# Patient Record
Sex: FEMALE | Race: WHITE | NOT HISPANIC OR LATINO
[De-identification: names, ages, dates, MRNs, and addresses within clinical notes are randomized per-mention and may not be internally consistent; named-entity substitution may affect disease eponyms.]

---

## 2017-02-13 ENCOUNTER — TRANSCRIPTION ENCOUNTER (OUTPATIENT)
Age: 72
End: 2017-02-13

## 2017-02-13 ENCOUNTER — INPATIENT (INPATIENT)
Facility: HOSPITAL | Age: 72
LOS: 8 days | Discharge: HOME | End: 2017-02-22
Attending: PSYCHIATRY & NEUROLOGY | Admitting: PSYCHIATRY & NEUROLOGY
Payer: MEDICARE

## 2017-02-13 VITALS
HEART RATE: 107 BPM | RESPIRATION RATE: 16 BRPM | SYSTOLIC BLOOD PRESSURE: 114 MMHG | WEIGHT: 128.97 LBS | TEMPERATURE: 98 F | HEIGHT: 61 IN | DIASTOLIC BLOOD PRESSURE: 78 MMHG

## 2017-02-13 DIAGNOSIS — R69 ILLNESS, UNSPECIFIED: ICD-10-CM

## 2017-02-13 DIAGNOSIS — F33.2 MAJOR DEPRESSIVE DISORDER, RECURRENT SEVERE WITHOUT PSYCHOTIC FEATURES: ICD-10-CM

## 2017-02-13 LAB
ALBUMIN SERPL ELPH-MCNC: 3.9 G/DL — SIGNIFICANT CHANGE UP (ref 3.3–5)
ALP SERPL-CCNC: 105 U/L — SIGNIFICANT CHANGE UP (ref 40–120)
ALT FLD-CCNC: 28 U/L — SIGNIFICANT CHANGE UP (ref 12–78)
AMPHET UR-MCNC: NEGATIVE — SIGNIFICANT CHANGE UP
ANION GAP SERPL CALC-SCNC: 9 MMOL/L — SIGNIFICANT CHANGE UP (ref 5–17)
APAP SERPL-MCNC: <2 UG/ML — LOW (ref 10–30)
APPEARANCE UR: CLEAR — SIGNIFICANT CHANGE UP
AST SERPL-CCNC: 36 U/L — SIGNIFICANT CHANGE UP (ref 15–37)
BACTERIA # UR AUTO: (no result)
BARBITURATES UR SCN-MCNC: NEGATIVE — SIGNIFICANT CHANGE UP
BASOPHILS # BLD AUTO: 0.1 K/UL — SIGNIFICANT CHANGE UP (ref 0–0.2)
BASOPHILS NFR BLD AUTO: 0.4 % — SIGNIFICANT CHANGE UP (ref 0–2)
BENZODIAZ UR-MCNC: NEGATIVE — SIGNIFICANT CHANGE UP
BILIRUB SERPL-MCNC: 0.9 MG/DL — SIGNIFICANT CHANGE UP (ref 0.2–1.2)
BILIRUB UR-MCNC: (no result)
BUN SERPL-MCNC: 17 MG/DL — SIGNIFICANT CHANGE UP (ref 7–23)
CALCIUM SERPL-MCNC: 9.8 MG/DL — SIGNIFICANT CHANGE UP (ref 8.5–10.1)
CHLORIDE SERPL-SCNC: 105 MMOL/L — SIGNIFICANT CHANGE UP (ref 96–108)
CO2 SERPL-SCNC: 27 MMOL/L — SIGNIFICANT CHANGE UP (ref 22–31)
COCAINE METAB.OTHER UR-MCNC: NEGATIVE — SIGNIFICANT CHANGE UP
COD CRY URNS QL: (no result)
COLOR SPEC: YELLOW — SIGNIFICANT CHANGE UP
CREAT SERPL-MCNC: 1.14 MG/DL — SIGNIFICANT CHANGE UP (ref 0.5–1.3)
DIFF PNL FLD: (no result)
EOSINOPHIL # BLD AUTO: 0 K/UL — SIGNIFICANT CHANGE UP (ref 0–0.5)
EOSINOPHIL NFR BLD AUTO: 0.2 % — SIGNIFICANT CHANGE UP (ref 0–6)
EPI CELLS # UR: SIGNIFICANT CHANGE UP
ETHANOL SERPL-MCNC: <10 MG/DL — SIGNIFICANT CHANGE UP (ref 0–10)
GLUCOSE SERPL-MCNC: 105 MG/DL — HIGH (ref 70–99)
GLUCOSE UR QL: NEGATIVE MG/DL — SIGNIFICANT CHANGE UP
HCT VFR BLD CALC: 35.7 % — SIGNIFICANT CHANGE UP (ref 34.5–45)
HGB BLD-MCNC: 12.7 G/DL — SIGNIFICANT CHANGE UP (ref 11.5–15.5)
KETONES UR-MCNC: (no result)
LEUKOCYTE ESTERASE UR-ACNC: (no result)
LYMPHOCYTES # BLD AUTO: 19.5 % — SIGNIFICANT CHANGE UP (ref 13–44)
LYMPHOCYTES # BLD AUTO: 2.4 K/UL — SIGNIFICANT CHANGE UP (ref 1–3.3)
MCHC RBC-ENTMCNC: 33.4 PG — SIGNIFICANT CHANGE UP (ref 27–34)
MCHC RBC-ENTMCNC: 35.5 GM/DL — SIGNIFICANT CHANGE UP (ref 32–36)
MCV RBC AUTO: 93.9 FL — SIGNIFICANT CHANGE UP (ref 80–100)
METHADONE UR-MCNC: NEGATIVE — SIGNIFICANT CHANGE UP
MONOCYTES # BLD AUTO: 0.7 K/UL — SIGNIFICANT CHANGE UP (ref 0–0.9)
MONOCYTES NFR BLD AUTO: 5.2 % — SIGNIFICANT CHANGE UP (ref 2–14)
NEUTROPHILS # BLD AUTO: 9.3 K/UL — HIGH (ref 1.8–7.4)
NEUTROPHILS NFR BLD AUTO: 74.6 % — SIGNIFICANT CHANGE UP (ref 43–77)
NITRITE UR-MCNC: NEGATIVE — SIGNIFICANT CHANGE UP
OPIATES UR-MCNC: NEGATIVE — SIGNIFICANT CHANGE UP
PCP SPEC-MCNC: SIGNIFICANT CHANGE UP
PCP UR-MCNC: NEGATIVE — SIGNIFICANT CHANGE UP
PH UR: 5 — SIGNIFICANT CHANGE UP (ref 4.8–8)
PLATELET # BLD AUTO: 433 K/UL — HIGH (ref 150–400)
POTASSIUM SERPL-MCNC: 4.2 MMOL/L — SIGNIFICANT CHANGE UP (ref 3.5–5.3)
POTASSIUM SERPL-SCNC: 4.2 MMOL/L — SIGNIFICANT CHANGE UP (ref 3.5–5.3)
PROT SERPL-MCNC: 7.5 GM/DL — SIGNIFICANT CHANGE UP (ref 6–8.3)
PROT UR-MCNC: 30 MG/DL
RBC # BLD: 3.8 M/UL — SIGNIFICANT CHANGE UP (ref 3.8–5.2)
RBC # FLD: 11.5 % — SIGNIFICANT CHANGE UP (ref 10.3–14.5)
RBC CASTS # UR COMP ASSIST: (no result) /HPF (ref 0–4)
SALICYLATES SERPL-MCNC: <1.7 MG/DL — LOW (ref 2.8–20)
SODIUM SERPL-SCNC: 141 MMOL/L — SIGNIFICANT CHANGE UP (ref 135–145)
SP GR SPEC: 1.03 — HIGH (ref 1.01–1.02)
THC UR QL: NEGATIVE — SIGNIFICANT CHANGE UP
UROBILINOGEN FLD QL: 1 MG/DL
WBC # BLD: 12.4 K/UL — HIGH (ref 3.8–10.5)
WBC # FLD AUTO: 12.4 K/UL — HIGH (ref 3.8–10.5)
WBC UR QL: (no result)

## 2017-02-13 PROCEDURE — 93010 ELECTROCARDIOGRAM REPORT: CPT

## 2017-02-13 PROCEDURE — 99285 EMERGENCY DEPT VISIT HI MDM: CPT

## 2017-02-13 RX ORDER — TETANUS AND DIPHTHERIA TOXOIDS ADSORBED 2; 2 [LF]/.5ML; [LF]/.5ML
0.5 INJECTION INTRAMUSCULAR ONCE
Qty: 0 | Refills: 0 | Status: COMPLETED | OUTPATIENT
Start: 2017-02-13 | End: 2017-02-13

## 2017-02-13 RX ORDER — VILAZODONE HYDROCHLORIDE 20 MG/1
40 TABLET, FILM COATED ORAL DAILY
Qty: 0 | Refills: 0 | Status: DISCONTINUED | OUTPATIENT
Start: 2017-02-13 | End: 2017-02-14

## 2017-02-13 RX ADMIN — TETANUS AND DIPHTHERIA TOXOIDS ADSORBED 0.5 MILLILITER(S): 2; 2 INJECTION INTRAMUSCULAR at 20:54

## 2017-02-13 NOTE — ED BEHAVIORAL HEALTH ASSESSMENT NOTE - SUMMARY
72 y.o. DWF with intractable depression who made an impulsive , serious suicide attempt 3d ago , first cutting L wrist and then deeper on R wrist"but it wasn't deep enough". Sought no care until son called yesterday "and I told him what I did".   No longer sees therapist Lizbeth Santos"because she doesn't do CBT."    Had unsuccessful TMS treatments prior to Viibyd, neither which have had efficacy with depression. Pt. reports TMS "helped anxiety".   Pt. presents danger to self at this time    recommendation:   9.39 admission to [facility]

## 2017-02-13 NOTE — ED BEHAVIORAL HEALTH ASSESSMENT NOTE - DETAILS
1 , no grandchildren pt took OD and attempted to CO poison self in past son states "all in her family" son is an alcoholic in recovery he reports consulted Dr. rasmussen Dr. Patel/Marcial

## 2017-02-13 NOTE — ED ADULT NURSE REASSESSMENT NOTE - NS ED NURSE REASSESS COMMENT FT1
Pt calm, cooperative, no complaints of pain or discomfort. 1:1 maintained for safety. Pt speaking to son on the phone. Pt pending results for admission to the hospital. Will continue to monitor for safety and comfort.

## 2017-02-13 NOTE — ED BEHAVIORAL HEALTH ASSESSMENT NOTE - DESCRIPTION
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

## 2017-02-13 NOTE — ED BEHAVIORAL HEALTH ASSESSMENT NOTE - PRIMARY DX
Severe episode of recurrent major depressive disorder, without psychotic features Deferred condition on axis II

## 2017-02-13 NOTE — ED BEHAVIORAL HEALTH ASSESSMENT NOTE - OTHER
Dr. Torres Pitch [phone] urgent care intractable depression, states questioned if sons and brother would be there for her during this depression

## 2017-02-13 NOTE — ED PROVIDER NOTE - NS ED MD SCRIBE ATTENDING SCRIBE SECTIONS
HISTORY OF PRESENT ILLNESS/PAST MEDICAL/SURGICAL/SOCIAL HISTORY/REVIEW OF SYSTEMS/PHYSICAL EXAM/PROGRESS NOTE/RESULTS/DISPOSITION/VITAL SIGNS( Pullset)

## 2017-02-13 NOTE — ED BEHAVIORAL HEALTH ASSESSMENT NOTE - RISK ASSESSMENT
completed: due to reported spontaneity of attempt, desire to have dog taken care of and no identified precipitant , pt presents risk to self with depressed mood

## 2017-02-13 NOTE — ED BEHAVIORAL HEALTH ASSESSMENT NOTE - HPI (INCLUDE ILLNESS QUALITY, SEVERITY, DURATION, TIMING, CONTEXT, MODIFYING FACTORS, ASSOCIATED SIGNS AND SYMPTOMS)
72 y.o. DWF with hx of chronic depression who made a suicide attempt on 2/10/16 by cutting wrists. Today went to urgent care and told them her story and 911 was called. Pt. has 2 adult sons who call her weekly or [id number]. yesterday one son called and she told them what she had done. he came over, cleaned the wounds and told her to get follow up. Pt. went to urgent care today.   2nd son met her in ED and informed writer mother had attempted suicide ~ 1 year ago by CO poisoning and was admitted to [facility]. he fears for her to live alone and wants her to stay with one son upon D/C.   Pt. denies specific precipitant to attempt. States that she didn't "cut deep enough, so I cleaned it up".   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 States has no friends or social meetings. Pt. has a dog . States planned to leave message for son to  the dog "before I passed out".   Reports has "lived with depression my whole life". States had course of ECT in 20's and have been tried "on every medication and combination".   Pt. states "I'm fine now, I want to go home".   No evidence of psychosis and denies A/V/O/T hallucinations. Denies paranoia, prefers to be alone.

## 2017-02-13 NOTE — ED BEHAVIORAL HEALTH ASSESSMENT NOTE - AXIS IV
Problems with primary support/Problems with access to healthcare services/Problem related to social environment

## 2017-02-13 NOTE — ED PROVIDER NOTE - OBJECTIVE STATEMENT
73 y/o female with PMHx of depression presents to the ED s/p suicide attempt on Saturday. Son states pt was more depressed than normal and cut her right wrist with a razor on Saturday while she was home alone. Pt went to Urgent Care seeking sutures and police were called because they suspected a suicide  Denies SI/HI.

## 2017-02-14 DIAGNOSIS — F60.3 BORDERLINE PERSONALITY DISORDER: ICD-10-CM

## 2017-02-14 DIAGNOSIS — F41.1 GENERALIZED ANXIETY DISORDER: ICD-10-CM

## 2017-02-14 RX ORDER — LURASIDONE HYDROCHLORIDE 40 MG/1
20 TABLET ORAL AT BEDTIME
Qty: 0 | Refills: 0 | Status: DISCONTINUED | OUTPATIENT
Start: 2017-02-14 | End: 2017-02-21

## 2017-02-14 RX ORDER — VILAZODONE HYDROCHLORIDE 20 MG/1
20 TABLET, FILM COATED ORAL DAILY
Qty: 0 | Refills: 0 | Status: DISCONTINUED | OUTPATIENT
Start: 2017-02-14 | End: 2017-02-21

## 2017-02-14 RX ADMIN — LURASIDONE HYDROCHLORIDE 20 MILLIGRAM(S): 40 TABLET ORAL at 21:18

## 2017-02-14 NOTE — BEHAVIORAL HEALTH ASSESSMENT NOTE - DETAILS
pt took OD and attempted to CO poison self in past son states "all in her family" pt's mother with h/o severe depression son is an alcoholic in recovery he reports

## 2017-02-14 NOTE — BEHAVIORAL HEALTH ASSESSMENT NOTE - AXIS III
allergies to codeine, prednisone , PCN and geodon  Significant laceratuion R wrist allergies to codeine, prednisone , PCN and geodon  Significant  self inflicted laceration to bilateral wrists  R >  L

## 2017-02-14 NOTE — BEHAVIORAL HEALTH ASSESSMENT NOTE - NSBHSUICRISKFACTOR_PSY_A_CORE
Anhedonia/Agitation/severe anxiety/Impulsivity/Perceived burden on family and others/Hopelessness/Mood episode

## 2017-02-14 NOTE — BEHAVIORAL HEALTH ASSESSMENT NOTE - NSBHREFERDETAILS_PSY_A_CORE_FT
Pt seen in Urgent Care for evaluation of pt SIB bilateral wrist cutting with a razor, a suicide attempt pt had made on 2/10/17  but one she had not planned to reveal to anyone until her son happened to phone the pt on 2/13/17.

## 2017-02-14 NOTE — BEHAVIORAL HEALTH ASSESSMENT NOTE - SUMMARY
72 y.o. DWF with intractable depression who made an impulsive , serious suicide attempt 3d ago , first cutting L wrist and then deeper on R wrist"but it wasn't deep enough". Sought no care until son called yesterday "and I told him what I did".   No longer sees therapist Lizbeth Santos"because she doesn't do CBT."    Had unsuccessful TMS treatments prior to Viibyd, neither which have had efficacy with depression. Pt. reports TMS "helped anxiety".   Pt. presents danger to self at this time    recommendation:   9.39 admission to [facility]

## 2017-02-14 NOTE — CONSULT NOTE ADULT - ASSESSMENT
SEVERE DEPRESSION WITH SUICIDE ATTEMPT AND SLASHING WRISTS    ABNORMAL URINALYSIS BUT PATIENT IS ASYMPTOMATIC      RECOMMENDATIONS:  -  medically stable for admission to   -  primary management per psychiatry  -  will need to have the sutures removed in 7-10 days, can follow up at the Urgent Care where she had them placed  -  will sign off SEVERE DEPRESSION WITH SUICIDE ATTEMPT AND BILATERAL WRIST LACERATIONS    ABNORMAL URINALYSIS, BUT PATIENT IS ASYMPTOMATIC AT THIS TIME      RECOMMENDATIONS:  -  medically stable for admission to   -  primary management per psychiatry  -  will consult plastic surgery - Dr. Callahan to assess if there is a role to suture the lacerations  -  pain control  -  will sign off    d/w patient, Dr. Callahan, psych RN

## 2017-02-14 NOTE — BEHAVIORAL HEALTH ASSESSMENT NOTE - PROBLEM SELECTOR PLAN 3
1 Medication regimen as above  2.DBT  CBT to address pt emotional dysregulation and low frustration tolerance.

## 2017-02-14 NOTE — BEHAVIORAL HEALTH ASSESSMENT NOTE - HPI (INCLUDE ILLNESS QUALITY, SEVERITY, DURATION, TIMING, CONTEXT, MODIFYING FACTORS, ASSOCIATED SIGNS AND SYMPTOMS)
72 y.o. DWF with hx of chronic depression and comorbid CHASE and social anxiety disorder, living alone at her home in [place]. The pt,  who had made a suicide attempt on 2/10/16 by cutting wrists went to a local Urgent Care for evaluation of her SIB wounds after which  urgent care staff, upon hearing the circumstances of the pt's injuries, had called.  911 and the pt was transported to  ED for acute psychiatric evaluation and emergency admission for further treatment of her severe depression and anxiety. Pt. has 2 adult sons who call her q 1-2 weekly  When her older son Kris , age 45  and living in [place], called  the pt on 2/13/17 and she told him  what she had done. he came over, cleaned the wounds and told her to get follow up. Pt. went to urgent care on 2/13/17 after which the pt was transported to  ED for evaluation as above.                     At the  ED, the pt's 41 yr-old son Sajan reported that the pt had made one earlier suicide attempt attempted approximately a year ago via  CO poisoning and  the pt had been admitted to [facility]. at that time for treatment of her severe, refractory depression.                     When seen in the ED on 2/13/17 and again on 5N on 2/14/17 by this writer, the pt attempted to downplay the nature and severity of her depression and her recent suicide attempt.  The pt had denied a  specific precipitant to this  attempt. and stated that after having cut her wrists, ," I saw that I didn't  cut deep enough, so I cleaned it up. It was a mess. ".                    Interestingly, the pt noted that, " I started to feel better right away when I saw how quickly my sons and my brother in [place] came to support me. I keep thinking they won't want to support me anymore with my depression."                    The pt endorsed a neurovegetative pattern of depressive symptoms including decreased energy, anhedonia and variably  poor sleep and appetite with chronic suicidality and multiple largely unsuccessful treatments including most recently, a course of TMS x the past 1-2 months with Dr Brian Prado in [place].. Pt reported a brief period of clinical improvement after TMS, with subsequent relapse  ' like before. 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  States has no friends or social meetings. Pt. has a dog Martita  In the ED, pt had reported that after cutting her wrists, she had " planned to leave message for  my son to  the dog  before I passed out".              Reports has "lived with depression my whole life". States had course of ECT in 20's and have been tried "on every medication and combination". with minimal , always fleeting clinical efficacy.  The pt denied a h/o substance use, psychosis, HI or legal issues. The pt was admitted to   on a 9.39 emergency legal status on 2/13/17 and placed on suicide alert status for her safety. The pt was seen by the hospitalist on 2/14/17 and a Plastic Surgery consult was requested as to pt SIB to bilateral wrists on 2/10/17.        We briefly reviewed pt's current outpt med treatment regimen of Viibryd 40 mg po q am x the past 6 weeks. Pt again noted minimal clinical efficacy with this. Pt suggested a possible retrial of Latuda which the pt believed  'may have worked well for me in the past, but of course , that didn't last long either.'

## 2017-02-14 NOTE — BEHAVIORAL HEALTH ASSESSMENT NOTE - NSBHADMITIPSTRENGTH_PSY_A_CORE
Has vocational interests or hobbies/Has access to housing/residential stability/Knowledge of medications/Cooperative with treatment/Intelligent/Creative/Has supportive interpersonal relationships with family, friends or peers/In good physical health

## 2017-02-14 NOTE — CONSULT NOTE ADULT - SUBJECTIVE AND OBJECTIVE BOX
PCP -     CHIEF COMPLAINT:   I' cute my wrists"    HISTORY OF THE PRESENT ILLNESS:  72 F with     PAST MEDICAL HISTORY:  Depression    PAST SURGICAL HISTORY:    FAMILY HISTORY:   non-contributory to the patient's current presentation    SOCIAL HISTORY:  no smoking, no alcohol, no drugs    REVIEW OF SYSTEMS:   All 10 systems reviewed in detailed and found to be negative with the exception of what has already been described above    MEDICATIONS  (STANDING):  vilaZODone 40milliGRAM(s) Oral daily    MEDICATIONS  (PRN):  LORazepam     Tablet 0.5milliGRAM(s) Oral every 6 hours PRN severe anxiety/agiation      VITALS SIGNS:  T(F): 97.9, Max: 98.1 (02-13 @ 12:49)  HR: 94 (65 - 107)  BP: 121/68 (114/78 - 133/73)  RR: 16 (16 - 16)  SpO2: 100% (95% - 100%)  Wt(kg): --      PHYSICAL EXAM:    HEENT:  pupils equal and reactive, EOMI, no oropharyngeal lesions, erythema, exudates, oral thrush    NECK:   supple, no carotid bruits, no palpable lymph nodes, no thyromegaly    CV:  +S1, +S2, regular, no murmurs or rubs    RESP:   lungs clear to auscultation bilaterally, no wheezing, rales, rhonchi, good air entry bilaterally    BREAST:  not examined    GI:  abdomen soft, non-tender, non-distended, normal BS, no bruits, no abdominal masses, no palpable masses    RECTAL:  not examined    :  not examined    MSK:   normal muscle tone, no atrophy, no rigidity, no contractions    EXT:   no clubbing, no cyanosis, no edema, no calf pain, swelling or erythema    VASCULAR:  pulses equal and symmetric in the upper and lower extremities    NEURO:  AAOX3, no focal neurological deficits, follows all commands, able to move extremities spontaneously    SKIN:  no ulcers, lesions or rashes        LABS:                        12.7   12.4  )-----------( 433      ( 2017 16:13 )             35.7     2017 16:13    141    |  105    |  17     ----------------------------<  105    4.2     |  27     |  1.14     Ca    9.8        2017 16:13    TPro  7.5    /  Alb  3.9    /  TBili  0.9    /  DBili  x      /  AST  36     /  ALT  28     /  AlkPhos  105    2017 16:13    LIVER FUNCTIONS - ( 2017 16:13 )  Alb: 3.9 g/dL / Pro: 7.5 gm/dL / ALK PHOS: 105 U/L / ALT: 28 U/L / AST: 36 U/L / GGT: x           Urinalysis Basic - ( 2017 16:59 )    Color: Yellow / Appearance: Clear / S.030 / pH: x  Gluc: x / Ketone: Trace  / Bili: Small / Urobili: 1 mg/dL   Blood: x / Protein: 30 mg/dL / Nitrite: Negative   Leuk Esterase: Moderate / RBC: 3-5 /HPF / WBC 6-10   Sq Epi: x / Non Sq Epi: Occasional / Bacteria: Few    Blood, Urine: Small ( @ 16:59)      EKG - NSR, no acute ischemic changes, QTc 431 PCP - Lula Mckeon    CHIEF COMPLAINT:   "I cut my wrists"    HISTORY OF THE PRESENT ILLNESS:  72 F with Hx of Depression and Suicide in the past, presented to the Urgent care center after cutting her wrists on Friday in an attempt to commit suicide.  The police were called and she was taken to HH ER for evaluation.  It was felt by the ED physicians that the lacerations were not possible to be closed.  She was seen by the psych team and admitted to the inpatient unit.  Patient is asking for the lacerations to be sutured for if they can be evaluated by plastic surgery.    PAST MEDICAL HISTORY:  Depression  Suicide Attempt in the past    PAST SURGICAL HISTORY:  s/p lumpectomy  s/p hysterectomy    FAMILY HISTORY:   non-contributory to the patient's current presentation    SOCIAL HISTORY:  no smoking, no alcohol, no drugs, 2 sons, lives alone, can do own ADLs,     REVIEW OF SYSTEMS:   All 10 systems reviewed in detailed and found to be negative with the exception of what has already been described above    MEDICATIONS  (STANDING):  vilaZODone 40milliGRAM(s) Oral daily    MEDICATIONS  (PRN):  LORazepam     Tablet 0.5milliGRAM(s) Oral every 6 hours PRN severe anxiety/agiation      VITALS SIGNS:  T(F): 97.9, Max: 98.1 (02-13 @ 12:49)  HR: 94 (65 - 107)  BP: 121/68 (114/78 - 133/73)  RR: 16 (16 - 16)  SpO2: 100% (95% - 100%)  Wt(kg): --      PHYSICAL EXAM:    HEENT:  pupils equal and reactive, EOMI, no oropharyngeal lesions, erythema, exudates, oral thrush    NECK:   supple, no carotid bruits, no palpable lymph nodes, no thyromegaly    CV:  +S1, +S2, regular, no murmurs or rubs    RESP:   lungs clear to auscultation bilaterally, no wheezing, rales, rhonchi, good air entry bilaterally    BREAST:  not examined    GI:  abdomen soft, non-tender, non-distended, normal BS, no bruits, no abdominal masses, no palpable masses    RECTAL:  not examined    :  not examined    MSK:   normal muscle tone, no atrophy, no rigidity, no contractions    EXT:   no clubbing, no cyanosis, no edema, no calf pain, swelling or erythema, there are 2 large transverse lacerations on each wrist about 6cm in length.  There is no exposed bone or tendons and the wounds appearing to be healing with secondary intent.  There is mild tenderness to palpation, no drainage, no bleeding, no surrounding erythema, FROM of the wrists, fingers.  Sensation intact,  no neurological hand deficits, + pulses, hands warm and perfused    VASCULAR:  pulses equal and symmetric in the upper and lower extremities    NEURO:  AAOX3, no focal neurological deficits, follows all commands, able to move extremities spontaneously    SKIN:  as above in EXT section        LABS:                        12.7   12.4  )-----------( 433      ( 2017 16:13 )             35.7     2017 16:13    141    |  105    |  17     ----------------------------<  105    4.2     |  27     |  1.14     Ca    9.8        2017 16:13    TPro  7.5    /  Alb  3.9    /  TBili  0.9    /  DBili  x      /  AST  36     /  ALT  28     /  AlkPhos  105    2017 16:13    LIVER FUNCTIONS - ( 2017 16:13 )  Alb: 3.9 g/dL / Pro: 7.5 gm/dL / ALK PHOS: 105 U/L / ALT: 28 U/L / AST: 36 U/L / GGT: x           Urinalysis Basic - ( 2017 16:59 )    Color: Yellow / Appearance: Clear / S.030 / pH: x  Gluc: x / Ketone: Trace  / Bili: Small / Urobili: 1 mg/dL   Blood: x / Protein: 30 mg/dL / Nitrite: Negative   Leuk Esterase: Moderate / RBC: 3-5 /HPF / WBC 6-10   Sq Epi: x / Non Sq Epi: Occasional / Bacteria: Few    Blood, Urine: Small ( @ 16:59)      EKG - NSR, no acute ischemic changes, QTc 431

## 2017-02-14 NOTE — BEHAVIORAL HEALTH ASSESSMENT NOTE - PAST PSYCHOTROPIC MEDICATION
trials of multiple antidepressants, antipsychotics, mood stabilizers, including Latuda with some reported brief clinical efficacy

## 2017-02-14 NOTE — BEHAVIORAL HEALTH ASSESSMENT NOTE - NSBHCHARTREVIEWLAB_PSY_A_CORE FT
CBC Full  -  ( 2017 16:13 )  WBC Count : 12.4 K/uL  Hemoglobin : 12.7 g/dL  Hematocrit : 35.7 %  Platelet Count - Automated : 433 K/uL  Mean Cell Volume : 93.9 fl  Mean Cell Hemoglobin : 33.4 pg  Mean Cell Hemoglobin Concentration : 35.5 gm/dL  Auto Neutrophil # : 9.3 K/uL  Auto Lymphocyte # : 2.4 K/uL  Auto Monocyte # : 0.7 K/uL  Auto Eosinophil # : 0.0 K/uL  Auto Basophil # : 0.1 K/uL  Auto Neutrophil % : 74.6 %  Auto Lymphocyte % : 19.5 %  Auto Monocyte % : 5.2 %  Auto Eosinophil % : 0.2 %  Auto Basophil % : 0.4 %    2017 16:13    141    |  105    |  17     ----------------------------<  105    4.2     |  27     |  1.14     Ca    9.8        2017 16:13    TPro  7.5    /  Alb  3.9    /  TBili  0.9    /  DBili  x      /  AST  36     /  ALT  28     /  AlkPhos  105    2017 16:13      Urinalysis Basic - ( 2017 16:59 )    Color: Yellow / Appearance: Clear / S.030 / pH: x  Gluc: x / Ketone: Trace  / Bili: Small / Urobili: 1 mg/dL   Blood: x / Protein: 30 mg/dL / Nitrite: Negative   Leuk Esterase: Moderate / RBC: 3-5 /HPF / WBC 6-10   Sq Epi: x / Non Sq Epi: Occasional / Bacteria: Few

## 2017-02-14 NOTE — BEHAVIORAL HEALTH ASSESSMENT NOTE - PROBLEM SELECTOR PLAN 1
1.To begin rapid taper and DC of current largely ineffective antidepressant Vilazodone from 40 mg  to 20 mg po q am   2.To restart reportedly at least somewhat effective Latuda 20 mg po qhs with titration as tolerated and as clinically indicated for treatment of severe depression and affective dysregulation  3.Pt encouraged to attend therapy groups   4.Pt gave verbal consent to have staff contact pt's family and outpt providers  5.CBT  DBT treatments recommended. To begin pt work on  healthier coping skills

## 2017-02-14 NOTE — BEHAVIORAL HEALTH ASSESSMENT NOTE - NSBHCHARTREVIEWVS_PSY_A_CORE FT
Vital Signs Last 24 Hrs  T(C): 36.6, Max: 36.6 (02-13 @ 20:30)  T(F): 97.9, Max: 97.9 (02-14 @ 08:27)  HR: 94 (94 - 94)  BP: 121/68 (121/68 - 121/68)  BP(mean): --  RR: 16 (16 - 16)  SpO2: 100% (95% - 100%)

## 2017-02-15 RX ORDER — LANOLIN ALCOHOL/MO/W.PET/CERES
3 CREAM (GRAM) TOPICAL AT BEDTIME
Qty: 0 | Refills: 0 | Status: DISCONTINUED | OUTPATIENT
Start: 2017-02-15 | End: 2017-02-22

## 2017-02-15 RX ORDER — IBUPROFEN 200 MG
600 TABLET ORAL
Qty: 0 | Refills: 0 | Status: DISCONTINUED | OUTPATIENT
Start: 2017-02-15 | End: 2017-02-22

## 2017-02-15 RX ADMIN — Medication 600 MILLIGRAM(S): at 19:36

## 2017-02-15 RX ADMIN — Medication 0.5 MILLIGRAM(S): at 09:03

## 2017-02-15 RX ADMIN — VILAZODONE HYDROCHLORIDE 20 MILLIGRAM(S): 20 TABLET, FILM COATED ORAL at 12:06

## 2017-02-15 RX ADMIN — LURASIDONE HYDROCHLORIDE 20 MILLIGRAM(S): 40 TABLET ORAL at 21:35

## 2017-02-15 RX ADMIN — Medication 600 MILLIGRAM(S): at 21:35

## 2017-02-15 RX ADMIN — Medication 1 TABLET(S): at 21:35

## 2017-02-15 NOTE — PROGRESS NOTE BEHAVIORAL HEALTH - AXIS III
allergies to codeine, prednisone , PCN and geodon  Significant  self inflicted laceration to bilateral wrists  R >  L

## 2017-02-15 NOTE — CONSULT NOTE ADULT - SUBJECTIVE AND OBJECTIVE BOX
72F s/p suicide attempt last Friday who presented to ED with bilateral wrist lacerations.  The lacerations were not addressed in the ED at that time and so I was called to evaluate.  Pt endorses bilateral pain on her volar wrists.  Denies impairment in sensibility or ROM.     PMH: depression, previous suicide attempt  PSH: lumpectomy, hysterectomy    Allx: PCN    Soc: Nonsmoker    EXAM:   R wrist: volar laceration prox to distal wrist crease, approx 6cm in length x 0.5cm wide, full thickness through dermis to subcutaneous tissue without exposure of vital structures.  No distal paresthesias.  Full ROM in all digits, wrist ROM slightly limited 2/2 pain.   L wrist: 6cm laceration full thickness just prox to distal wrist crease. slightly more shallow than R wrist. No vital structures appreciated.       Procedure (dictated):  bilateral complex closure of wrist lacerations.   Sutured with buried monocryl, steri strips and guaze applied.    Imp:  72F s/p suicide attempt with bilateral wrist lacerations.  Performed delayed primary closure at the bedside.   --would rec 1 week of abx   --ok to shower tomorrow  --f/u as outpatient for wound check (approx 1 week. if she is still an inpatient will see her again on the floor).  --please call with any questions/concerns.     [phone]

## 2017-02-16 LAB
AMPHETAMINES BLD QL SCN: NEGATIVE — SIGNIFICANT CHANGE UP
BARBITURATES SERPLBLD QL: NEGATIVE — SIGNIFICANT CHANGE UP
BENZODIAZAPINES, SERUM: NEGATIVE — SIGNIFICANT CHANGE UP
CANNABINOIDS SERPLBLD QL SCN: NEGATIVE — SIGNIFICANT CHANGE UP
COCAINE+BZE SERPLBLD QL SCN: NEGATIVE — SIGNIFICANT CHANGE UP
METHADONE SERPL-MCNC: NEGATIVE — SIGNIFICANT CHANGE UP
OPIATES SERPL QL: NEGATIVE — SIGNIFICANT CHANGE UP
PCP BLD QL SCN: NEGATIVE — SIGNIFICANT CHANGE UP

## 2017-02-16 RX ADMIN — LURASIDONE HYDROCHLORIDE 20 MILLIGRAM(S): 40 TABLET ORAL at 21:20

## 2017-02-16 RX ADMIN — VILAZODONE HYDROCHLORIDE 20 MILLIGRAM(S): 20 TABLET, FILM COATED ORAL at 10:49

## 2017-02-16 RX ADMIN — Medication 1 TABLET(S): at 21:20

## 2017-02-16 RX ADMIN — Medication 1 TABLET(S): at 09:13

## 2017-02-17 RX ADMIN — Medication 1 TABLET(S): at 09:21

## 2017-02-17 RX ADMIN — Medication 1 TABLET(S): at 22:01

## 2017-02-17 RX ADMIN — LURASIDONE HYDROCHLORIDE 20 MILLIGRAM(S): 40 TABLET ORAL at 22:01

## 2017-02-18 RX ADMIN — Medication 1 TABLET(S): at 09:23

## 2017-02-18 RX ADMIN — LURASIDONE HYDROCHLORIDE 20 MILLIGRAM(S): 40 TABLET ORAL at 21:31

## 2017-02-18 RX ADMIN — Medication 1 TABLET(S): at 21:31

## 2017-02-18 RX ADMIN — VILAZODONE HYDROCHLORIDE 20 MILLIGRAM(S): 20 TABLET, FILM COATED ORAL at 09:25

## 2017-02-19 RX ADMIN — LURASIDONE HYDROCHLORIDE 20 MILLIGRAM(S): 40 TABLET ORAL at 20:49

## 2017-02-19 RX ADMIN — VILAZODONE HYDROCHLORIDE 20 MILLIGRAM(S): 20 TABLET, FILM COATED ORAL at 11:28

## 2017-02-19 RX ADMIN — Medication 1 TABLET(S): at 20:49

## 2017-02-19 RX ADMIN — Medication 1 TABLET(S): at 11:29

## 2017-02-20 RX ADMIN — Medication 1 TABLET(S): at 21:08

## 2017-02-20 RX ADMIN — LURASIDONE HYDROCHLORIDE 20 MILLIGRAM(S): 40 TABLET ORAL at 21:08

## 2017-02-20 RX ADMIN — VILAZODONE HYDROCHLORIDE 20 MILLIGRAM(S): 20 TABLET, FILM COATED ORAL at 10:52

## 2017-02-20 RX ADMIN — Medication 1 TABLET(S): at 09:04

## 2017-02-20 NOTE — PROGRESS NOTE BEHAVIORAL HEALTH - NSBHFUPSUICINTERVALFT_PSY_A_CORE
pt currently denies SI but shows limited appreciation for the seriousness of her recent suicide attempt   Pt reported marked mood improvement after suicide gesture prior to admission " once I saw my family support me."
pt with current passive SI. Pt reported marked mood improvement after suicide gesture prior to admission " once I saw my family support me."
pt with current passive SI. Pt reported marked mood improvement after suicide gesture prior to admission " once I saw my family support me."

## 2017-02-21 RX ORDER — LURASIDONE HYDROCHLORIDE 40 MG/1
40 TABLET ORAL AT BEDTIME
Qty: 0 | Refills: 0 | Status: DISCONTINUED | OUTPATIENT
Start: 2017-02-21 | End: 2017-02-22

## 2017-02-21 RX ORDER — LANOLIN ALCOHOL/MO/W.PET/CERES
1 CREAM (GRAM) TOPICAL
Qty: 14 | Refills: 1 | OUTPATIENT
Start: 2017-02-21 | End: 2017-03-20

## 2017-02-21 RX ORDER — LURASIDONE HYDROCHLORIDE 40 MG/1
1 TABLET ORAL
Qty: 14 | Refills: 1 | OUTPATIENT
Start: 2017-02-21 | End: 2017-03-20

## 2017-02-21 RX ADMIN — Medication 1 TABLET(S): at 09:09

## 2017-02-21 RX ADMIN — LURASIDONE HYDROCHLORIDE 40 MILLIGRAM(S): 40 TABLET ORAL at 21:05

## 2017-02-21 RX ADMIN — Medication 1 TABLET(S): at 21:05

## 2017-02-21 NOTE — DISCHARGE NOTE BEHAVIORAL HEALTH - NSBHDCCRISISPLAN1FT_PSY_A_CORE
Call 911, go to your nearest emergency room, call the crisis hotline number provided, call you psychiatrist

## 2017-02-21 NOTE — PROGRESS NOTE BEHAVIORAL HEALTH - NSBHADMITIPDSM_PSY_A_CORE
see above for Axis I, II, III

## 2017-02-21 NOTE — PROGRESS NOTE BEHAVIORAL HEALTH - PROBLEM SELECTOR PLAN 1
1.To begin rapid taper and DC of current largely ineffective antidepressant Vilazodone from 40 mg  to 20 mg po q am   2.To restart reportedly at least somewhat effective Latuda 20 mg po qhs with titration as tolerated and as clinically indicated for treatment of severe depression and affective dysregulation  3.Pt encouraged to attend therapy groups   4.Pt gave verbal consent to have staff contact pt's family and outpt providers  5.CBT  DBT treatments recommended. To begin pt work on  healthier  coping skills  6.Melatonin 3 mg po qhs ( insomnia)
1.Continue taper and plan for  DC of current largely ineffective antidepressant Vilazodone from 40 mg  to 20 mg po q am   2.To restart reportedly at least somewhat effective Latuda 20 mg po qhs with titration as tolerated and as clinically indicated for treatment of severe depression and affective dysregulation  3.Pt encouraged to attend therapy groups   4.Pt gave verbal consent to have staff contact pt's family and outpt providers  5.CBT  DBT treatments recommended. To begin pt work on  healthier  coping skills  6.Melatonin 3 mg po qhs ( insomnia)  7. Disposition planning ongoing.
1. DC reportedly ( per pt)  ineffective antidepressant Vilazodone now tapered to  20 mg po q am   2.To increase recently restarted reportedly at least somewhat effective Latuda dose to  40 mg po qhs with titration as tolerated and as clinically indicated for treatment of severe depression and affective dysregulation  3.Pt encouraged to attend therapy groups   4.Pt gave verbal consent to have staff contact pt's family  and Dr Prado ( outpt  psychiatrist )  5.CBT  DBT treatments recommended. To begin pt work on  healthier  coping skills  6.Melatonin 3 mg po qhs ( insomnia)  7. Disposition planning ongoing.
1.Continue taper and plan for  DC of current largely ineffective antidepressant Vilazodone from 40 mg  to 20 mg po q am   2.To restart reportedly at least somewhat effective Latuda 20 mg po qhs with titration as tolerated and as clinically indicated for treatment of severe depression and affective dysregulation  3.Pt encouraged to attend therapy groups   4.Pt gave verbal consent to have staff contact pt's family and outpt providers  5.CBT  DBT treatments recommended. To begin pt work on  healthier  coping skills  6.Melatonin 3 mg po qhs ( insomnia)  7. Disposition planning ongoing.

## 2017-02-21 NOTE — DISCHARGE NOTE BEHAVIORAL HEALTH - NSBHDCSUICFCTROTHERFT_PSY_A_CORE
ongoing, longstanding severe personality disorder difficulties involving emotional dysregulation, misperceived fears of abandonment  with subsequent pt dangerous impulsivity  .

## 2017-02-21 NOTE — PROGRESS NOTE BEHAVIORAL HEALTH - NSBHADMITIPREASON_PSY_A_CORE
Danger to self; mental illness expected to respond to inpatient care

## 2017-02-21 NOTE — DISCHARGE NOTE BEHAVIORAL HEALTH - CARE PROVIDER_API CALL
antonia dominguez Dr -pt's outpatient psychiatrist  in [place]  Tel [phone]  Appt scheduled for 2/23/17  at 9:30 am  Phone: (   )    -  Fax: (   )    -

## 2017-02-21 NOTE — PROGRESS NOTE BEHAVIORAL HEALTH - NSBHFUPINTERVALHXFT_PSY_A_CORE
Pt seen. Pt is tolerating med changes including DC of reportedly ineffective Vilazodone and restart of previously 'effective' Latuda with plan to titrate dose to 40 mg po qhs  for treatment of long standing emotional dysregulation and affective instability ..   Lengthy discussion with the pt as to the need for her to begin her CBT ( cognitive behavioral therapy) work right now, here in hospital and that with or without medications, a key focus  for this pt needed to be on addressing the pt's ongoing, entrenched cognitive distortions  and misperceptions related to 'her 'abandonment issues' with the family who have steadfastly supported the pt over her lifetime of 'depression'. and affective dysregulation.  On 2/15/17, this  writer had spoken with  the  pt's son Kris . Writer had met with the pt's older son after he had visited again with the pt during visiting hr. We discussed pt h/o depression and affective dysregulation with intermittent dangerous impulsivity always in the context of the pt's misperceived fears of 'being abandoned'. Though the pt's son remained stalwart and tried to minimize his own distress, he noted that ' We have all been through this with her for most of her life.  " When writer suggested that family members could sometimes use supportive brief counseling as well, the pt spoke of his ongoing sobriety and of his use of AA " for all my needs. " Pt 's son  reported that he has an AA  sponsor  and that he also sponsors others ..    Pt and earlier pt's son spoke of plans to have the pt begin outpt treatment with a therapist who specializes in CBT  and DBT, the latter at this writer's suggestion in light of the pt's apparent and longstanding entrenched personality picture consistent with borderline and dependent  personality disorders . Pt also amenable to continuing low dose trial of Melatonin  3 mg po qhs for chronic insomnia.
Pt seen. Pt is tolerating med changes including taper and DC of ineffective Vilazodone and restart of previously 'effective' Latuda.   Lengthy discussion with the pt as to the need for her to begin her CBT ( cognitive behavioral therapy) work right now, here in hospital and that with or without medications, a key focus  for this pt needed to be on addressing the pt's ongoing, entrenched cognitive distortions  and misperceptions related to 'her 'abandonment issues' with the family who have steadfastly supported the pt over her lifetime of 'depression'. and affective dysregulation.  On 2/15/17, this  writer had spoken with  the  pt's son Kris . Writer had met with the pt's older son after he had visited again with the pt during visiting hr. We discussed pt h/o depression and affective dysregulation with intermittent dangerous impulsivity always in the context of the pt's misperceived fears of 'being abandoned'. Though the pt's son remained stalwart and tried to minimize his own distress, he noted that ' We have all been through this with her for most of her life.  " When writer suggested that family members could sometimes use supportive brief counseling as well, the pt spoke of his ongoing sobriety and of his use of AA " for all my needs. " Pt 's son  reported that he has an AA  sponsor  and that he also sponsors others ..    Pt and earlier pt's son spoke of plans to have the pt begin outpt treatment with a therapist who specializes in CBT  and DBT, the latter at this writer's suggestion in light of the pt's apparent and longstanding entrenched personality picture consistent with borderline and dependent  personality disorders.   Writer again  reviewed with the pt the plan to taper and DC reportedly ineffective Vilazodone while restarting reportedly initially effective Latuda  for treatment of the pt's affective dysregulation. Pt also amenable to a trial of Melatonin for chronic insomnia.
Pt seen. Pt in better spirits once she proved to herself that her always loving and supportive family continues to love and support the pt. Pt's brother who lives in [place] had called the pt 24 hrs ago  and her older son Kris had visited twice with the pt in the past 24 hrs.   Writer had pt permission to speak with pt's son Kris . Writer met with the pt's older son after he had visited again with the pt during visiting hr. We discussed pt h/o depression and affective dysregulation with intermittent dangerous impulsivity always in the context of the pt's misperceived fears of 'being abandoned'. Though the pt's son remained stalwart and tried to minimize his own distress, he noted that ' We have all been through this with her for most of her life.  "   Pt and earlier pt's son spoke of plans to have the pt begin outpt treatment with a therapist who specializes in CBT  and DBT, the latter at this writer's suggestion in light of the pt's apparent and longstanding entrenched personality picture consistent with borderline and dependent  personality disorders.   Writer had reviewed with the pt the plan to taper and DC reportedly ineffective Vilazodone while restarting reportedly initially effective Latuda  for treatment of the pt's affective dysregulation. Pt also amenable to a trial of Melatonin for chronic insomnia.
Maintaining previous gains  Participating in unit activities  Tolerating crossover to Latuda

## 2017-02-21 NOTE — DISCHARGE NOTE BEHAVIORAL HEALTH - NSBHDCSUICFCTRMIT_PSY_A_CORE
pt is intelligent, creative and able to abstract . 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 Pt attends a local gym and cares  greatly about her dog .

## 2017-02-21 NOTE — PROGRESS NOTE BEHAVIORAL HEALTH - NSBHFUPINTERVALCCFT_PSY_A_CORE
" I 'm feeling better now. I want to go home."  Pt 'sealed over' with limited insight into the reasons behind her recent self destructive actions.  Writer reviewed with the pt the pt's long h/o self described misperceived  feelings of 'abandonment' by her family despite their having consistently rallied around the pt for over 40 yrs.     Writer spoke with pt's older son Chris again to review above as well as to aid in disposition planning.   Pt is tolerating taper  / crossover from Vilazodone to Latuda , the latter reportedly having been most effective in treating pt's emotional dysregulation. Pt herself concedes that ' no medication really has ever helped me for more than a few months. Then they just seem to stop working."   Writer encouraged the pt to continue her CBT / mindfulness 'homework' assignments as per activity therapists, in an effort for the pt to lay the foundation of her CBT therapy while inpatient, with ongoing pt CBT outpatient  treatment ( individual and group possibly) in order to solidify the pt's treatment gains. Pt amenable to this plan.   Pt seen by Plastic Surgery for follow up 2/17/17 s/p pt bilateral SIB cuts to both anterior wrists. Dr Roche  ( [phone]) to see the pt for outpatient follow up after pt DC from hospital when pt is clinically stable.
" I 'm feeling better. I feel like I'm in a better place now in my mind. I don't want to ever do anything to hurt myself again  or to hurt my sons again. I know they love me."  Pt 'sealed over' with limited insight into the reasons behind her recent self destructive actions.  Writer reviewed with the pt the pt's long h/o self described misperceived  feelings of 'abandonment' by her family despite their having consistently rallied around the pt for over 40 yrs.     Writer spoke with pt's older son Chris again to review above as well as to aid in disposition planning.   Pt is tolerating taper  / crossover from Vilazodone to Latuda , the latter reportedly having been most effective in treating pt's emotional dysregulation. Pt herself concedes that ' no medication really has ever helped me for more than a few months. Then they just seem to stop working."   Writer encouraged the pt to continue her CBT / mindfulness 'homework' assignments as per activity therapists, in an effort for the pt to lay the foundation of her CBT therapy while inpatient, with ongoing pt CBT outpatient  treatment ( individual and group possibly) in order to solidify the pt's treatment gains. Pt amenable to this plan.   Pt seen by Plastic Surgery for follow up 2/17/17 s/p pt bilateral SIB cuts to both anterior wrists. Dr Roche  ( [phone]) to see the pt for outpatient follow up after pt DC from hospital when pt is clinically stable.
" I was feeling upset this morning because I hadn't heard from my younger son . I called him and he said he hopes I'm doing ok , but it was all too much for him  after what happened, and he couldn't handle it.'
No complaints offered    Reading book in her room
Reports that she is feeling better.  Hoping to be leaving this coming week.
no complaints offered  Hoping she will be able to leave later this week.
' Did you talk to me son yet? Did you reach my doctor? I heard from Chris but not from Sajan. And I haven't heard from my brother yet.'

## 2017-02-21 NOTE — DISCHARGE NOTE BEHAVIORAL HEALTH - NSBHDCRESPONSEFT_PSY_A_CORE
Pt appeared to benefit from the safety and structure of the inpatient unit. She quickly became euthymic once she discerned that her consistently loving and supportive family had indeed remained loving and supportive.   The pt was able to make use of the group and individual meetings in order to help  the pt challenge her entrenched cognitive distortions regarding the pt's feelings og emptiness and abandonment.

## 2017-02-21 NOTE — PROGRESS NOTE BEHAVIORAL HEALTH - NSBHADMITIPBHPROVFT_PSY_A_CORE
Dr Torres Pitch

## 2017-02-21 NOTE — PROGRESS NOTE BEHAVIORAL HEALTH - NSBHADMITIPOBS_PSY_A_CORE
Enhanced supervision

## 2017-02-21 NOTE — DISCHARGE NOTE BEHAVIORAL HEALTH - NSBHDCMEDSFT_PSY_A_CORE
Vilazodone trial begun 6 weeks ago in outpatient  setting after TMS treatment with Dr Prado. Pt reported that no medication helped for very long over the years. Pt was amenable to restart Latuda , a mood stabilizer which the pt reported as having perhaps a more successful clinical effect then the other meds, " though nothing helped for more than a month or two."

## 2017-02-21 NOTE — PROGRESS NOTE BEHAVIORAL HEALTH - NSBHCHARTREVIEWVS_PSY_A_CORE FT
Vital Signs Last 24 Hrs  T(C): 36.6, Max: 36.6 (02-13 @ 20:30)  T(F): 97.9, Max: 97.9 (02-14 @ 08:27)  HR: 94 (94 - 94)  BP: 121/68 (121/68 - 121/68)  BP(mean): --  RR: 16 (16 - 16)  SpO2: 100% (95% - 100%)
Vital Signs Last 24 Hrs  T(C): 36.2, Max: 36.2 (02-19 @ 09:43)  T(F): 97.1, Max: 97.1 (02-19 @ 09:43)  HR: 86 (86 - 86)  BP: 117/80 (117/80 - 117/80)  BP(mean): --  RR: 14 (14 - 14)  SpO2: 98% (98% - 98%)
Vital Signs Last 24 Hrs  T(C): 36.6, Max: 36.6 (02-18 @ 09:15)  T(F): 97.8, Max: 97.8 (02-18 @ 09:15)  HR: 80 (80 - 80)  BP: 110/71 (110/71 - 110/71)  BP(mean): --  RR: 16 (16 - 16)  SpO2: 98% (98% - 98%)
Vital Signs Last 24 Hrs  T(C): --  T(F): --  HR: 71 (71 - 71)  BP: 98/62 (98/62 - 98/62)  BP(mean): --  RR: 14 (14 - 14)  SpO2: 98% (98% - 98%)

## 2017-02-21 NOTE — PROGRESS NOTE BEHAVIORAL HEALTH - NSBHATTESTSEENBY_PSY_A_CORE
attending Psychiatrist without NP/Trainee

## 2017-02-21 NOTE — DISCHARGE NOTE BEHAVIORAL HEALTH - NSBHDCHANDOFFFT_PSY_A_CORE
Dr Brian Prado  tel [phone] .  Writer attempted to contact Dr Prado and pt's son Chris.  Pt's son spoke with Dr Prado to reschedule pt outpatient follow up appt for 2/23/17 at 9: 30 am

## 2017-02-21 NOTE — PROGRESS NOTE BEHAVIORAL HEALTH - NSBHPTASSESSDT_PSY_A_CORE
19-Feb-2017 14:05
18-Feb-2017 14:42
20-Feb-2017 12:45
15-Feb-2017
21-Feb-2017
16-Feb-2017
17-Feb-2017

## 2017-02-21 NOTE — PROGRESS NOTE BEHAVIORAL HEALTH - MUSCLE TONE / STRENGTH
Normal muscle tone/strength

## 2017-02-21 NOTE — PROGRESS NOTE BEHAVIORAL HEALTH - PROBLEM SELECTOR PROBLEM 2
Generalized anxiety disorder

## 2017-02-21 NOTE — PROGRESS NOTE BEHAVIORAL HEALTH - PROBLEM SELECTOR PLAN 2
1. Medication regimen as above  2.Relaxation techniques, CBT  DBT

## 2017-02-21 NOTE — PROGRESS NOTE BEHAVIORAL HEALTH - PROBLEM SELECTOR PROBLEM 3
Borderline personality disorder

## 2017-02-21 NOTE — PROGRESS NOTE BEHAVIORAL HEALTH - SUMMARY
72 y.o. DWF with intractable depression who made an impulsive , serious suicide attempt 3d ago , first cutting L wrist and then deeper on R wrist"but it wasn't deep enough". Sought no care until son called yesterday "and I told him what I did".   No longer sees therapist Lizbeth Santos"because she doesn't do CBT."    Had unsuccessful TMS treatments prior to Viibyd, neither which have had efficacy with depression. Pt. reports TMS "helped anxiety".   Pt. presents danger to self at this time    recommendation:   9.39 admission to [facility]
72 y.o. DWF with intractable depression who made an impulsive , serious suicide attempt 3d ago , first cutting L wrist and then deeper on R wrist"but it wasn't deep enough". Sought no care until son called yesterday "and I told him what I did".   No longer sees therapist Lizbeth Santos"because she doesn't do CBT."    [facility], neither which have had efficacy with depression. Pt. reports TMS "helped anxiety".   Pt. presented with impaired judgment upon admission to  on 2/13/17. With some modest clinical improvement in the context of a structured inpatient therapeutic milieu , with minor medication changes and primary focus on CBT related challenges to the pt's cognitive distortions, the pt was able to regroup emotionally. Her sleep and appetite improved and the pt continued to report a decrease in symptoms of depression and anxiety. The pt participated in therapeutic milieu activities and continued to tolerate her medication regimen with changes noted as above. ( Taper and DC of Vilazodone and crossover to restarted Latuda for ongoing treatment of the pt's longstanding affective dysregulation.   The pt continued to deny SI [facility] and was looking forward to DC home and getting on 'with all the activities I do at home."          The pt is scheduled for DC home from hospital on 2/22/17 with follow up appt with her outpt psychiatrist Dr Prado on 2/23/17 at 9:30 am. Her medications will be e-prescribed to [facility] in [place] .    recommendation:   9.39 admission to [facility]
72 y.o. DWF with intractable depression who made an impulsive , serious suicide attempt 3d ago , first cutting L wrist and then deeper on R wrist"but it wasn't deep enough". Sought no care until son called yesterday "and I told him what I did".   No longer sees therapist Lizbeth Santos"because she doesn't do CBT."    Had unsuccessful TMS treatments prior to Viibyd, neither which have had efficacy with depression. Pt. reports TMS "helped anxiety".   Pt. presents danger to self at this time    recommendation:   9.39 admission to [facility]
72 y.o. DWF with intractable depression who made an impulsive , serious suicide attempt 3d ago , first cutting L wrist and then deeper on R wrist"but it wasn't deep enough". Sought no care until son called yesterday "and I told him what I did".   No longer sees therapist Lizbeth Santos"because she doesn't do CBT."    Had unsuccessful TMS treatments prior to Viibyd, neither which have had efficacy with depression. Pt. reports TMS "helped anxiety".   Pt. presents danger to self at this time    recommendation:   9.39 admission to [facility]
72 y.o. DWF with intractable depression who made an impulsive , serious suicide attempt 3d ago , first cutting L wrist and then deeper on R wrist"but it wasn't deep enough". Sought no care until son called yesterday "and I told him what I did".   No longer sees therapist Lizbeth Santos"because she doesn't do CBT."    Had unsuccessful TMS treatments prior to Viibyd, neither which have had efficacy with depression. Pt. reports TMS "helped anxiety".   Pt. presents danger to self at this time    recommendation:   9.39 admission to [facility]
72 y.o. DWF with intractable depression who made an impulsive , serious suicide attempt 3d ago , first cutting L wrist and then deeper on R wrist"but it wasn't deep enough". Sought no care until son called yesterday "and I told him what I did".   No longer sees therapist Lizbeth Santos"because she doesn't do CBT."    Had unsuccessful TMS treatments prior to Viibyd, neither which have had efficacy with depression. Pt. reports TMS "helped anxiety".   Pt. presents danger to self at this time    recommendation:   9.39 admission to [facility]
72 y.o. DWF with intractable depression who made an impulsive , serious suicide attempt 3d ago , first cutting L wrist and then deeper on R wrist"but it wasn't deep enough". Sought no care until son called yesterday "and I told him what I did".   No longer sees therapist Lizbeth Santos"because she doesn't do CBT."    Had unsuccessful TMS treatments prior to Viibyd, neither which have had efficacy with depression. Pt. reports TMS "helped anxiety".   Pt. presents danger to self at this time    recommendation:   9.39 admission to [facility]

## 2017-02-21 NOTE — DISCHARGE NOTE BEHAVIORAL HEALTH - REASON FOR ADMISSION
" I felt lonely and abandoned. So I cut my wrists. But I felt better as soon as my family came to my support"

## 2017-02-21 NOTE — PROGRESS NOTE BEHAVIORAL HEALTH - NSBHCONSORIP_PSY_A_CORE
Inpatient Admission...

## 2017-02-21 NOTE — PROGRESS NOTE BEHAVIORAL HEALTH - NSBHLEGALSTATUS_PSY_A_CORE
9.39 (Emergency)

## 2017-02-21 NOTE — PROGRESS NOTE BEHAVIORAL HEALTH - NSBHADMITDANGERSELF_PSY_A_CORE
suicidal behavior/suicidal ideation with plan and means
suicidal behavior/suicidal ideation with plan and means
suicidal ideation with plan and means/suicidal behavior
suicidal behavior/suicidal ideation with plan and means
suicidal ideation with plan and means/suicidal behavior

## 2017-02-21 NOTE — DISCHARGE NOTE BEHAVIORAL HEALTH - NSBHDCSUICSAFETYFT_PSY_A_CORE
1.Writer and other hospital staff have continued  to review pt coping strategies and ways to step back from impulsive cognitive distortions.  2. Pt phone contact to family, outside treatment team, 911, Crisis Hotline # given as well as pt return to her nearest ER for emergency evaluation if necessary.   Pt amenable to the above plan.

## 2017-02-21 NOTE — DISCHARGE NOTE BEHAVIORAL HEALTH - MEDICATION SUMMARY - MEDICATIONS TO TAKE
I will START or STAY ON the medications listed below when I get home from the hospital:    lurasidone 40 mg oral tablet  -- 1 tab(s) by mouth once a day (at bedtime)  for mood disorder  -- Indication: For anxious depression    melatonin 3 mg oral tablet  -- 1 tab(s) by mouth once a day (at bedtime), As needed, Insomnia  -- Indication: For Depression ( insomnia)

## 2017-02-21 NOTE — PROGRESS NOTE BEHAVIORAL HEALTH - NS ED [facility] REVIEW OF ED CHART AVAILABLE IMAGING REVIEWED
None available

## 2017-02-21 NOTE — DISCHARGE NOTE BEHAVIORAL HEALTH - HPI (INCLUDE ILLNESS QUALITY, SEVERITY, DURATION, TIMING, CONTEXT, MODIFYING FACTORS, ASSOCIATED SIGNS AND SYMPTOMS)
· Personal Collateral Phone	[phone]  · Personal Collateral Relationship	son  · Professional Collateral Name	Brian Prado MD  · Professional Collateral Phone	[phone]  ·     HPI:   Reason for Evaluation: Inpatient Psychiatry admission  Patient's Chief Complaint: "I'm fine now"  HPI (include illness quality, severity, duration, timing, context, modifying factors, associated signs and symptoms): 72 y.o. DWF with hx of chronic depression and comorbid CHASE and social anxiety disorder, living alone at her home in [place]. The pt,  who had made a suicide attempt on 2/10/16 by cutting wrists went to a local Urgent Care for evaluation of her SIB wounds after which  urgent care staff, upon hearing the circumstances of the pt's injuries, had called.  911 and the pt was transported to  ED for acute psychiatric evaluation and emergency admission for further treatment of her severe depression and anxiety. Pt. has 2 adult sons who call her q 1-2 weekly  When her older son Kris , age 45  and living in [place], called  the pt on 2/13/17 and she told him  what she had done. he came over, cleaned the wounds and told her to get follow up. Pt. went to urgent care on 2/13/17 after which the pt was transported to  ED for evaluation as above.                     At the  ED, the pt's 41 yr-old son Sajan reported that the pt had made one earlier suicide attempt attempted approximately a year ago via  CO poisoning and  the pt had been admitted to [facility]. at that time for treatment of her severe, refractory depression.                     When seen in the ED on 2/13/17 and again on 5N on 2/14/17 by this writer, the pt attempted to downplay the nature and severity of her depression and her recent suicide attempt.  The pt had denied a  specific precipitant to this  attempt. and stated that after having cut her wrists, ," I saw that I didn't  cut deep enough, so I cleaned it up. It was a mess. ".                    Interestingly, the pt noted that, " I started to feel better right away when I saw how quickly my sons and my brother in [place] came to support me. I keep thinking they won't want to support me anymore with my depression."                    The pt endorsed a neurovegetative pattern of depressive symptoms including decreased energy, anhedonia and variably  poor sleep and appetite with chronic suicidality and multiple largely unsuccessful treatments including most recently, a course of TMS x the past 1-2 months with Dr Brian Prado in [place].. Pt reported a brief period of clinical improvement after TMS, with subsequent relapse  ' like before. 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  States has no friends or social meetings. Pt. has a dog Martita  In the ED, pt had reported that after cutting her wrists, she had " planned to leave message for  my son to  the dog  before I passed out".              Reports has "lived with depression my whole life". States had course of ECT in 20's and have been tried "on every medication and combination". with minimal , always fleeting clinical efficacy.  The pt denied a h/o substance use, psychosis, HI or legal issues. The pt was admitted to   on a 9.39 emergency legal status on 2/13/17 and placed on suicide alert status for her safety. The pt was seen by the hospitalist on 2/14/17 and a Plastic Surgery consult was requested as to pt SIB to bilateral wrists on 2/10/17.        We briefly reviewed pt's current outpt med treatment regimen of Viibryd 40 mg po q am x the past 6 weeks. Pt again noted minimal clinical efficacy with this. Pt suggested a possible retrial of Latuda which the pt believed  'may have worked well for me in the past, but of course , that didn't last long either.'    SUICIDALITY RISK ASSESSMENT: · Personal Collateral Phone	[phone]  · Personal Collateral Relationship	son Chris ( tel [phone])  · Professional Collateral Name	 [phone]  ·     HPI:   Reason for Evaluation: Inpatient Psychiatry admission  Patient's Chief Complaint: "I'm fine now"  HPI (include illness quality, severity, duration, timing, context, modifying factors, associated signs and symptoms): 72 y.o. DWF with hx of chronic depression and comorbid CHASE and social anxiety disorder, living alone at her home in [place]. The pt,  who had made a suicide attempt on 2/10/16 by cutting wrists went to a local Urgent Care for evaluation of her SIB wounds after which  urgent care staff, upon hearing the circumstances of the pt's injuries, had called.  911 and the pt was transported to  ED for acute psychiatric evaluation and emergency admission for further treatment of her severe depression and anxiety. Pt. has 2 adult sons who call her q 1-2 weekly  When her older son Kris , age 45  and living in [place], called  the pt on 2/13/17 and she told him  what she had done. he came over, cleaned the wounds and told her to get follow up. Pt. went to urgent care on 2/13/17 after which the pt was transported to [facility]  ED for evaluation as above.                     At the  ED, the pt's 41 yr-old son Sajan reported that the pt had made one earlier suicide attempt attempted approximately a year ago via  CO poisoning and  the pt had been admitted to [facility]. at that time for treatment of her severe, refractory depression.                     When seen in the ED on 2/13/17 and again on 5N on 2/14/17 by this writer, the pt attempted to downplay the nature and severity of her depression and her recent suicide attempt.  The pt had denied a  specific precipitant to this  attempt. and stated that after having cut her wrists, ," I saw that I didn't  cut deep enough, so I cleaned it up. It was a mess. ".                    Interestingly, the pt noted that, " I started to feel better right away when I saw how quickly my sons and my brother in [place] came to support me. I keep thinking they won't want to support me anymore with my depression."                    The pt endorsed a neurovegetative pattern of depressive symptoms including decreased energy, anhedonia and variably  poor sleep and appetite with chronic suicidality and multiple largely unsuccessful treatments including most recently, a course of TMS x the past 1-2 months with Dr Brian Prado in [place].. Pt reported a brief period of clinical improvement after TMS, with subsequent relapse  ' like before. 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  States has no friends or social meetings. Pt. has a dog Martita  In the ED, pt had reported that after cutting her wrists, she had " planned to leave message for  my son to  the dog  before I passed out".              Reports has "lived with depression my whole life". States had course of ECT in 20's and have been tried "on every medication and combination". with minimal , always fleeting clinical efficacy.  The pt denied a h/o substance use, psychosis, HI or legal issues. The pt was admitted to   on a 9.39 emergency legal status on 2/13/17 and placed on suicide alert status for her safety. The pt was seen by the hospitalist on 2/14/17 and a Plastic Surgery consult was requested as to pt SIB to bilateral wrists on 2/10/17.        We briefly reviewed pt's current outpt med treatment regimen of Viibryd 40 mg po q am x the past 6 weeks. Pt again noted minimal clinical efficacy with this. Pt suggested a possible retrial of Latuda which the pt believed  'may have worked well for me in the past, but of course , that didn't last long either.'    SUICIDALITY RISK ASSESSMENT:

## 2017-02-21 NOTE — DISCHARGE NOTE BEHAVIORAL HEALTH - NSBHDCMEDICALFT_PSY_A_CORE
The pt was seen x 2 by Plastic surgery for evaluation and treatment of the pt's SIB bilateral anterior wrist cutting with a razor on 2/10/17 at home while the pt was feeling 'abandoned.'.   Pt did not seek medical treatment for these self inflicted wounds until 2/13/17 after she and her son went to an Urgent Care center who then referred the pt to [facility] ED for acute evaluation and admission to in psychiatry.   The pt was begun on Bactrim DS 1 tab po q 12 x 2 weeks . Pt. to recontact Plastic surgeon Dr Ware  ( [phone])on 2/28/17 for outpatient follow up. of  suture removal.

## 2017-02-21 NOTE — PROGRESS NOTE BEHAVIORAL HEALTH - NSBHADMITMEDEDUDETAILS_A_CORE FT
to resume previously effective medication for mood stabilization

## 2017-02-21 NOTE — PROGRESS NOTE BEHAVIORAL HEALTH - ABNORMAL MOVEMENTS
No abnormal movements

## 2017-02-21 NOTE — PROGRESS NOTE BEHAVIORAL HEALTH - NSBHADMITIPOBSFT_PSY_A_CORE
suicide alert status s/p suicide attempt

## 2017-02-21 NOTE — DISCHARGE NOTE BEHAVIORAL HEALTH - NSBHDCTHERAPYFT_PSY_A_CORE
structured milieu , group therapies, pet therapy, art and activity therapies including fresh air walks with staff as pt's depression quickly lifted.  Pt also given additional CBT / DBT related mindfulness 'homework ' assignments to help the pt solidify her coping skills and her safety planning in the event of future pt episodes of feeling mistakenly abandoned and alone.

## 2017-02-21 NOTE — DISCHARGE NOTE BEHAVIORAL HEALTH - NSBHDCSUICPROTECTFT_PSY_A_CORE
pt has a loving supportive family including 2 sons who live nearby and a brother who lives in [place] and who has remained in contact with the pt.

## 2017-02-21 NOTE — PROGRESS NOTE BEHAVIORAL HEALTH - OTHER
pt with ongoing longstanding focus on misperceived feelings of 'abandonment ' by family less constricted and more normal in range and intensity currently WNL though pt concedes a h/o longstanding impulsivity in the context of frequent cognitive distortions related to feelings of or fears of 'abandonment' " I feel fine. Just bored and restless with being here.'

## 2017-02-21 NOTE — PROGRESS NOTE BEHAVIORAL HEALTH - PRIMARY DX
Severe episode of recurrent major depressive disorder, without psychotic features

## 2017-02-21 NOTE — DISCHARGE NOTE BEHAVIORAL HEALTH - PROVIDER TOKENS
FREE:[LAST:[alberto],FIRST:[antonia],PHONE:[(   )    -],FAX:[(   )    -],ADDRESS:[Dr Antonia Prado -pt's outpatient psychiatrist  in [place]  Tel [phone]  Appt scheduled for 2/23/17  at 9:30 am]]

## 2017-02-22 VITALS
DIASTOLIC BLOOD PRESSURE: 58 MMHG | OXYGEN SATURATION: 100 % | TEMPERATURE: 100 F | SYSTOLIC BLOOD PRESSURE: 96 MMHG | RESPIRATION RATE: 15 BRPM | HEART RATE: 77 BPM

## 2017-02-22 RX ORDER — LURASIDONE HYDROCHLORIDE 40 MG/1
1 TABLET ORAL
Qty: 14 | Refills: 1 | OUTPATIENT
Start: 2017-02-22 | End: 2017-03-21

## 2017-02-22 RX ORDER — LANOLIN ALCOHOL/MO/W.PET/CERES
1 CREAM (GRAM) TOPICAL
Qty: 14 | Refills: 1 | OUTPATIENT
Start: 2017-02-22 | End: 2017-03-21

## 2017-02-22 RX ADMIN — Medication 1 TABLET(S): at 08:59

## 2017-02-27 DIAGNOSIS — S61.512A LACERATION WITHOUT FOREIGN BODY OF LEFT WRIST, INITIAL ENCOUNTER: ICD-10-CM

## 2017-02-27 DIAGNOSIS — F33.2 MAJOR DEPRESSIVE DISORDER, RECURRENT SEVERE WITHOUT PSYCHOTIC FEATURES: ICD-10-CM

## 2017-02-27 DIAGNOSIS — S61.511A LACERATION WITHOUT FOREIGN BODY OF RIGHT WRIST, INITIAL ENCOUNTER: ICD-10-CM
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
## 2017-02-27 DIAGNOSIS — F60.3 BORDERLINE PERSONALITY DISORDER: ICD-10-CM
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
## 2017-02-27 DIAGNOSIS — F41.0 PANIC DISORDER [EPISODIC PAROXYSMAL ANXIETY]: ICD-10-CM

## 2017-02-27 DIAGNOSIS — G47.00 INSOMNIA, UNSPECIFIED: ICD-10-CM
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
## 2017-10-27 NOTE — PROGRESS NOTE BEHAVIORAL HEALTH - NS ED BHA MED ROS ALLERGIC IMMUNOLOGIC
No complaints
(3) no apparent problem

## 2017-11-03 NOTE — BEHAVIORAL HEALTH ASSESSMENT NOTE - PROBLEM SELECTOR PROBLEM 1
Contacted pharmacy verifying it is ok to change script.    Severe episode of recurrent major depressive disorder, without psychotic features

## 2018-01-08 ENCOUNTER — TRANSCRIPTION ENCOUNTER (OUTPATIENT)
Age: 73
End: 2018-01-08

## 2018-03-31 PROBLEM — F32.9 MAJOR DEPRESSIVE DISORDER, SINGLE EPISODE, UNSPECIFIED: Chronic | Status: ACTIVE | Noted: 2017-02-13

## 2018-04-05 ENCOUNTER — OUTPATIENT (OUTPATIENT)
Dept: OUTPATIENT SERVICES | Facility: HOSPITAL | Age: 73
LOS: 1 days | End: 2018-04-05
Payer: MEDICARE

## 2018-04-05 DIAGNOSIS — M54.16 RADICULOPATHY, LUMBAR REGION: ICD-10-CM

## 2018-04-05 PROCEDURE — 62323 NJX INTERLAMINAR LMBR/SAC: CPT

## 2018-04-05 PROCEDURE — 77003 FLUOROGUIDE FOR SPINE INJECT: CPT

## 2018-04-19 ENCOUNTER — OUTPATIENT (OUTPATIENT)
Dept: OUTPATIENT SERVICES | Facility: HOSPITAL | Age: 73
LOS: 1 days | End: 2018-04-19
Payer: MEDICARE

## 2018-04-19 DIAGNOSIS — M54.16 RADICULOPATHY, LUMBAR REGION: ICD-10-CM

## 2018-04-19 PROCEDURE — 62323 NJX INTERLAMINAR LMBR/SAC: CPT

## 2018-04-19 PROCEDURE — 77003 FLUOROGUIDE FOR SPINE INJECT: CPT

## 2018-06-01 ENCOUNTER — APPOINTMENT (OUTPATIENT)
Dept: SPINE | Facility: CLINIC | Age: 73
End: 2018-06-01

## 2018-06-10 ENCOUNTER — INPATIENT (INPATIENT)
Facility: HOSPITAL | Age: 73
LOS: 2 days | Discharge: HOME | End: 2018-06-13
Attending: FAMILY MEDICINE | Admitting: FAMILY MEDICINE
Payer: MEDICARE

## 2018-06-10 VITALS — HEIGHT: 61 IN | WEIGHT: 125 LBS

## 2018-06-10 DIAGNOSIS — Z90.710 ACQUIRED ABSENCE OF BOTH CERVIX AND UTERUS: Chronic | ICD-10-CM

## 2018-06-10 LAB
ALBUMIN SERPL ELPH-MCNC: 3.9 G/DL — SIGNIFICANT CHANGE UP (ref 3.3–5)
ALP SERPL-CCNC: 78 U/L — SIGNIFICANT CHANGE UP (ref 40–120)
ALT FLD-CCNC: 26 U/L — SIGNIFICANT CHANGE UP (ref 12–78)
ANION GAP SERPL CALC-SCNC: 9 MMOL/L — SIGNIFICANT CHANGE UP (ref 5–17)
APTT BLD: 24.9 SEC — LOW (ref 27.5–37.4)
AST SERPL-CCNC: 80 U/L — HIGH (ref 15–37)
BASOPHILS # BLD AUTO: 0.03 K/UL — SIGNIFICANT CHANGE UP (ref 0–0.2)
BASOPHILS NFR BLD AUTO: 0.3 % — SIGNIFICANT CHANGE UP (ref 0–2)
BILIRUB SERPL-MCNC: 0.5 MG/DL — SIGNIFICANT CHANGE UP (ref 0.2–1.2)
BUN SERPL-MCNC: 18 MG/DL — SIGNIFICANT CHANGE UP (ref 7–23)
CALCIUM SERPL-MCNC: 9.5 MG/DL — SIGNIFICANT CHANGE UP (ref 8.5–10.1)
CHLORIDE SERPL-SCNC: 105 MMOL/L — SIGNIFICANT CHANGE UP (ref 96–108)
CO2 SERPL-SCNC: 24 MMOL/L — SIGNIFICANT CHANGE UP (ref 22–31)
CREAT SERPL-MCNC: 0.94 MG/DL — SIGNIFICANT CHANGE UP (ref 0.5–1.3)
EOSINOPHIL # BLD AUTO: 0 K/UL — SIGNIFICANT CHANGE UP (ref 0–0.5)
EOSINOPHIL NFR BLD AUTO: 0 % — SIGNIFICANT CHANGE UP (ref 0–6)
GLUCOSE SERPL-MCNC: 115 MG/DL — HIGH (ref 70–99)
HCT VFR BLD CALC: 39.5 % — SIGNIFICANT CHANGE UP (ref 34.5–45)
HGB BLD-MCNC: 13.8 G/DL — SIGNIFICANT CHANGE UP (ref 11.5–15.5)
IMM GRANULOCYTES NFR BLD AUTO: 0.7 % — SIGNIFICANT CHANGE UP (ref 0–1.5)
INR BLD: 0.95 RATIO — SIGNIFICANT CHANGE UP (ref 0.88–1.16)
LYMPHOCYTES # BLD AUTO: 0.95 K/UL — LOW (ref 1–3.3)
LYMPHOCYTES # BLD AUTO: 8.1 % — LOW (ref 13–44)
MCHC RBC-ENTMCNC: 31.9 PG — SIGNIFICANT CHANGE UP (ref 27–34)
MCHC RBC-ENTMCNC: 34.9 GM/DL — SIGNIFICANT CHANGE UP (ref 32–36)
MCV RBC AUTO: 91.4 FL — SIGNIFICANT CHANGE UP (ref 80–100)
MONOCYTES # BLD AUTO: 0.56 K/UL — SIGNIFICANT CHANGE UP (ref 0–0.9)
MONOCYTES NFR BLD AUTO: 4.8 % — SIGNIFICANT CHANGE UP (ref 2–14)
NEUTROPHILS # BLD AUTO: 10.12 K/UL — HIGH (ref 1.8–7.4)
NEUTROPHILS NFR BLD AUTO: 86.1 % — HIGH (ref 43–77)
NRBC # BLD: 0 /100 WBCS — SIGNIFICANT CHANGE UP (ref 0–0)
PLATELET # BLD AUTO: 335 K/UL — SIGNIFICANT CHANGE UP (ref 150–400)
POTASSIUM SERPL-MCNC: 3.9 MMOL/L — SIGNIFICANT CHANGE UP (ref 3.5–5.3)
POTASSIUM SERPL-SCNC: 3.9 MMOL/L — SIGNIFICANT CHANGE UP (ref 3.5–5.3)
PROT SERPL-MCNC: 7.6 GM/DL — SIGNIFICANT CHANGE UP (ref 6–8.3)
PROTHROM AB SERPL-ACNC: 10.2 SEC — SIGNIFICANT CHANGE UP (ref 9.8–12.7)
RBC # BLD: 4.32 M/UL — SIGNIFICANT CHANGE UP (ref 3.8–5.2)
RBC # FLD: 12.1 % — SIGNIFICANT CHANGE UP (ref 10.3–14.5)
SODIUM SERPL-SCNC: 138 MMOL/L — SIGNIFICANT CHANGE UP (ref 135–145)
TROPONIN I SERPL-MCNC: 8.35 NG/ML — HIGH (ref 0.01–0.04)
WBC # BLD: 11.74 K/UL — HIGH (ref 3.8–10.5)
WBC # FLD AUTO: 11.74 K/UL — HIGH (ref 3.8–10.5)

## 2018-06-10 PROCEDURE — 93010 ELECTROCARDIOGRAM REPORT: CPT

## 2018-06-10 PROCEDURE — 71045 X-RAY EXAM CHEST 1 VIEW: CPT | Mod: 26

## 2018-06-10 RX ORDER — CLOPIDOGREL BISULFATE 75 MG/1
300 TABLET, FILM COATED ORAL ONCE
Qty: 0 | Refills: 0 | Status: COMPLETED | OUTPATIENT
Start: 2018-06-10 | End: 2018-06-10

## 2018-06-10 RX ORDER — HEPARIN SODIUM 5000 [USP'U]/ML
3500 INJECTION INTRAVENOUS; SUBCUTANEOUS EVERY 6 HOURS
Qty: 0 | Refills: 0 | Status: DISCONTINUED | OUTPATIENT
Start: 2018-06-10 | End: 2018-06-11

## 2018-06-10 RX ORDER — SODIUM CHLORIDE 9 MG/ML
3 INJECTION INTRAMUSCULAR; INTRAVENOUS; SUBCUTANEOUS ONCE
Qty: 0 | Refills: 0 | Status: COMPLETED | OUTPATIENT
Start: 2018-06-10 | End: 2018-06-10

## 2018-06-10 RX ORDER — HEPARIN SODIUM 5000 [USP'U]/ML
3500 INJECTION INTRAVENOUS; SUBCUTANEOUS ONCE
Qty: 0 | Refills: 0 | Status: COMPLETED | OUTPATIENT
Start: 2018-06-10 | End: 2018-06-10

## 2018-06-10 RX ORDER — MORPHINE SULFATE 50 MG/1
2 CAPSULE, EXTENDED RELEASE ORAL ONCE
Qty: 0 | Refills: 0 | Status: DISCONTINUED | OUTPATIENT
Start: 2018-06-10 | End: 2018-06-10

## 2018-06-10 RX ORDER — HEPARIN SODIUM 5000 [USP'U]/ML
INJECTION INTRAVENOUS; SUBCUTANEOUS
Qty: 25000 | Refills: 0 | Status: DISCONTINUED | OUTPATIENT
Start: 2018-06-10 | End: 2018-06-11

## 2018-06-10 RX ORDER — ASPIRIN/CALCIUM CARB/MAGNESIUM 324 MG
325 TABLET ORAL ONCE
Qty: 0 | Refills: 0 | Status: COMPLETED | OUTPATIENT
Start: 2018-06-10 | End: 2018-06-10

## 2018-06-10 RX ADMIN — CLOPIDOGREL BISULFATE 300 MILLIGRAM(S): 75 TABLET, FILM COATED ORAL at 23:03

## 2018-06-10 RX ADMIN — MORPHINE SULFATE 2 MILLIGRAM(S): 50 CAPSULE, EXTENDED RELEASE ORAL at 20:54

## 2018-06-10 RX ADMIN — HEPARIN SODIUM 3500 UNIT(S): 5000 INJECTION INTRAVENOUS; SUBCUTANEOUS at 23:06

## 2018-06-10 RX ADMIN — SODIUM CHLORIDE 3 MILLILITER(S): 9 INJECTION INTRAMUSCULAR; INTRAVENOUS; SUBCUTANEOUS at 20:54

## 2018-06-10 RX ADMIN — HEPARIN SODIUM 700 UNIT(S)/HR: 5000 INJECTION INTRAVENOUS; SUBCUTANEOUS at 23:07

## 2018-06-10 RX ADMIN — Medication 325 MILLIGRAM(S): at 20:54

## 2018-06-10 RX ADMIN — MORPHINE SULFATE 2 MILLIGRAM(S): 50 CAPSULE, EXTENDED RELEASE ORAL at 21:05

## 2018-06-10 NOTE — ED STATDOCS - NS_ ATTENDINGSCRIBEDETAILS _ED_A_ED_FT
I Isidro Abraham MD saw and examined the patient. Scribe documented for me and under my supervision. I have modified the scribe's documentation where necessary to reflect my history, physical exam and other relevant documentations pertinent to the care of the patient.

## 2018-06-10 NOTE — ED STATDOCS - PROGRESS NOTE DETAILS
Marcial Cross for attending Jarad: 72 y/o f with PMHx of depression presenting to the ED c/o right parasternal CP with radiation to neck and shoulders. +n/v. Denies fever, cough, sob. No ASA taken PTA. FHx of cardiac disease. Denies cardiologist. Will send pt to main ED for further evaluation.

## 2018-06-10 NOTE — ED PROVIDER NOTE - ST/T WAVE
Flipped T in I and aVL, 0.5mm depressions v2-v5 Flipped T in I and aVL, flat T in v6, ST depression v3 and v4

## 2018-06-10 NOTE — ED PROVIDER NOTE - PROGRESS NOTE DETAILS
Spoke with Dr. Gastelum at [facility]. Will obtain bed in ccu. Advised plavix. Agrees with heparin drip. Advised npo after midnight for cath tomorrow. Toma AGUILLON Spoke wto Dr. Adhikari re admission and Flash consult Pt c/o cp . Repeat ekg ordered. Nitro pastee ordered. Toma AGUILLON Pt c/o cp . Repeat ekg ordered. Nitro paste ordered. Toma AGUILLON Spoke with DR. Vidales who recommended morphine to make pt comfortable as ekg is unchanged. Pt on heparin drip. Repeat ekg is  unchanged. Pt still having cp unchanged after nitropaste. Morphine ordered. He will do cath in am. Toma AGUILLON

## 2018-06-10 NOTE — ED ADULT NURSE NOTE - CHPI ED SYMPTOMS NEG
no vomiting/no back pain/no shortness of breath/no fever/no cough/no chills/no nausea/no dizziness/no syncope/no diaphoresis

## 2018-06-10 NOTE — ED ADULT NURSE NOTE - OBJECTIVE STATEMENT
Pt reports to ED complaining of chest pain which began earlier in the day but has become progressively worse. Pt denies any SOB, N/V.

## 2018-06-10 NOTE — ED PROVIDER NOTE - OBJECTIVE STATEMENT
72 y/o female with a PMHx of hiatal hernia and hysterectomy presents to the ED c/o an episode of chest pain this morning when she woke up this morning. Pt states she was hyperventilating and experiencing shortness of breath and felt pain in her chest and her mouth. This event lasted several hours before resolving and returning later around 1330. This second episode did not resolve until receiving Morphine and ASA in the ED. Altogether the pt experienced approximately 7 hours of CP and symptoms. Pt did not take ASA prior to coming to the ED. Pt also reports frequent belching and has been planning on making an appointment with her GI doctor. Pt is a former smoker (smoked for 50 years) but recently quit several years ago. PCP: Dr. Mckeon.

## 2018-06-11 DIAGNOSIS — I21.4 NON-ST ELEVATION (NSTEMI) MYOCARDIAL INFARCTION: ICD-10-CM

## 2018-06-11 DIAGNOSIS — K44.9 DIAPHRAGMATIC HERNIA WITHOUT OBSTRUCTION OR GANGRENE: ICD-10-CM

## 2018-06-11 DIAGNOSIS — F32.9 MAJOR DEPRESSIVE DISORDER, SINGLE EPISODE, UNSPECIFIED: ICD-10-CM

## 2018-06-11 DIAGNOSIS — D72.829 ELEVATED WHITE BLOOD CELL COUNT, UNSPECIFIED: ICD-10-CM

## 2018-06-11 DIAGNOSIS — Z29.9 ENCOUNTER FOR PROPHYLACTIC MEASURES, UNSPECIFIED: ICD-10-CM

## 2018-06-11 LAB
ADD ON TEST-SPECIMEN IN LAB: SIGNIFICANT CHANGE UP
ALBUMIN SERPL ELPH-MCNC: 3.4 G/DL — SIGNIFICANT CHANGE UP (ref 3.3–5)
ALP SERPL-CCNC: 72 U/L — SIGNIFICANT CHANGE UP (ref 40–120)
ALT FLD-CCNC: 41 U/L — SIGNIFICANT CHANGE UP (ref 12–78)
ANION GAP SERPL CALC-SCNC: 6 MMOL/L — SIGNIFICANT CHANGE UP (ref 5–17)
APTT BLD: 51.6 SEC — HIGH (ref 27.5–37.4)
AST SERPL-CCNC: 242 U/L — HIGH (ref 15–37)
BASOPHILS # BLD AUTO: 0.02 K/UL — SIGNIFICANT CHANGE UP (ref 0–0.2)
BASOPHILS NFR BLD AUTO: 0.2 % — SIGNIFICANT CHANGE UP (ref 0–2)
BILIRUB SERPL-MCNC: 0.6 MG/DL — SIGNIFICANT CHANGE UP (ref 0.2–1.2)
BLD GP AB SCN SERPL QL: SIGNIFICANT CHANGE UP
BUN SERPL-MCNC: 16 MG/DL — SIGNIFICANT CHANGE UP (ref 7–23)
CALCIUM SERPL-MCNC: 8.7 MG/DL — SIGNIFICANT CHANGE UP (ref 8.5–10.1)
CHLORIDE SERPL-SCNC: 107 MMOL/L — SIGNIFICANT CHANGE UP (ref 96–108)
CO2 SERPL-SCNC: 24 MMOL/L — SIGNIFICANT CHANGE UP (ref 22–31)
CREAT SERPL-MCNC: 0.83 MG/DL — SIGNIFICANT CHANGE UP (ref 0.5–1.3)
EOSINOPHIL # BLD AUTO: 0 K/UL — SIGNIFICANT CHANGE UP (ref 0–0.5)
EOSINOPHIL NFR BLD AUTO: 0 % — SIGNIFICANT CHANGE UP (ref 0–6)
GLUCOSE SERPL-MCNC: 110 MG/DL — HIGH (ref 70–99)
HCT VFR BLD CALC: 37.5 % — SIGNIFICANT CHANGE UP (ref 34.5–45)
HGB BLD-MCNC: 12.9 G/DL — SIGNIFICANT CHANGE UP (ref 11.5–15.5)
IMM GRANULOCYTES NFR BLD AUTO: 0.5 % — SIGNIFICANT CHANGE UP (ref 0–1.5)
LYMPHOCYTES # BLD AUTO: 1.7 K/UL — SIGNIFICANT CHANGE UP (ref 1–3.3)
LYMPHOCYTES # BLD AUTO: 14.6 % — SIGNIFICANT CHANGE UP (ref 13–44)
MCHC RBC-ENTMCNC: 31.9 PG — SIGNIFICANT CHANGE UP (ref 27–34)
MCHC RBC-ENTMCNC: 34.4 GM/DL — SIGNIFICANT CHANGE UP (ref 32–36)
MCV RBC AUTO: 92.6 FL — SIGNIFICANT CHANGE UP (ref 80–100)
MONOCYTES # BLD AUTO: 0.89 K/UL — SIGNIFICANT CHANGE UP (ref 0–0.9)
MONOCYTES NFR BLD AUTO: 7.7 % — SIGNIFICANT CHANGE UP (ref 2–14)
NEUTROPHILS # BLD AUTO: 8.96 K/UL — HIGH (ref 1.8–7.4)
NEUTROPHILS NFR BLD AUTO: 77 % — SIGNIFICANT CHANGE UP (ref 43–77)
NRBC # BLD: 0 /100 WBCS — SIGNIFICANT CHANGE UP (ref 0–0)
PLATELET # BLD AUTO: 301 K/UL — SIGNIFICANT CHANGE UP (ref 150–400)
POTASSIUM SERPL-MCNC: 3.7 MMOL/L — SIGNIFICANT CHANGE UP (ref 3.5–5.3)
POTASSIUM SERPL-SCNC: 3.7 MMOL/L — SIGNIFICANT CHANGE UP (ref 3.5–5.3)
PROT SERPL-MCNC: 6.7 GM/DL — SIGNIFICANT CHANGE UP (ref 6–8.3)
RBC # BLD: 4.05 M/UL — SIGNIFICANT CHANGE UP (ref 3.8–5.2)
RBC # FLD: 12.3 % — SIGNIFICANT CHANGE UP (ref 10.3–14.5)
SODIUM SERPL-SCNC: 137 MMOL/L — SIGNIFICANT CHANGE UP (ref 135–145)
TROPONIN I SERPL-MCNC: 19.7 NG/ML — HIGH (ref 0.01–0.04)
TROPONIN I SERPL-MCNC: 30.6 NG/ML — HIGH (ref 0.01–0.04)
TYPE + AB SCN PNL BLD: SIGNIFICANT CHANGE UP
WBC # BLD: 11.63 K/UL — HIGH (ref 3.8–10.5)
WBC # FLD AUTO: 11.63 K/UL — HIGH (ref 3.8–10.5)

## 2018-06-11 PROCEDURE — 93010 ELECTROCARDIOGRAM REPORT: CPT

## 2018-06-11 PROCEDURE — 99285 EMERGENCY DEPT VISIT HI MDM: CPT

## 2018-06-11 PROCEDURE — 93306 TTE W/DOPPLER COMPLETE: CPT | Mod: 26

## 2018-06-11 RX ORDER — MORPHINE SULFATE 50 MG/1
2 CAPSULE, EXTENDED RELEASE ORAL ONCE
Qty: 0 | Refills: 0 | Status: DISCONTINUED | OUTPATIENT
Start: 2018-06-11 | End: 2018-06-11

## 2018-06-11 RX ORDER — ACETAMINOPHEN 500 MG
650 TABLET ORAL EVERY 6 HOURS
Qty: 0 | Refills: 0 | Status: DISCONTINUED | OUTPATIENT
Start: 2018-06-11 | End: 2018-06-13

## 2018-06-11 RX ORDER — METOPROLOL TARTRATE 50 MG
12.5 TABLET ORAL DAILY
Qty: 0 | Refills: 0 | Status: DISCONTINUED | OUTPATIENT
Start: 2018-06-11 | End: 2018-06-13

## 2018-06-11 RX ORDER — LISINOPRIL 2.5 MG/1
2.5 TABLET ORAL DAILY
Qty: 0 | Refills: 0 | Status: DISCONTINUED | OUTPATIENT
Start: 2018-06-11 | End: 2018-06-12

## 2018-06-11 RX ORDER — CHOLECALCIFEROL (VITAMIN D3) 125 MCG
2000 CAPSULE ORAL DAILY
Qty: 0 | Refills: 0 | Status: DISCONTINUED | OUTPATIENT
Start: 2018-06-11 | End: 2018-06-13

## 2018-06-11 RX ORDER — NITROGLYCERIN 6.5 MG
0.4 CAPSULE, EXTENDED RELEASE ORAL
Qty: 0 | Refills: 0 | Status: DISCONTINUED | OUTPATIENT
Start: 2018-06-11 | End: 2018-06-13

## 2018-06-11 RX ORDER — ATORVASTATIN CALCIUM 80 MG/1
40 TABLET, FILM COATED ORAL ONCE
Qty: 0 | Refills: 0 | Status: COMPLETED | OUTPATIENT
Start: 2018-06-11 | End: 2018-06-11

## 2018-06-11 RX ORDER — FUROSEMIDE 40 MG
20 TABLET ORAL ONCE
Qty: 0 | Refills: 0 | Status: COMPLETED | OUTPATIENT
Start: 2018-06-11 | End: 2018-06-11

## 2018-06-11 RX ORDER — ATORVASTATIN CALCIUM 80 MG/1
40 TABLET, FILM COATED ORAL AT BEDTIME
Qty: 0 | Refills: 0 | Status: DISCONTINUED | OUTPATIENT
Start: 2018-06-11 | End: 2018-06-13

## 2018-06-11 RX ORDER — POTASSIUM CHLORIDE 20 MEQ
20 PACKET (EA) ORAL EVERY 4 HOURS
Qty: 0 | Refills: 0 | Status: DISCONTINUED | OUTPATIENT
Start: 2018-06-11 | End: 2018-06-11

## 2018-06-11 RX ORDER — ASPIRIN/CALCIUM CARB/MAGNESIUM 324 MG
81 TABLET ORAL DAILY
Qty: 0 | Refills: 0 | Status: DISCONTINUED | OUTPATIENT
Start: 2018-06-11 | End: 2018-06-11

## 2018-06-11 RX ORDER — SODIUM CHLORIDE 9 MG/ML
1000 INJECTION INTRAMUSCULAR; INTRAVENOUS; SUBCUTANEOUS
Qty: 0 | Refills: 0 | Status: DISCONTINUED | OUTPATIENT
Start: 2018-06-11 | End: 2018-06-12

## 2018-06-11 RX ORDER — MORPHINE SULFATE 50 MG/1
2 CAPSULE, EXTENDED RELEASE ORAL EVERY 4 HOURS
Qty: 0 | Refills: 0 | Status: DISCONTINUED | OUTPATIENT
Start: 2018-06-11 | End: 2018-06-13

## 2018-06-11 RX ORDER — SODIUM CHLORIDE 9 MG/ML
1000 INJECTION INTRAMUSCULAR; INTRAVENOUS; SUBCUTANEOUS ONCE
Qty: 0 | Refills: 0 | Status: COMPLETED | OUTPATIENT
Start: 2018-06-11 | End: 2018-06-11

## 2018-06-11 RX ORDER — ASPIRIN/CALCIUM CARB/MAGNESIUM 324 MG
325 TABLET ORAL DAILY
Qty: 0 | Refills: 0 | Status: DISCONTINUED | OUTPATIENT
Start: 2018-06-11 | End: 2018-06-13

## 2018-06-11 RX ORDER — DESVENLAFAXINE 50 MG/1
50 TABLET, EXTENDED RELEASE ORAL DAILY
Qty: 0 | Refills: 0 | Status: DISCONTINUED | OUTPATIENT
Start: 2018-06-11 | End: 2018-06-13

## 2018-06-11 RX ORDER — NITROGLYCERIN 6.5 MG
0.5 CAPSULE, EXTENDED RELEASE ORAL ONCE
Qty: 0 | Refills: 0 | Status: COMPLETED | OUTPATIENT
Start: 2018-06-11 | End: 2018-06-11

## 2018-06-11 RX ORDER — TRAZODONE HCL 50 MG
400 TABLET ORAL DAILY
Qty: 0 | Refills: 0 | Status: DISCONTINUED | OUTPATIENT
Start: 2018-06-11 | End: 2018-06-12

## 2018-06-11 RX ORDER — METOPROLOL TARTRATE 50 MG
12.5 TABLET ORAL
Qty: 0 | Refills: 0 | Status: DISCONTINUED | OUTPATIENT
Start: 2018-06-11 | End: 2018-06-11

## 2018-06-11 RX ORDER — MEMANTINE HYDROCHLORIDE 10 MG/1
5 TABLET ORAL AT BEDTIME
Qty: 0 | Refills: 0 | Status: DISCONTINUED | OUTPATIENT
Start: 2018-06-11 | End: 2018-06-11

## 2018-06-11 RX ORDER — QUETIAPINE FUMARATE 200 MG/1
12.5 TABLET, FILM COATED ORAL
Qty: 0 | Refills: 0 | Status: DISCONTINUED | OUTPATIENT
Start: 2018-06-11 | End: 2018-06-11

## 2018-06-11 RX ORDER — CLOPIDOGREL BISULFATE 75 MG/1
75 TABLET, FILM COATED ORAL DAILY
Qty: 0 | Refills: 0 | Status: DISCONTINUED | OUTPATIENT
Start: 2018-06-11 | End: 2018-06-13

## 2018-06-11 RX ORDER — DOCUSATE SODIUM 100 MG
200 CAPSULE ORAL
Qty: 0 | Refills: 0 | Status: DISCONTINUED | OUTPATIENT
Start: 2018-06-11 | End: 2018-06-13

## 2018-06-11 RX ORDER — VENLAFAXINE HCL 75 MG
50 CAPSULE, EXT RELEASE 24 HR ORAL DAILY
Qty: 0 | Refills: 0 | Status: DISCONTINUED | OUTPATIENT
Start: 2018-06-11 | End: 2018-06-11

## 2018-06-11 RX ADMIN — MORPHINE SULFATE 2 MILLIGRAM(S): 50 CAPSULE, EXTENDED RELEASE ORAL at 05:58

## 2018-06-11 RX ADMIN — ATORVASTATIN CALCIUM 40 MILLIGRAM(S): 80 TABLET, FILM COATED ORAL at 22:22

## 2018-06-11 RX ADMIN — DESVENLAFAXINE 50 MILLIGRAM(S): 50 TABLET, EXTENDED RELEASE ORAL at 12:36

## 2018-06-11 RX ADMIN — Medication 200 MILLIGRAM(S): at 05:23

## 2018-06-11 RX ADMIN — SODIUM CHLORIDE 1000 MILLILITER(S): 9 INJECTION INTRAMUSCULAR; INTRAVENOUS; SUBCUTANEOUS at 01:40

## 2018-06-11 RX ADMIN — MORPHINE SULFATE 2 MILLIGRAM(S): 50 CAPSULE, EXTENDED RELEASE ORAL at 01:38

## 2018-06-11 RX ADMIN — ATORVASTATIN CALCIUM 40 MILLIGRAM(S): 80 TABLET, FILM COATED ORAL at 05:23

## 2018-06-11 RX ADMIN — Medication 200 MILLIGRAM(S): at 22:23

## 2018-06-11 RX ADMIN — Medication 12.5 MILLIGRAM(S): at 05:23

## 2018-06-11 RX ADMIN — Medication 0.5 INCH(S): at 01:18

## 2018-06-11 RX ADMIN — Medication 200 MILLIGRAM(S): at 17:13

## 2018-06-11 RX ADMIN — Medication 325 MILLIGRAM(S): at 10:38

## 2018-06-11 RX ADMIN — LISINOPRIL 2.5 MILLIGRAM(S): 2.5 TABLET ORAL at 05:23

## 2018-06-11 RX ADMIN — SODIUM CHLORIDE 75 MILLILITER(S): 9 INJECTION INTRAMUSCULAR; INTRAVENOUS; SUBCUTANEOUS at 05:23

## 2018-06-11 RX ADMIN — Medication 20 MILLIGRAM(S): at 08:10

## 2018-06-11 RX ADMIN — MORPHINE SULFATE 2 MILLIGRAM(S): 50 CAPSULE, EXTENDED RELEASE ORAL at 12:52

## 2018-06-11 RX ADMIN — Medication 2000 UNIT(S): at 12:36

## 2018-06-11 RX ADMIN — CLOPIDOGREL BISULFATE 75 MILLIGRAM(S): 75 TABLET, FILM COATED ORAL at 10:38

## 2018-06-11 RX ADMIN — MORPHINE SULFATE 2 MILLIGRAM(S): 50 CAPSULE, EXTENDED RELEASE ORAL at 12:37

## 2018-06-11 RX ADMIN — Medication 0.5 INCH(S): at 12:41

## 2018-06-11 RX ADMIN — HEPARIN SODIUM 800 UNIT(S)/HR: 5000 INJECTION INTRAVENOUS; SUBCUTANEOUS at 05:52

## 2018-06-11 RX ADMIN — MORPHINE SULFATE 2 MILLIGRAM(S): 50 CAPSULE, EXTENDED RELEASE ORAL at 01:50

## 2018-06-11 RX ADMIN — SODIUM CHLORIDE 1000 MILLILITER(S): 9 INJECTION INTRAMUSCULAR; INTRAVENOUS; SUBCUTANEOUS at 23:10

## 2018-06-11 NOTE — PROGRESS NOTE ADULT - PROBLEM SELECTOR PLAN 1
- admit to CCU  - continue heparin gtt  - morphine 2 mg IV prn for pain control  - NPO after midnight for cath  - continuous pulse ox monitoring, maintain O2 >92%  - ASA 81, Plavix 75 mg daily  - CXR appears negative for acute pathology, follow up official read  - lipitor 40 mg STAT and daily  - Toprol XL 12.5 mg BID  - lisinopril 2.5 mg daily  - 1L normal saline 75 cc/hr  - follow up echo  - cardiology consult appreciated -S/p LHC, YARELI to OM2  - C/w CCU care  - Off Heparin  - C/w ASA, Plavix, statin, BB and Lisinopril   - follow lipid panel in am   - follow up echo  - D/w DR Vidales

## 2018-06-11 NOTE — H&P ADULT - ATTENDING COMMENTS
74 yo F with PMH of hiatal hernia, deprssion, p/w CP.     *NSTEMI  -Heparin drip  -ASA / Plavix  -Statin + BB  -ACEi  -Tele monitoring  -Cath in AM  -Morphine PRN  -Nitro PRN  -EKG Reviewed - no ST elevations   -Echo  -As per Dr. Chua, she called back Dr. Vidales to discuss troponins trending up from 8.350 to 19.7 and on going CP. Dr. Vidales recommends continuing current management, and morphine PRN for CP, and patient to get cardiac cath in AM     *H/o hiatal hernia / depression  -C/w outpatient management     *DVT ppx  -On heparin drip

## 2018-06-11 NOTE — H&P ADULT - NSHPPHYSICALEXAM_GEN_ALL_CORE
Vital Signs Last 24 Hrs  T(C): 36.6 (10 Clayton 2018 19:19), Max: 36.6 (10 Clayton 2018 19:19)  T(F): 97.8 (10 Clayton 2018 19:19), Max: 97.8 (10 Clayton 2018 19:19)  HR: 62 (10 Clayton 2018 19:19) (62 - 62)  BP: 153/91 (10 Clayton 2018 19:19) (153/91 - 153/91)  RR: 18 (10 Clayton 2018 19:19) (18 - 18)  SpO2: 100% (10 Clayton 2018 19:19) (100% - 100%)    PHYSICAL EXAM:  GENERAL: well-developed  HEAD:  Atraumatic, Normocephalic  EYES: EOMI, PERRLA, conjunctiva and sclera clear  NECK: Supple, No JVD  CHEST/LUNG: Clear to auscultation bilaterally; No wheeze  HEART: Regular rate and rhythm; No murmurs, rubs, or gallops  ABDOMEN: Soft, Nontender, Nondistended; Bowel sounds present  EXTREMITIES:  2+ Peripheral Pulses, No clubbing, cyanosis, or edema  PSYCH: AAOx3  NEUROLOGY: non-focal  SKIN: No rashes or lesions

## 2018-06-11 NOTE — CHART NOTE - NSCHARTNOTEFT_GEN_A_CORE
Nurse Practitioner Progress note:     HPI:  72 yo F with PMHx hiatal hernia presenting with sharp, right-sided chest pain since the morning of admission. She states the pain was constant and 8/10 in severity. Associated with right arm pain. Denies SOB, cough, palpitations or back pain. She has never had a pain like this before and did not take anything for the pain. There are no alleviating and exacerbating factors. She denies smoking, drinking and drug use.   Pt (+) trop, ruled in for NSTEMI, Pt referred for LHC with possible intervention.    s/p LHC : s/p YARELI to OM, NL LVF  Pt denies chest pain/SOB/palpitations.    PHYSICAL EXAM:  V/S:  BP  122/70         HR    63     RR 16  Neurologic: Non-focal, A&Ox3.  No neuro deficits  Cardiac : (+)S1S2,RRR  Lungs: CTA B/L  Procedure Site: Rt. femoral perclose closure device noted. site benign soft no bleeding no hematoma 2+DP    PLAN: 	  -monitor pt in CCU  -VS, labs, diet, activity as per PCI orders  -IV hydration  -Encourage PO fluids  -continue ASA & plavix, b-blocker & statin.  -Plan of care D/W pt. and MD Vidales & Mackenzie  -Discussed therapeutic lifestyle changes to reduce risk factors such as following a cardiac diet, weight loss, maintaining a healthy weight, exercise, medication compliance, and regular follow-up  with MD to know our numbers (BP, cholesterol, weight, and glucose)  - Follow-up AM labs/EKG/site check  -Follow-up with attending in 1-2 weeks upon discharge.

## 2018-06-11 NOTE — CONSULT NOTE ADULT - ATTENDING COMMENTS
Case discussed with the team and all records reviewed. Greater than 50% of the visit was for coordination of care.; Total face to face time:  60   minutes. Time is exclusive of billed procedures for the same day of service

## 2018-06-11 NOTE — PATIENT PROFILE ADULT. - ABILITY TO HEAR (WITH HEARING AID OR HEARING APPLIANCE IF NORMALLY USED):
Mildly to Moderately Impaired: difficulty hearing in some environments or speaker may need to increase volume or speak distinctly/mild hearing loss left ear

## 2018-06-11 NOTE — H&P ADULT - PROBLEM SELECTOR PLAN 1
- admit to CCU  - continue heparin gtt  - morphine 2 mg IV prn for pain control  - NPO after midnight for cath  - continuous pulse ox monitoring, maintain O2 >92%  - CXR appears negative for acute pathology, follow up official read  - cardiology consult appreciated - admit to CCU  - continue heparin gtt  - morphine 2 mg IV prn for pain control  - NPO after midnight for cath  - continuous pulse ox monitoring, maintain O2 >92%  - ASA 81, Plavix 75 mg daily  - CXR appears negative for acute pathology, follow up official read  - lipitor 40 mg STAT and daily  - Toprol XL 12.5 mg BID  - lisinopril 2.5 mg daily  - 1L normal saline 75 cc/hr  - follow up echo  - cardiology consult appreciated

## 2018-06-11 NOTE — PROGRESS NOTE ADULT - SUBJECTIVE AND OBJECTIVE BOX
CC: chest  pain (11 Jun 2018 03:47)    HPI:  74 yo F with PMHx hiatal hernia presenting with sharp, right-sided chest pain since the morning of admission. She states the pain was constant and 8/10 in severity. Associated with right arm pain. Denies SOB, cough, palpitations or back pain. She has never had a pain like this before and did not take anything for the pain. There are no alleviating and exacerbating factors. She denies smoking, drinking and drug use.     In the ED, was found to have troponins elevated from 8 to 19. Dr. Vidales was notified and the patient will be getting a cath in the AM. She received a 1L normal saline bolus, ASA and Plavix loads and was placed on a heparin drip. (11 Jun 2018 03:47)    INTERVAL HPI/OVERNIGHT EVENTS:    Vital Signs Last 24 Hrs  T(C): 36.4 (11 Jun 2018 08:25), Max: 36.8 (11 Jun 2018 04:18)  T(F): 97.5 (11 Jun 2018 08:25), Max: 98.3 (11 Jun 2018 04:18)  HR: 66 (11 Jun 2018 11:45) (60 - 81)  BP: 123/67 (11 Jun 2018 11:45) (108/73 - 158/72)  BP(mean): 90 (11 Jun 2018 08:00) (83 - 90)  RR: 17 (11 Jun 2018 11:45) (14 - 24)  SpO2: 100% (11 Jun 2018 11:45) (84% - 100%)  I&O's Detail    10 Clayton 2018 07:01  -  11 Jun 2018 07:00  --------------------------------------------------------  IN:  Total IN: 0 mL    OUT:    Voided: 300 mL  Total OUT: 300 mL    Total NET: -300 mL        REVIEW OF SYSTEMS:    CONSTITUTIONAL: No weakness, fevers or chills  EYES/ENT: No visual changes;  No vertigo or throat pain   NECK: No pain or stiffness  RESPIRATORY: No cough, wheezing, hemoptysis; No shortness of breath  CARDIOVASCULAR: No chest pain or palpitations  GASTROINTESTINAL: No abdominal or epigastric pain. No nausea, vomiting, or hematemesis; No diarrhea or constipation. No melena or hematochezia.  GENITOURINARY: No dysuria, frequency or hematuria  NEUROLOGICAL: No numbness or weakness  SKIN: No itching, burning, rashes, or lesions   All other review of systems is negative unless indicated above.  PHYSICAL EXAM:    General: Well developed; well nourished; in no acute distress  Eyes: PERRLA, EOMI; conjunctiva and sclera clear  Head: Normocephalic; atraumatic  ENMT: No nasal discharge; airway clear  Neck: Supple; non tender; no masses  Respiratory: No wheezes, rales or rhonchi  Cardiovascular: Regular rate and rhythm. S1 and S2 Normal; No murmurs, gallops or rubs  Gastrointestinal: Soft non-tender non-distended; Normal bowel sounds  Genitourinary: No costovertebral angle tenderness  Extremities: Normal range of motion, No clubbing, cyanosis or edema  Vascular: Peripheral pulses palpable 2+ bilaterally  Neurological: Alert and oriented x4  Skin: Warm and dry. No acute rash  Lymph Nodes: No acute cervical adenopathy  Musculoskeletal: Normal gait, tone, without deformities  Psychiatric: Cooperative and appropriate  CARDIAC MARKERS ( 11 Jun 2018 05:07 )  30.600 ng/mL / x     / x     / x     / x      CARDIAC MARKERS ( 10 Clayton 2018 23:56 )  19.700 ng/mL / x     / x     / x     / x      CARDIAC MARKERS ( 10 Clayton 2018 20:49 )  8.350 ng/mL / x     / x     / x     / x                                12.9   11.63 )-----------( 301      ( 11 Jun 2018 05:06 )             37.5     11 Jun 2018 05:07    137    |  107    |  16     ----------------------------<  110    3.7     |  24     |  0.83     Ca    8.7        11 Jun 2018 05:07    TPro  6.7    /  Alb  3.4    /  TBili  0.6    /  DBili  x      /  AST  242    /  ALT  41     /  AlkPhos  72     11 Jun 2018 05:07    PT/INR - ( 10 Clayton 2018 20:49 )   PT: 10.2 sec;   INR: 0.95 ratio         PTT - ( 11 Jun 2018 05:06 )  PTT:51.6 sec  CAPILLARY BLOOD GLUCOSE        LIVER FUNCTIONS - ( 11 Jun 2018 05:07 )  Alb: 3.4 g/dL / Pro: 6.7 gm/dL / ALK PHOS: 72 U/L / ALT: 41 U/L / AST: 242 U/L / GGT: x               MEDICATIONS  (STANDING):  aspirin 325 milliGRAM(s) Oral daily  atorvastatin 40 milliGRAM(s) Oral at bedtime  cholecalciferol 2000 Unit(s) Oral daily  clopidogrel Tablet 75 milliGRAM(s) Oral daily  desvenlafaxine ER 50 milliGRAM(s) Oral daily  docusate sodium Oral Tab/Cap - Peds 200 milliGRAM(s) Oral two times a day  lisinopril 2.5 milliGRAM(s) Oral daily  metoprolol succinate ER 12.5 milliGRAM(s) Oral daily  sodium chloride 0.9%. 1000 milliLiter(s) (75 mL/Hr) IV Continuous <Continuous>  traZODone 400 milliGRAM(s) Oral daily    MEDICATIONS  (PRN):  acetaminophen   Tablet. 650 milliGRAM(s) Oral every 6 hours PRN Mild Pain (1 - 3)  aluminum hydroxide/magnesium hydroxide/simethicone Suspension 30 milliLiter(s) Oral every 6 hours PRN Dyspepsia  morphine  - Injectable 2 milliGRAM(s) IV Push every 4 hours PRN Severe Pain (7 - 10)  nitroglycerin     SubLingual 0.4 milliGRAM(s) SubLingual every 5 minutes PRN Chest Pain      RADIOLOGY & ADDITIONAL TESTS: CC: chest  pain (11 Jun 2018 03:47)    HPI:  72 yo F with PMHx hiatal hernia presenting with sharp, right-sided chest pain since the morning of admission. She states the pain was constant and 8/10 in severity. Associated with right arm pain. Denies SOB, cough, palpitations or back pain. She has never had a pain like this before and did not take anything for the pain. There are no alleviating and exacerbating factors. She denies smoking, drinking and drug use.     In the ED, was found to have troponins elevated from 8 to 19. Dr. Vidales was notified and the patient will be getting a cath in the AM. She received a 1L normal saline bolus, ASA and Plavix loads and was placed on a heparin drip. (11 Jun 2018 03:47)    INTERVAL HPI/ OVERNIGHT EVENTS:  Chart reviewed Pt was seen and examined, s/p LHC, stent placed. Pt tolerated procedure well. Denies CP or SOB. Tolerated water.     Vital Signs Last 24 Hrs  T(C): 36.4 (11 Jun 2018 08:25), Max: 36.8 (11 Jun 2018 04:18)  T(F): 97.5 (11 Jun 2018 08:25), Max: 98.3 (11 Jun 2018 04:18)  HR: 66 (11 Jun 2018 11:45) (60 - 81)  BP: 123/67 (11 Jun 2018 11:45) (108/73 - 158/72)  BP(mean): 90 (11 Jun 2018 08:00) (83 - 90)  RR: 17 (11 Jun 2018 11:45) (14 - 24)  SpO2: 100% (11 Jun 2018 11:45) (84% - 100%)      REVIEW OF SYSTEMS:   All other review of systems is negative unless indicated above.      PHYSICAL EXAM:  General: Well developed; well nourished; in no acute distress  Eyes: PERRLA, EOMI; conjunctiva and sclera clear  Head: Normocephalic; atraumatic  ENMT: No nasal discharge; airway clear  Neck: Supple; non tender; no masses  Respiratory: Good air entry, No wheezes, rales or rhonchi  Cardiovascular: Regular rate and rhythm. S1 and S2 Normal; No murmurs  Gastrointestinal: Soft non-tender non-distended; Normal bowel sounds  Genitourinary: No costovertebral angle tenderness  Extremities: Normal range of motion, No  edema  Vascular: Peripheral pulses palpable 2+ bilaterally  Neurological: Alert and oriented x3.   Skin: Warm and dry. No acute rash  Lymph Nodes: No acute cervical adenopathy  Musculoskeletal: Normal muscle  tone, without deformities  Psychiatric: Cooperative and appropriate    LABS:     CARDIAC MARKERS ( 11 Jun 2018 05:07 )  30.600 ng/mL / x     / x     / x     / x      CARDIAC MARKERS ( 10 Clayton 2018 23:56 )  19.700 ng/mL / x     / x     / x     / x      CARDIAC MARKERS ( 10 Clayton 2018 20:49 )  8.350 ng/mL / x     / x     / x     / x                                12.9   11.63 )-----------( 301      ( 11 Jun 2018 05:06 )             37.5     11 Jun 2018 05:07    137    |  107    |  16     ----------------------------<  110    3.7     |  24     |  0.83     Ca    8.7        11 Jun 2018 05:07    TPro  6.7    /  Alb  3.4    /  TBili  0.6    /  DBili  x      /  AST  242    /  ALT  41     /  AlkPhos  72     11 Jun 2018 05:07    PT/INR - ( 10 Clayton 2018 20:49 )   PT: 10.2 sec;   INR: 0.95 ratio    PTT - ( 11 Jun 2018 05:06 )  PTT:51.6 sec      LIVER FUNCTIONS - ( 11 Jun 2018 05:07 )  Alb: 3.4 g/dL / Pro: 6.7 gm/dL / ALK PHOS: 72 U/L / ALT: 41 U/L / AST: 242 U/L / GGT: x               MEDICATIONS  (STANDING):  aspirin 325 milliGRAM(s) Oral daily  atorvastatin 40 milliGRAM(s) Oral at bedtime  cholecalciferol 2000 Unit(s) Oral daily  clopidogrel Tablet 75 milliGRAM(s) Oral daily  desvenlafaxine ER 50 milliGRAM(s) Oral daily  docusate sodium Oral Tab/Cap - Peds 200 milliGRAM(s) Oral two times a day  lisinopril 2.5 milliGRAM(s) Oral daily  metoprolol succinate ER 12.5 milliGRAM(s) Oral daily  sodium chloride 0.9%. 1000 milliLiter(s) (75 mL/Hr) IV Continuous <Continuous>  traZODone 400 milliGRAM(s) Oral daily    MEDICATIONS  (PRN):  acetaminophen   Tablet. 650 milliGRAM(s) Oral every 6 hours PRN Mild Pain (1 - 3)  aluminum hydroxide/magnesium hydroxide/simethicone Suspension 30 milliLiter(s) Oral every 6 hours PRN Dyspepsia  morphine  - Injectable 2 milliGRAM(s) IV Push every 4 hours PRN Severe Pain (7 - 10)  nitroglycerin     SubLingual 0.4 milliGRAM(s) SubLingual every 5 minutes PRN Chest Pain      RADIOLOGY & ADDITIONAL TESTS:    EXAM:  XR CHEST AP OR PA 1V                        PROCEDURE DATE:  06/10/2018        Comparison: 3/3/2015    AP radiograph of the chest demonstrates no evidence of infiltrate,   pleural effusion or vascular congestion. No atelectasis is seen. The   cardiac silhouette is normal in size. Osseous structures are intact.    Impression: No active pulmonary disease.

## 2018-06-11 NOTE — H&P ADULT - ASSESSMENT
74 yo F with PMHx hiatal hernia and depression presenting with constant chest pain that radiates to her right arm. In the setting of elevated troponins, the patient is in ACS. Plan for cath.

## 2018-06-11 NOTE — H&P ADULT - HISTORY OF PRESENT ILLNESS
74 yo F with PMHx hiatal hernia presenting with right-sided chest pain since the morning of admission. She states the pain was constant and 8/10 in severity. Associated with right arm pain. Denies SOB, cough, palpitations or back pain. She 72 yo F with PMHx hiatal hernia presenting with sharp, right-sided chest pain since the morning of admission. She states the pain was constant and 8/10 in severity. Associated with right arm pain. Denies SOB, cough, palpitations or back pain. She has never had a pain like this before and did not take anything for the pain. There are no alleviating and exacerbating factors. She denies smoking, drinking and drug use.     In the ED, was found to have troponins elevated from 8 to 19. Dr. Vidales was notified and the patient will be getting a cath in the AM. She received a 1L normal saline bolus, ASA and Plavix loads and was placed on a heparin drip.

## 2018-06-11 NOTE — H&P ADULT - NSHPREVIEWOFSYSTEMS_GEN_ALL_CORE
REVIEW OF SYSTEMS:    CONSTITUTIONAL: No weakness, fevers or chills  EYES/ENT: No visual changes;  No vertigo or throat pain   NECK: No pain or stiffness  RESPIRATORY: No cough, wheezing, hemoptysis; No shortness of breath  CARDIOVASCULAR: + chest pain, no palpitations  GASTROINTESTINAL: No abdominal or epigastric pain. No nausea, vomiting, or hematemesis; No diarrhea or constipation. No melena or hematochezia.  GENITOURINARY: No dysuria, frequency or hematuria  NEUROLOGICAL: No numbness or weakness  SKIN: No itching, rashes

## 2018-06-11 NOTE — H&P ADULT - PROBLEM SELECTOR PLAN 3
- hold home antidepressant medications for risk of QT prolongation and elevated BP - continue home antidepressant medications

## 2018-06-11 NOTE — PROGRESS NOTE ADULT - ASSESSMENT
74 yo F with PMHx hiatal hernia and depression presenting with constant chest pain that radiates to her right arm. In the setting of elevated troponins, the patient is in ACS. Plan for cath. 74 yo F with PMHx hiatal hernia and depression admitted with CP.

## 2018-06-12 LAB
ANION GAP SERPL CALC-SCNC: 6 MMOL/L — SIGNIFICANT CHANGE UP (ref 5–17)
BUN SERPL-MCNC: 16 MG/DL — SIGNIFICANT CHANGE UP (ref 7–23)
CALCIUM SERPL-MCNC: 8.2 MG/DL — LOW (ref 8.5–10.1)
CHLORIDE SERPL-SCNC: 109 MMOL/L — HIGH (ref 96–108)
CO2 SERPL-SCNC: 25 MMOL/L — SIGNIFICANT CHANGE UP (ref 22–31)
CREAT SERPL-MCNC: 0.75 MG/DL — SIGNIFICANT CHANGE UP (ref 0.5–1.3)
GLUCOSE SERPL-MCNC: 102 MG/DL — HIGH (ref 70–99)
HCT VFR BLD CALC: 34.9 % — SIGNIFICANT CHANGE UP (ref 34.5–45)
HCT VFR BLD CALC: 36.2 % — SIGNIFICANT CHANGE UP (ref 34.5–45)
HGB BLD-MCNC: 11.6 G/DL — SIGNIFICANT CHANGE UP (ref 11.5–15.5)
HGB BLD-MCNC: 12 G/DL — SIGNIFICANT CHANGE UP (ref 11.5–15.5)
MCHC RBC-ENTMCNC: 30.7 PG — SIGNIFICANT CHANGE UP (ref 27–34)
MCHC RBC-ENTMCNC: 31.3 PG — SIGNIFICANT CHANGE UP (ref 27–34)
MCHC RBC-ENTMCNC: 33.1 GM/DL — SIGNIFICANT CHANGE UP (ref 32–36)
MCHC RBC-ENTMCNC: 33.2 GM/DL — SIGNIFICANT CHANGE UP (ref 32–36)
MCV RBC AUTO: 92.3 FL — SIGNIFICANT CHANGE UP (ref 80–100)
MCV RBC AUTO: 94.5 FL — SIGNIFICANT CHANGE UP (ref 80–100)
NRBC # BLD: 0 /100 WBCS — SIGNIFICANT CHANGE UP (ref 0–0)
NRBC # BLD: 0 /100 WBCS — SIGNIFICANT CHANGE UP (ref 0–0)
PLATELET # BLD AUTO: 252 K/UL — SIGNIFICANT CHANGE UP (ref 150–400)
PLATELET # BLD AUTO: 267 K/UL — SIGNIFICANT CHANGE UP (ref 150–400)
POTASSIUM SERPL-MCNC: 3.6 MMOL/L — SIGNIFICANT CHANGE UP (ref 3.5–5.3)
POTASSIUM SERPL-SCNC: 3.6 MMOL/L — SIGNIFICANT CHANGE UP (ref 3.5–5.3)
RBC # BLD: 3.78 M/UL — LOW (ref 3.8–5.2)
RBC # BLD: 3.83 M/UL — SIGNIFICANT CHANGE UP (ref 3.8–5.2)
RBC # FLD: 12.4 % — SIGNIFICANT CHANGE UP (ref 10.3–14.5)
RBC # FLD: 12.5 % — SIGNIFICANT CHANGE UP (ref 10.3–14.5)
SODIUM SERPL-SCNC: 140 MMOL/L — SIGNIFICANT CHANGE UP (ref 135–145)
WBC # BLD: 10.37 K/UL — SIGNIFICANT CHANGE UP (ref 3.8–10.5)
WBC # BLD: 11.94 K/UL — HIGH (ref 3.8–10.5)
WBC # FLD AUTO: 10.37 K/UL — SIGNIFICANT CHANGE UP (ref 3.8–10.5)
WBC # FLD AUTO: 11.94 K/UL — HIGH (ref 3.8–10.5)

## 2018-06-12 PROCEDURE — 71045 X-RAY EXAM CHEST 1 VIEW: CPT | Mod: 26

## 2018-06-12 PROCEDURE — 93010 ELECTROCARDIOGRAM REPORT: CPT

## 2018-06-12 RX ORDER — PHENYLEPHRINE HYDROCHLORIDE 10 MG/ML
0.2 INJECTION INTRAVENOUS ONCE
Qty: 0 | Refills: 0 | Status: DISCONTINUED | OUTPATIENT
Start: 2018-06-12 | End: 2018-06-12

## 2018-06-12 RX ORDER — PHENYLEPHRINE HYDROCHLORIDE 10 MG/ML
200 INJECTION INTRAVENOUS ONCE
Qty: 0 | Refills: 0 | Status: DISCONTINUED | OUTPATIENT
Start: 2018-06-12 | End: 2018-06-12

## 2018-06-12 RX ORDER — TRAZODONE HCL 50 MG
200 TABLET ORAL AT BEDTIME
Qty: 0 | Refills: 0 | Status: DISCONTINUED | OUTPATIENT
Start: 2018-06-12 | End: 2018-06-13

## 2018-06-12 RX ORDER — SODIUM CHLORIDE 9 MG/ML
1000 INJECTION INTRAMUSCULAR; INTRAVENOUS; SUBCUTANEOUS ONCE
Qty: 0 | Refills: 0 | Status: COMPLETED | OUTPATIENT
Start: 2018-06-12 | End: 2018-06-12

## 2018-06-12 RX ADMIN — CLOPIDOGREL BISULFATE 75 MILLIGRAM(S): 75 TABLET, FILM COATED ORAL at 10:20

## 2018-06-12 RX ADMIN — Medication 325 MILLIGRAM(S): at 10:19

## 2018-06-12 RX ADMIN — ATORVASTATIN CALCIUM 40 MILLIGRAM(S): 80 TABLET, FILM COATED ORAL at 22:37

## 2018-06-12 RX ADMIN — Medication 200 MILLIGRAM(S): at 23:09

## 2018-06-12 RX ADMIN — Medication 200 MILLIGRAM(S): at 17:19

## 2018-06-12 RX ADMIN — Medication 2000 UNIT(S): at 10:20

## 2018-06-12 RX ADMIN — Medication 200 MILLIGRAM(S): at 10:19

## 2018-06-12 RX ADMIN — SODIUM CHLORIDE 1000 MILLILITER(S): 9 INJECTION INTRAMUSCULAR; INTRAVENOUS; SUBCUTANEOUS at 05:32

## 2018-06-12 RX ADMIN — DESVENLAFAXINE 50 MILLIGRAM(S): 50 TABLET, EXTENDED RELEASE ORAL at 12:28

## 2018-06-12 NOTE — PHYSICAL THERAPY INITIAL EVALUATION ADULT - DIAGNOSIS, PT EVAL
Chest pain, elevated troponins, NSTEMI. Chest pain, elevated troponins, NSTEMI.  Moderate to severe MR

## 2018-06-12 NOTE — PROGRESS NOTE ADULT - SUBJECTIVE AND OBJECTIVE BOX
CC: chest  pain (11 Jun 2018 03:47)    HPI:  74 yo F with PMHx hiatal hernia presenting with sharp, right-sided chest pain since the morning of admission. She states the pain was constant and 8/10 in severity. Associated with right arm pain. Denies SOB, cough, palpitations or back pain. She has never had a pain like this before and did not take anything for the pain. There are no alleviating and exacerbating factors. She denies smoking, drinking and drug use.     In the ED, was found to have troponins elevated from 8 to 19. Dr. Vidales was notified and the patient will be getting a cath in the AM. She received a 1L normal saline bolus, ASA and Plavix loads and was placed on a heparin drip. (11 Jun 2018 03:47)    INTERVAL HPI/ OVERNIGHT EVENTS:  Chart reviewed Pt was seen and examined, s/p LHC, stent placed. Pt tolerated procedure well. Denies CP or SOB. Tolerated water.     Vital Signs Last 24 Hrs  T(C): 36.7 (12 Jun 2018 07:51), Max: 37.4 (12 Jun 2018 04:30)  T(F): 98.1 (12 Jun 2018 07:51), Max: 99.3 (12 Jun 2018 04:30)  HR: 87 (12 Jun 2018 14:00) (63 - 87)  BP: 85/51 (12 Jun 2018 14:00) (57/38 - 120/74)  BP(mean): 59 (12 Jun 2018 14:00) (42 - 86)  RR: 21 (12 Jun 2018 14:00) (9 - 24)  SpO2: 98% (12 Jun 2018 14:00) (91% - 100%)      REVIEW OF SYSTEMS:   All other review of systems is negative unless indicated above.      PHYSICAL EXAM:  General: Well developed; well nourished; in no acute distress  Eyes: PERRLA, EOMI; conjunctiva and sclera clear  Head: Normocephalic; atraumatic  ENMT: No nasal discharge; airway clear  Neck: Supple; non tender; no masses  Respiratory: Good air entry, No wheezes, rales or rhonchi  Cardiovascular: Regular rate and rhythm. S1 and S2 Normal; No murmurs  Gastrointestinal: Soft non-tender non-distended; Normal bowel sounds  Genitourinary: No costovertebral angle tenderness  Extremities: Normal range of motion, No  edema  Vascular: Peripheral pulses palpable 2+ bilaterally  Neurological: Alert and oriented x3.   Skin: Warm and dry. No acute rash  Lymph Nodes: No acute cervical adenopathy  Musculoskeletal: Normal muscle  tone, without deformities  Psychiatric: Cooperative and appropriate    LABS:     CARDIAC MARKERS ( 11 Jun 2018 05:07 )  30.600 ng/mL / x     / x     / x     / x      CARDIAC MARKERS ( 10 Clayton 2018 23:56 )  19.700 ng/mL / x     / x     / x     / x      CARDIAC MARKERS ( 10 Clayton 2018 20:49 )  8.350 ng/mL / x     / x     / x     / x                                12.9   11.63 )-----------( 301      ( 11 Jun 2018 05:06 )             37.5     11 Jun 2018 05:07    137    |  107    |  16     ----------------------------<  110    3.7     |  24     |  0.83     Ca    8.7        11 Jun 2018 05:07    TPro  6.7    /  Alb  3.4    /  TBili  0.6    /  DBili  x      /  AST  242    /  ALT  41     /  AlkPhos  72     11 Jun 2018 05:07    PT/INR - ( 10 Clayton 2018 20:49 )   PT: 10.2 sec;   INR: 0.95 ratio    PTT - ( 11 Jun 2018 05:06 )  PTT:51.6 sec      LIVER FUNCTIONS - ( 11 Jun 2018 05:07 )  Alb: 3.4 g/dL / Pro: 6.7 gm/dL / ALK PHOS: 72 U/L / ALT: 41 U/L / AST: 242 U/L / GGT: x               MEDICATIONS  (STANDING):  aspirin 325 milliGRAM(s) Oral daily  atorvastatin 40 milliGRAM(s) Oral at bedtime  cholecalciferol 2000 Unit(s) Oral daily  clopidogrel Tablet 75 milliGRAM(s) Oral daily  desvenlafaxine ER 50 milliGRAM(s) Oral daily  docusate sodium Oral Tab/Cap - Peds 200 milliGRAM(s) Oral two times a day  lisinopril 2.5 milliGRAM(s) Oral daily  metoprolol succinate ER 12.5 milliGRAM(s) Oral daily  sodium chloride 0.9%. 1000 milliLiter(s) (75 mL/Hr) IV Continuous <Continuous>  traZODone 400 milliGRAM(s) Oral daily    MEDICATIONS  (PRN):  acetaminophen   Tablet. 650 milliGRAM(s) Oral every 6 hours PRN Mild Pain (1 - 3)  aluminum hydroxide/magnesium hydroxide/simethicone Suspension 30 milliLiter(s) Oral every 6 hours PRN Dyspepsia  morphine  - Injectable 2 milliGRAM(s) IV Push every 4 hours PRN Severe Pain (7 - 10)  nitroglycerin     SubLingual 0.4 milliGRAM(s) SubLingual every 5 minutes PRN Chest Pain      RADIOLOGY & ADDITIONAL TESTS:    EXAM:  XR CHEST AP OR PA 1V                        PROCEDURE DATE:  06/10/2018        Comparison: 3/3/2015    AP radiograph of the chest demonstrates no evidence of infiltrate,   pleural effusion or vascular congestion. No atelectasis is seen. The   cardiac silhouette is normal in size. Osseous structures are intact.    Impression: No active pulmonary disease. CC: chest  pain (11 Jun 2018 03:47)    HPI:  74 yo F with PMHx hiatal hernia presenting with sharp, right-sided chest pain since the morning of admission. She states the pain was constant and 8/10 in severity. Associated with right arm pain. Denies SOB, cough, palpitations or back pain. She has never had a pain like this before and did not take anything for the pain. There are no alleviating and exacerbating factors. She denies smoking, drinking and drug use.     In the ED, was found to have troponins elevated from 8 to 19. Dr. Vidales was notified and the patient will be getting a cath in the AM. She received a 1L normal saline bolus, ASA and Plavix loads and was placed on a heparin drip. (11 Jun 2018 03:47)    INTERVAL HPI/ OVERNIGHT EVENTS: Pt was seen and examined, reports no complains. Denies CP or SOB, no lightheadedness or dizziness. Was OOB. POC discussed       Vital Signs Last 24 Hrs  T(C): 36.7 (12 Jun 2018 07:51), Max: 37.4 (12 Jun 2018 04:30)  T(F): 98.1 (12 Jun 2018 07:51), Max: 99.3 (12 Jun 2018 04:30)  HR: 87 (12 Jun 2018 14:00) (63 - 87)  BP: 85/51 (12 Jun 2018 14:00) (57/38 - 120/74)  BP(mean): 59 (12 Jun 2018 14:00) (42 - 86)  RR: 21 (12 Jun 2018 14:00) (9 - 24)  SpO2: 98% (12 Jun 2018 14:00) (91% - 100%)      REVIEW OF SYSTEMS:   All other review of systems is negative unless indicated above.      PHYSICAL EXAM:  General: Well developed; well nourished; in no acute distress  Eyes: PERRLA, EOMI; conjunctiva and sclera clear  Head: Normocephalic; atraumatic  ENMT: No nasal discharge; airway clear  Neck: Supple; non tender; no masses  Respiratory: Good air entry, No wheezes, rales or rhonchi  Cardiovascular: Regular rate and rhythm. S1 and S2 Normal; No murmurs  Gastrointestinal: Soft non-tender non-distended; Normal bowel sounds  Genitourinary: No costovertebral angle tenderness  Extremities: Normal range of motion, No  edema  Vascular: Peripheral pulses palpable 2+ bilaterally  Neurological: Alert and oriented x3.   Skin: Warm and dry. No acute rash  Lymph Nodes: No acute cervical adenopathy  Musculoskeletal: Normal muscle  tone, without deformities  Psychiatric: Cooperative and appropriate    LABS:                         11.6   10.37 )-----------( 267      ( 12 Jun 2018 05:11 )             34.9     06-12    140  |  109<H>  |  16  ----------------------------<  102<H>  3.6   |  25  |  0.75    Ca    8.2<L>      12 Jun 2018 05:11    TPro  6.7  /  Alb  3.4  /  TBili  0.6  /  DBili  x   /  AST  242<H>  /  ALT  41  /  AlkPhos  72  06-11    CARDIAC MARKERS ( 11 Jun 2018 05:07 )  30.600 ng/mL / x     / x     / x     / x      CARDIAC MARKERS ( 10 Clayton 2018 23:56 )  19.700 ng/mL / x     / x     / x     / x          LIVER FUNCTIONS - ( 11 Jun 2018 05:07 )  Alb: 3.4 g/dL / Pro: 6.7 gm/dL / ALK PHOS: 72 U/L / ALT: 41 U/L / AST: 242 U/L / GGT: x           PTT - ( 11 Jun 2018 05:06 )  PTT:51.6 sec                          12.9   11.63 )-----------( 301      ( 11 Jun 2018 05:06 )             37.5     11 Jun 2018 05:07    137    |  107    |  16     ----------------------------<  110    3.7     |  24     |  0.83     Ca    8.7        11 Jun 2018 05:07    TPro  6.7    /  Alb  3.4    /  TBili  0.6    /  DBili  x      /  AST  242    /  ALT  41     /  AlkPhos  72     11 Jun 2018 05:07    PT/INR - ( 10 Clayton 2018 20:49 )   PT: 10.2 sec;   INR: 0.95 ratio    PTT - ( 11 Jun 2018 05:06 )  PTT:51.6 sec      LIVER FUNCTIONS - ( 11 Jun 2018 05:07 )  Alb: 3.4 g/dL / Pro: 6.7 gm/dL / ALK PHOS: 72 U/L / ALT: 41 U/L / AST: 242 U/L / GGT: x               MEDICATIONS  (STANDING):  aspirin 325 milliGRAM(s) Oral daily  atorvastatin 40 milliGRAM(s) Oral at bedtime  cholecalciferol 2000 Unit(s) Oral daily  clopidogrel Tablet 75 milliGRAM(s) Oral daily  desvenlafaxine ER 50 milliGRAM(s) Oral daily  docusate sodium Oral Tab/Cap - Peds 200 milliGRAM(s) Oral two times a day  lisinopril 2.5 milliGRAM(s) Oral daily  metoprolol succinate ER 12.5 milliGRAM(s) Oral daily  sodium chloride 0.9%. 1000 milliLiter(s) (75 mL/Hr) IV Continuous <Continuous>  traZODone 400 milliGRAM(s) Oral daily    MEDICATIONS  (PRN):  acetaminophen   Tablet. 650 milliGRAM(s) Oral every 6 hours PRN Mild Pain (1 - 3)  aluminum hydroxide/magnesium hydroxide/simethicone Suspension 30 milliLiter(s) Oral every 6 hours PRN Dyspepsia  morphine  - Injectable 2 milliGRAM(s) IV Push every 4 hours PRN Severe Pain (7 - 10)  nitroglycerin     SubLingual 0.4 milliGRAM(s) SubLingual every 5 minutes PRN Chest Pain      RADIOLOGY & ADDITIONAL TESTS:     EXAM:  2D ECHOCARDIOGRAM AD    PROCEDURE DATE:  06/11/2018    Summary   Fibrocalcific changes noted to the mitral valve leaflets with preserved   leaflet excursion.   Moderate to severe (3+) mitral regurgitation is present.   Moderate (2+) tricuspid valve regurgitation is present.   The left atrium is mildly dilated.   Endocardium is not always well visualized, however, overall left   ventricular systolic function appears preserved. *Technically Difficult   Study.   Estimated left ventricular ejection fraction is 55 %.            EXAM:  XR CHEST AP OR PA 1V                        PROCEDURE DATE:  06/10/2018        Comparison: 3/3/2015    AP radiograph of the chest demonstrates no evidence of infiltrate,   pleural effusion or vascular congestion. No atelectasis is seen. The   cardiac silhouette is normal in size. Osseous structures are intact.    Impression: No active pulmonary disease.

## 2018-06-12 NOTE — PROGRESS NOTE ADULT - PROBLEM SELECTOR PLAN 1
-S/p LHC, YARELI to OM2  - C/w CCU care  - Off Heparin  - C/w ASA, Plavix, statin, BB and Lisinopril   - follow lipid panel in am   - follow up echo  - D/w DR Vidales -S/p LHC, YARELI to OM2  - C/w CCU care  - Off Heparin  - C/w ASA, Plavix, statin, BB and Lisinopril   - follow lipid panel in am   - echo: EF 50%, + 3 MR   - Hypotensive, BP meds discontinued. Received IVF overnight.  - Monitor closely   - D/w DR Vidales

## 2018-06-12 NOTE — PHYSICAL THERAPY INITIAL EVALUATION ADULT - PERTINENT HX OF CURRENT PROBLEM, REHAB EVAL
Pt admitted to  secondary to right sided chest pain. Pt admitted to  secondary to right sided chest pain. Troponin: 6/11-30.600. Pt admitted to  secondary to right sided chest pain. Troponin: 6/11-30.600. s/p  Cardiac cath showed 100% stenosis of the OM2. Successful YARELI of the OM2. Transient hypotension.

## 2018-06-12 NOTE — PROGRESS NOTE ADULT - SUBJECTIVE AND OBJECTIVE BOX
CURRENT CARDIAC WORKUP:     Transthoracic Echocardiogram (06.11.18 @ 11:28) >     Fibrocalcific changes noted to the mitral valve leaflets with preserved leaflet excursion.   Moderate to severe (3+) mitral regurgitation is present.   Moderate (2+) tricuspid valve regurgitation is present.   The left atrium is mildly dilated.   Endocardium is not always well visualized, however, overall left ventricular systolic function appears preserved. *Technically Difficult Study. Estimated left ventricular ejection fraction is 55 %.    Cardiac Cath:   YARELI of OM2    Allergies:   codeine (Rash)  penicillins (Unknown)  predniSONE (Unknown)      MEDICATIONS  (STANDING):  aspirin 325 milliGRAM(s) Oral daily  atorvastatin 40 milliGRAM(s) Oral at bedtime  cholecalciferol 2000 Unit(s) Oral daily  clopidogrel Tablet 75 milliGRAM(s) Oral daily  desvenlafaxine ER 50 milliGRAM(s) Oral daily  docusate sodium Oral Tab/Cap - Peds 200 milliGRAM(s) Oral two times a day  lisinopril 2.5 milliGRAM(s) Oral daily  metoprolol succinate ER 12.5 milliGRAM(s) Oral daily  phenylephrine   1 mG/10 mL NaCl 0.9% Injectable 0.2 milliGRAM(s) IV Push once  phenylephrine   IntraVenous Injection - Peds 200 MICROGram(s) IV Push once  sodium chloride 0.9%. 1000 milliLiter(s) (75 mL/Hr) IV Continuous <Continuous>  traZODone 400 milliGRAM(s) Oral daily    MEDICATIONS  (PRN):  acetaminophen   Tablet. 650 milliGRAM(s) Oral every 6 hours PRN Mild Pain (1 - 3)  aluminum hydroxide/magnesium hydroxide/simethicone Suspension 30 milliLiter(s) Oral every 6 hours PRN Dyspepsia  morphine  - Injectable 2 milliGRAM(s) IV Push every 4 hours PRN Severe Pain (7 - 10)  nitroglycerin     SubLingual 0.4 milliGRAM(s) SubLingual every 5 minutes PRN Chest Pain      ROS:     .ros      Vital Signs Last 24 Hrs  T(C): 36.7 (12 Jun 2018 07:51), Max: 37.4 (12 Jun 2018 04:30)  T(F): 98.1 (12 Jun 2018 07:51), Max: 99.3 (12 Jun 2018 04:30)  HR: 71 (12 Jun 2018 09:00) (60 - 77)  BP: 78/49 (12 Jun 2018 09:00) (57/38 - 123/67)  88/49  BP(mean): 56 (12 Jun 2018 09:00) (42 - 88)  RR: 22 (12 Jun 2018 09:00) (9 - 24)  SpO2: 96% (12 Jun 2018 09:00) (91% - 100%)    I&O's Summary    11 Jun 2018 07:01  -  12 Jun 2018 07:00  --------------------------------------------------------  IN: 2917 mL / OUT: 1050 mL / NET: 1867 mL        PHYSICAL EXAM:    .phy      INTERPRETATION OF TELEMETRY:    ECG:    LABS:                        11.6   10.37 )-----------( 267      ( 12 Jun 2018 05:11 )             34.9     06-12    140  |  109<H>  |  16  ----------------------------<  102<H>  3.6   |  25  |  0.75    Ca    8.2<L>      12 Jun 2018 05:11    TPro  6.7  /  Alb  3.4  /  TBili  0.6  /  DBili  x   /  AST  242<H>  /  ALT  41  /  AlkPhos  72  06-11          06-11 @ 05:07  Trop-I 30.600  CK     --  CK MB  --    06-10 @ 23:56  Trop-I 19.700  CK     --  CK MB  --    06-10 @ 20:49  Trop-I 8.350  CK     --  CK MB  --      PT/INR - ( 10 Clayton 2018 20:49 )   PT: 10.2 sec;   INR: 0.95 ratio         PTT - ( 11 Jun 2018 05:06 )  PTT:51.6 sec          RADIOLOGY & ADDITIONAL STUDIES: 74 yo female with chest pain and NSTEMI. Cardiac cath showed 100% stenosis of the OM2. Successful YARELI of the OM2. Transient hypotension overnight.  Given IVF bolus.    Currently, no chest pain. Borderline BP, asymptomatic.     CURRENT CARDIAC WORKUP:     Transthoracic Echocardiogram (06.11.18 @ 11:28) >     Fibrocalcific changes noted to the mitral valve leaflets with preserved leaflet excursion.   Moderate to severe (3+) mitral regurgitation is present.   Moderate (2+) tricuspid valve regurgitation is present.   The left atrium is mildly dilated.   Endocardium is not always well visualized, however, overall left ventricular systolic function appears preserved. *Technically Difficult Study. Estimated left ventricular ejection fraction is 55 %.    Cardiac Cath:   YARELI of OM2    Allergies:   codeine (Rash)  penicillins (Unknown)  predniSONE (Unknown)      MEDICATIONS  (STANDING):  aspirin 325 milliGRAM(s) Oral daily  atorvastatin 40 milliGRAM(s) Oral at bedtime  cholecalciferol 2000 Unit(s) Oral daily  clopidogrel Tablet 75 milliGRAM(s) Oral daily  desvenlafaxine ER 50 milliGRAM(s) Oral daily  docusate sodium Oral Tab/Cap - Peds 200 milliGRAM(s) Oral two times a day  lisinopril 2.5 milliGRAM(s) Oral daily  metoprolol succinate ER 12.5 milliGRAM(s) Oral daily  phenylephrine   1 mG/10 mL NaCl 0.9% Injectable 0.2 milliGRAM(s) IV Push once  phenylephrine   IntraVenous Injection - Peds 200 MICROGram(s) IV Push once  sodium chloride 0.9%. 1000 milliLiter(s) (75 mL/Hr) IV Continuous <Continuous>  traZODone 400 milliGRAM(s) Oral daily    MEDICATIONS  (PRN):  acetaminophen   Tablet. 650 milliGRAM(s) Oral every 6 hours PRN Mild Pain (1 - 3)  aluminum hydroxide/magnesium hydroxide/simethicone Suspension 30 milliLiter(s) Oral every 6 hours PRN Dyspepsia  morphine  - Injectable 2 milliGRAM(s) IV Push every 4 hours PRN Severe Pain (7 - 10)  nitroglycerin     SubLingual 0.4 milliGRAM(s) SubLingual every 5 minutes PRN Chest Pain      ROS:     Detailed ten system ROS was performed and was negative except for history as eluded to above.    no fever  no chills  no nausea  no vomiting  no diarrhea  no constipation  no melena  no hematochezia  no chest pain  no palpitations  no sob at rest  no dyspnea on exertion  no cough  no wheezing  no anorexia  no headache  no dizziness  no syncope  no weakness  no myalgia  no dysuria  no polyuria  no hematuria       Vital Signs Last 24 Hrs  T(C): 36.7 (12 Jun 2018 07:51), Max: 37.4 (12 Jun 2018 04:30)  T(F): 98.1 (12 Jun 2018 07:51), Max: 99.3 (12 Jun 2018 04:30)  HR: 71 (12 Jun 2018 09:00) (60 - 77)  BP: 78/49 (12 Jun 2018 09:00) (57/38 - 123/67)  88/49  BP(mean): 56 (12 Jun 2018 09:00) (42 - 88)  RR: 22 (12 Jun 2018 09:00) (9 - 24)  SpO2: 96% (12 Jun 2018 09:00) (91% - 100%)    I&O's Summary    11 Jun 2018 07:01  -  12 Jun 2018 07:00  --------------------------------------------------------  IN: 2917 mL / OUT: 1050 mL / NET: 1867 mL        PHYSICAL EXAM:    General:                Comfortable, AAO X 3, in no distress.  HEENT:                  Atraumatic, PERRLA, EOMI, conjunctiva clear.   Neck:                     Supple, no adenopathy, no thyromegaly, no JVD, no bruit.  Back:                     Symmetric, non tender.  Chest:                    Clear, B/L symmetric air entry, no tachypnea  Heart:                     S1, S2 normal, no gallop, + murmur.  Abdomen:              Soft, non-tender, bowel sounds active. No palpable masses.  Extremities:           no cyanosis, no edema. Peripheral pulses normal.  Skin:                      Skin color, texture normal. No rashes.  Neurologic:            Grossly nonfocal.       INTERPRETATION OF TELEMETRY:  Normal sinus rhythm with no tachy or shirley events     ECG:  NSR, no ST T changes of ischemia or infarction.     LABS:                        11.6   10.37 )-----------( 267      ( 12 Jun 2018 05:11 )             34.9     06-12    140  |  109<H>  |  16  ----------------------------<  102<H>  3.6   |  25  |  0.75    Ca    8.2<L>      12 Jun 2018 05:11    TPro  6.7  /  Alb  3.4  /  TBili  0.6  /  DBili  x   /  AST  242<H>  /  ALT  41  /  AlkPhos  72  06-11

## 2018-06-12 NOTE — PHYSICAL THERAPY INITIAL EVALUATION ADULT - GENERAL OBSERVATIONS, REHAB EVAL
Patient received in bed in CCU, alert and cooperative.  Patient c/o chronic LBP.  Was scheduled for surgery, not has to wait 3 months.

## 2018-06-12 NOTE — PHYSICAL THERAPY INITIAL EVALUATION ADULT - MODALITIES TREATMENT COMMENTS
Patient left OOB in chair with CBIR., all monitors intact.  Patient instructed to call for assistance back to bed or the bathroom, understands same.  Patient independent in functional mobility, no skilled need in this setting.  May ambulate per nursing.  Will d/c from PT. RN informed.

## 2018-06-12 NOTE — PROGRESS NOTE ADULT - ASSESSMENT
Cautious fluids    Reduce antihypertensives.    Update trazodone dose    Monitor hemodynamics today    Out pt echo in 6- 8 weeks for EF, MR NSTEMI  Moderate to severe MR - possible sec to MI  Transient hypotension    Suggest:    Cautious fluids  Reduce antihypertensives.  Update trazodone dose  Monitor hemodynamics today  Out pt echo in 6- 8 weeks for EF, MR

## 2018-06-12 NOTE — CHART NOTE - NSCHARTNOTEFT_GEN_A_CORE
74 yo F with PMHx hiatal hernia presenting with sharp, right-sided chest pain since the morning of admission. She states the pain was constant and 8/10 in severity. Associated with right arm pain. Denies SOB, cough, palpitations or back pain. She has never had a pain like this before and did not take anything for the pain. There are no alleviating and exacerbating factors. She denies smoking, drinking and drug use.     In the ED, was found to have troponins elevated from 8 to 19. Dr. Vidales was notified and the patient will be getting a cath in the AM. She received a 1L normal saline bolus, ASA and Plavix loads and was placed on a heparin dripp      S/P C YARELI to OM NL LVF    11;23 PM: patient  s/p cardiac cath was noted to have a BP 58/39 denies any symptoms of  cp. sob,  pain to right groin, weakness, dizziness, pt seen at bedside for low bp, pt looks well denies feeeling weak fully alert and oriented to PPT.    Vital Signs Last 24 Hrs  T(C): 36.9 (11 Jun 2018 21:48), Max: 36.9 (11 Jun 2018 16:22)  T(F): 98.4 (11 Jun 2018 21:48), Max: 98.4 (11 Jun 2018 16:22)  HR: 66 (12 Jun 2018 00:00) (60 - 81)  BP: 99/58 (12 Jun 2018 00:00) (57/38 - 144/86)  BP(mean): 67 (12 Jun 2018 00:00) (42 - 90)  RR: 9 (12 Jun 2018 00:00) (9 - 24)  SpO2: 100% (12 Jun 2018 00:00) (84% - 100%)    BP: 50/38, 82/60, 99/58    chest - clear bilat   ABD- soft nontender, no guarding  right groin-- no hematoma no swelling nontender    PLAN: NS 1000 liter bolus  cbc stat  call cardiology on call Dr. Abbott 72 yo F with PMHx hiatal hernia presenting with sharp, right-sided chest pain since the morning of admission. She states the pain was constant and 8/10 in severity. Associated with right arm pain. Denies SOB, cough, palpitations or back pain. She has never had a pain like this before and did not take anything for the pain. There are no alleviating and exacerbating factors. She denies smoking, drinking and drug use.     In the ED, was found to have troponins elevated from 8 to 19. Dr. Vidales was notified and the patient will be getting a cath in the AM. She received a 1L normal saline bolus, ASA and Plavix loads and was placed on a heparin dripp      S/P C YARELI to OM NL LVF    11;23 PM: patient  s/p cardiac cath was noted to have a BP 58/39 denies any symptoms of  cp. sob,  pain to right groin, weakness, dizziness, pt seen at bedside for low bp, pt looks well denies feeeling weak fully alert and oriented to PPT.    Vital Signs Last 24 Hrs  T(C): 36.9 (11 Jun 2018 21:48), Max: 36.9 (11 Jun 2018 16:22)  T(F): 98.4 (11 Jun 2018 21:48), Max: 98.4 (11 Jun 2018 16:22)  HR: 66 (12 Jun 2018 00:00) (60 - 81)  BP: 99/58 (12 Jun 2018 00:00) (57/38 - 144/86)  BP(mean): 67 (12 Jun 2018 00:00) (42 - 90)  RR: 9 (12 Jun 2018 00:00) (9 - 24)  SpO2: 100% (12 Jun 2018 00:00) (84% - 100%)    BP: 50/38, 82/60, 99/58    chest - clear bilat   ABD- soft nontender, no guarding  right groin-- no hematoma no swelling nontender  5AM H/H 12.9/37.5    PLAN: NS 1000 liter bolus  cbc stat  call cardiology on call Dr. Abbott 74 yo F with PMHx hiatal hernia presenting with sharp, right-sided chest pain since the morning of admission. She states the pain was constant and 8/10 in severity. Associated with right arm pain. Denies SOB, cough, palpitations or back pain. She has never had a pain like this before and did not take anything for the pain. There are no alleviating and exacerbating factors. She denies smoking, drinking and drug use.     In the ED, was found to have troponins elevated from 8 to 19. Dr. Vidales was notified and the patient will be getting a cath in the AM. She received a 1L normal saline bolus, ASA and Plavix loads and was placed on a heparin dripp      S/P C YARELI to OM NL LVF    11;23 PM: patient  s/p cardiac cath was noted to have a BP 58/39 denies any symptoms of  cp. sob,  pain to right groin, weakness, dizziness, pt seen at bedside for low bp, pt looks well denies feeeling weak fully alert and oriented to PPT.    Vital Signs Last 24 Hrs  T(C): 36.9 (11 Jun 2018 21:48), Max: 36.9 (11 Jun 2018 16:22)  T(F): 98.4 (11 Jun 2018 21:48), Max: 98.4 (11 Jun 2018 16:22)  HR: 66 (12 Jun 2018 00:00) (60 - 81)  BP: 99/58 (12 Jun 2018 00:00) (57/38 - 144/86)  BP(mean): 67 (12 Jun 2018 00:00) (42 - 90)  RR: 9 (12 Jun 2018 00:00) (9 - 24)  SpO2: 100% (12 Jun 2018 00:00) (84% - 100%)    BP: 50/38, 82/60, 99/58    chest - clear bilat nontender  ABD- soft nontender, no guarding  right groin-- no hematoma no swelling nontender  5AM H/H 12.9/37.5       PLAN: NS 1000 liter bolus  cbc stat  call cardiology on call Dr. Abbott 72 yo F with PMHx hiatal hernia presenting with sharp, right-sided chest pain since the morning of admission. She states the pain was constant and 8/10 in severity. Associated with right arm pain. Denies SOB, cough, palpitations or back pain. She has never had a pain like this before and did not take anything for the pain. There are no alleviating and exacerbating factors. She denies smoking, drinking and drug use.     In the ED, was found to have troponins elevated from 8 to 19. Dr. Vidales was notified and the patient will be getting a cath in the AM. She received a 1L normal saline bolus, ASA and Plavix loads and was placed on a heparin dripp      S/P C YARELI to OM NL LVF    11;23 PM: patient  s/p cardiac cath was noted to have a BP 58/39 1 hr after givwn trazodone 200mg po, denies any symptoms of  cp. sob,  pain to right groin, weakness, dizziness, pt seen at bedside for low bp, pt looks well denies feeeling weak fully alert and oriented to PPT.    Vital Signs Last 24 Hrs  T(C): 36.9 (11 Jun 2018 21:48), Max: 36.9 (11 Jun 2018 16:22)  T(F): 98.4 (11 Jun 2018 21:48), Max: 98.4 (11 Jun 2018 16:22)  HR: 66 (12 Jun 2018 00:00) (60 - 81)  BP: 99/58 (12 Jun 2018 00:00) (57/38 - 144/86)  BP(mean): 67 (12 Jun 2018 00:00) (42 - 90)  RR: 9 (12 Jun 2018 00:00) (9 - 24)  SpO2: 100% (12 Jun 2018 00:00) (84% - 100%)    BP: 50/38, 82/60, 99/58    chest - clear bilat nontender  ABD- soft nontender, no guarding  right groin-- no hematoma no swelling nontender  5AM H/H 12.9/37.5       PLAN: NS 1000 liter bolus  cbc stat  call cardiology on call Dr. Abbott   12:12 Am H/H 12/36

## 2018-06-13 ENCOUNTER — TRANSCRIPTION ENCOUNTER (OUTPATIENT)
Age: 73
End: 2018-06-13

## 2018-06-13 VITALS — TEMPERATURE: 98 F

## 2018-06-13 LAB
ANION GAP SERPL CALC-SCNC: 7 MMOL/L — SIGNIFICANT CHANGE UP (ref 5–17)
BUN SERPL-MCNC: 17 MG/DL — SIGNIFICANT CHANGE UP (ref 7–23)
CALCIUM SERPL-MCNC: 8.2 MG/DL — LOW (ref 8.5–10.1)
CHLORIDE SERPL-SCNC: 111 MMOL/L — HIGH (ref 96–108)
CO2 SERPL-SCNC: 25 MMOL/L — SIGNIFICANT CHANGE UP (ref 22–31)
CREAT SERPL-MCNC: 0.66 MG/DL — SIGNIFICANT CHANGE UP (ref 0.5–1.3)
GLUCOSE SERPL-MCNC: 94 MG/DL — SIGNIFICANT CHANGE UP (ref 70–99)
HCT VFR BLD CALC: 31.2 % — LOW (ref 34.5–45)
HGB BLD-MCNC: 10.5 G/DL — LOW (ref 11.5–15.5)
MCHC RBC-ENTMCNC: 31.2 PG — SIGNIFICANT CHANGE UP (ref 27–34)
MCHC RBC-ENTMCNC: 33.7 GM/DL — SIGNIFICANT CHANGE UP (ref 32–36)
MCV RBC AUTO: 92.6 FL — SIGNIFICANT CHANGE UP (ref 80–100)
NRBC # BLD: 0 /100 WBCS — SIGNIFICANT CHANGE UP (ref 0–0)
PLATELET # BLD AUTO: 237 K/UL — SIGNIFICANT CHANGE UP (ref 150–400)
POTASSIUM SERPL-MCNC: 3.6 MMOL/L — SIGNIFICANT CHANGE UP (ref 3.5–5.3)
POTASSIUM SERPL-SCNC: 3.6 MMOL/L — SIGNIFICANT CHANGE UP (ref 3.5–5.3)
RBC # BLD: 3.37 M/UL — LOW (ref 3.8–5.2)
RBC # FLD: 12.4 % — SIGNIFICANT CHANGE UP (ref 10.3–14.5)
SODIUM SERPL-SCNC: 143 MMOL/L — SIGNIFICANT CHANGE UP (ref 135–145)
WBC # BLD: 8.62 K/UL — SIGNIFICANT CHANGE UP (ref 3.8–10.5)
WBC # FLD AUTO: 8.62 K/UL — SIGNIFICANT CHANGE UP (ref 3.8–10.5)

## 2018-06-13 RX ORDER — CHOLECALCIFEROL (VITAMIN D3) 125 MCG
2000 CAPSULE ORAL
Qty: 0 | Refills: 0 | DISCHARGE
Start: 2018-06-13

## 2018-06-13 RX ORDER — METOPROLOL TARTRATE 50 MG
0.5 TABLET ORAL
Qty: 30 | Refills: 0
Start: 2018-06-13 | End: 2018-07-12

## 2018-06-13 RX ORDER — TRAZODONE HCL 50 MG
400 TABLET ORAL
Qty: 0 | Refills: 0 | COMMUNITY

## 2018-06-13 RX ORDER — CLOPIDOGREL BISULFATE 75 MG/1
1 TABLET, FILM COATED ORAL
Qty: 30 | Refills: 0
Start: 2018-06-13 | End: 2018-07-12

## 2018-06-13 RX ORDER — ATORVASTATIN CALCIUM 80 MG/1
1 TABLET, FILM COATED ORAL
Qty: 30 | Refills: 0
Start: 2018-06-13 | End: 2018-07-12

## 2018-06-13 RX ORDER — ASPIRIN/CALCIUM CARB/MAGNESIUM 324 MG
1 TABLET ORAL
Qty: 0 | Refills: 0 | DISCHARGE
Start: 2018-06-13

## 2018-06-13 RX ADMIN — Medication 2000 UNIT(S): at 13:14

## 2018-06-13 RX ADMIN — DESVENLAFAXINE 50 MILLIGRAM(S): 50 TABLET, EXTENDED RELEASE ORAL at 13:14

## 2018-06-13 RX ADMIN — Medication 12.5 MILLIGRAM(S): at 08:09

## 2018-06-13 RX ADMIN — Medication 200 MILLIGRAM(S): at 06:06

## 2018-06-13 RX ADMIN — Medication 325 MILLIGRAM(S): at 08:12

## 2018-06-13 RX ADMIN — CLOPIDOGREL BISULFATE 75 MILLIGRAM(S): 75 TABLET, FILM COATED ORAL at 08:12

## 2018-06-13 NOTE — DISCHARGE NOTE ADULT - PATIENT PORTAL LINK FT
You can access the [facility] Patient Portal, offered by [facility], by registering with the following website: [email or web address]

## 2018-06-13 NOTE — PROGRESS NOTE ADULT - ASSESSMENT
NSTEMI  Moderate to severe MR - possible sec to MI    Suggest:    Continue DAPT ASA 81 mg, Plavix 75 mg daily  Continue statins  Continue beta blockers.  Off ACEi  Follow up MR on echo in 6-8 weeks or PRN earlier if short of breath

## 2018-06-13 NOTE — DISCHARGE NOTE ADULT - MEDICATION SUMMARY - MEDICATIONS TO TAKE
I will START or STAY ON the medications listed below when I get home from the hospital:    aspirin 325 mg oral tablet  -- 1 tab(s) by mouth once a day  -- Indication: For CAD    traZODone  -- 200 milligram(s) by mouth once a day (at bedtime), As Needed  -- Indication: For Insomnia    Pristiq 50 mg oral tablet, extended release  -- 1 tab(s) by mouth once a day  -- Indication: For Depression     atorvastatin 40 mg oral tablet  -- 1 tab(s) by mouth once a day (at bedtime)  -- Indication: For HLD    clopidogrel 75 mg oral tablet  -- 1 tab(s) by mouth once a day  -- Indication: For CAD/stents     metoprolol tartrate 25 mg oral tablet  -- 0.5 tab(s) by mouth 2 times a day   -- It is very important that you take or use this exactly as directed.  Do not skip doses or discontinue unless directed by your doctor.  May cause drowsiness.  Alcohol may intensify this effect.  Use care when operating dangerous machinery.  Some non-prescription drugs may aggravate your condition.  Read all labels carefully.  If a warning appears, check with your doctor before taking.  Take with food or milk.  This drug may impair the ability to drive or operate machinery.  Use care until you become familiar with its effects.    -- Indication: For CAD    Colace 100 mg oral capsule  -- 2 cap(s) by mouth 2 times a day  -- Indication: For Constipation     cholecalciferol oral tablet  -- 2000 unit(s) by mouth once a day  -- Indication: For supplement

## 2018-06-13 NOTE — DISCHARGE NOTE ADULT - CARE PROVIDER_[facility] CALL
Lula Mckeon), Internal Medicine  [address] [place] [address]  Phone: [phone]  Fax: [phone]    Darline Vidales (MD), Cardiology; Interventional Cardiology  [address] [facility]  [place] [address]  Phone: [phone]  Fax: [phone]

## 2018-06-13 NOTE — DISCHARGE NOTE ADULT - MEDICATION SUMMARY - MEDICATIONS TO CHANGE
I will SWITCH the dose or number of times a day I take the medications listed below when I get home from the hospital:    traZODone  -- 400 milligram(s) by mouth once a day

## 2018-06-13 NOTE — DISCHARGE NOTE ADULT - ABILITY TO HEAR (WITH HEARING AID OR HEARING APPLIANCE IF NORMALLY USED):
mild hearing loss left ear/Mildly to Moderately Impaired: difficulty hearing in some environments or speaker may need to increase volume or speak distinctly

## 2018-06-13 NOTE — PROGRESS NOTE ADULT - SUBJECTIVE AND OBJECTIVE BOX
72 yo female with chest pain and NSTEMI. Cardiac cath showed 100% stenosis of the OM2. Successful YARELI of the OM2. Transient hypotension. ACE stopped.    Currently, no new events. Doing well. No chest pain. Borderline BP, asymptomatic    CURRENT CARDIAC WORKUP:       Echo:  EF nml. Mod to severe MR    Cardiac Cath:  YARELI of OM2    Allergies:   codeine (Rash)  penicillins (Unknown)  predniSONE (Unknown)      MEDICATIONS  (STANDING):  aspirin 325 milliGRAM(s) Oral daily  atorvastatin 40 milliGRAM(s) Oral at bedtime  cholecalciferol 2000 Unit(s) Oral daily  clopidogrel Tablet 75 milliGRAM(s) Oral daily  desvenlafaxine ER 50 milliGRAM(s) Oral daily  docusate sodium Oral Tab/Cap - Peds 200 milliGRAM(s) Oral two times a day  metoprolol succinate ER 12.5 milliGRAM(s) Oral daily    MEDICATIONS  (PRN):  acetaminophen   Tablet. 650 milliGRAM(s) Oral every 6 hours PRN Mild Pain (1 - 3)  aluminum hydroxide/magnesium hydroxide/simethicone Suspension 30 milliLiter(s) Oral every 6 hours PRN Dyspepsia  morphine  - Injectable 2 milliGRAM(s) IV Push every 4 hours PRN Severe Pain (7 - 10)  nitroglycerin     SubLingual 0.4 milliGRAM(s) SubLingual every 5 minutes PRN Chest Pain  traZODone 200 milliGRAM(s) Oral at bedtime PRN sleep      ROS:     Detailed ten system ROS was performed and was negative except for history as eluded to above.    no fever  no chills  no nausea  no vomiting  no diarrhea  no constipation  no melena  no hematochezia  no chest pain  no palpitations  no sob at rest  no dyspnea on exertion  no cough  no wheezing  no anorexia  no headache  no dizziness  no syncope  no weakness  no myalgia  no dysuria  no polyuria  no hematuria       Vital Signs Last 24 Hrs  T(C): 37.1 (13 Jun 2018 05:59), Max: 37.1 (12 Jun 2018 15:00)  T(F): 98.8 (13 Jun 2018 05:59), Max: 98.8 (13 Jun 2018 05:59)  HR: 68 (13 Jun 2018 05:00) (64 - 87)  BP: 86/32 (13 Jun 2018 04:00) (84/46 - 105/56)  BP(mean): 39 (13 Jun 2018 04:00) (39 - 68)  RR: 22 (13 Jun 2018 05:00) (16 - 24)  SpO2: 93% (13 Jun 2018 05:00) (93% - 99%)    I&O's Summary    12 Jun 2018 07:01  -  13 Jun 2018 07:00  --------------------------------------------------------  IN: 1184 mL / OUT: 0 mL / NET: 1184 mL        PHYSICAL EXAM:    General:                Comfortable, AAO X 3, in no distress.  HEENT:                  Atraumatic, PERRLA, EOMI, conjunctiva clear.    Neck:                     Supple, no adenopathy, no thyromegaly, no JVD, no bruit.  Back:                     Symmetric, non tender.  Chest:                    Clear, B/L symmetric air entry, no tachypnea  Heart:                     S1, S2 normal, no gallop, no murmur.  Abdomen:              Soft, non-tender, bowel sounds active. No palpable masses.  Extremities:           no cyanosis, no edema. Peripheral pulses normal.  Skin:                      Skin color, texture normal. No rashes.  Neurologic:            Grossly nonfocal.       INTERPRETATION OF TELEMETRY:  Normal sinus rhythm with no tachy or shirley events     ECG:  NSR, no ST T changes of ischemia or infarction.     LABS:                        10.5   8.62  )-----------( 237      ( 13 Jun 2018 04:49 )             31.2     06-13    143  |  111<H>  |  17  ----------------------------<  94  3.6   |  25  |  0.66    Ca    8.2<L>      13 Jun 2018 04:49

## 2018-06-13 NOTE — DISCHARGE NOTE ADULT - CARE PROVIDERS DIRECT ADDRESSES
[email or web address]

## 2018-06-13 NOTE — DISCHARGE NOTE ADULT - PLAN OF CARE
prevent c/w lipitor aspirin, plavix and metoprolol   F/u with Dr Vidales in 3-4 days improve Oral hydration   in case of dizziness or confusion, CP or SOB return to ED

## 2018-06-13 NOTE — DISCHARGE NOTE ADULT - CARE PLAN
Principal Discharge DX:	NSTEMI, initial episode of care  Goal:	prevent  Assessment and plan of treatment:	c/w lipitor aspirin, plavix and metoprolol   F/u with Dr Vidales in 3-4 days  Secondary Diagnosis:	Hypotension  Goal:	improve  Assessment and plan of treatment:	Oral hydration   in case of dizziness or confusion, CP or SOB return to ED

## 2018-06-13 NOTE — DISCHARGE NOTE ADULT - HOSPITAL COURSE
74 yo F with PMHx hiatal hernia, Depression presented  with sharp, right-sided chest pain since the morning of admission. She states the pain was constant and 8/10 in severity. Associated with right arm pain.  No SOB, cough, palpitations or back pain. She has never had a pain like this before and did not take anything for the pain.   In the ED, was found to have troponins elevated from 8 to 19.  Was admitted for NSTEMI, started on IV Heparion and was evaluated by Dr. Vidales, underwent LHC with YARELI to OM2 placement. Postprocedure complicated by hypotension. Pts meds changed and Pt received IVF.  Today Pt reports feeling well, denies CP or SOB, no lightheadedness or dizziness. Pt was OOB and ambulated not symptomatic. BP still borderline low. Pt is adamant to go home, D/w DR Vidales who cleared her for discharge     Vital Signs Last 24 Hrs  T(C): 37.1 (13 Jun 2018 05:59), Max: 37.1 (13 Jun 2018 05:59)  T(F): 98.8 (13 Jun 2018 05:59), Max: 98.8 (13 Jun 2018 05:59)  HR: 84 (13 Jun 2018 15:00) (63 - 88)  BP: 86/51 (13 Jun 2018 14:00) (84/51 - 105/56)  BP(mean): 60 (13 Jun 2018 14:00) (39 - 80)  RR: 20 (13 Jun 2018 10:00) (16 - 24)  SpO2: 98% (13 Jun 2018 10:00) (80% - 98%)    PHYSICAL EXAM:  General: Well developed; well nourished; in no acute distress  Eyes: PERRLA, EOMI; conjunctiva and sclera clear  Head: Normocephalic; atraumatic  ENMT: No nasal discharge; airway clear  Neck: Supple; non tender; no masses  Respiratory: Good air entry, No wheezes, rales or rhonchi  Cardiovascular: Regular rate and rhythm. S1 and S2 Normal; No murmurs  Gastrointestinal: Soft non-tender non-distended; Normal bowel sounds  Genitourinary: No costovertebral angle tenderness  Extremities: Normal range of motion, No  edema  Vascular: Peripheral pulses palpable 2+ bilaterally  Neurological: Alert and oriented x3.   Skin: Warm and dry. No acute rash  Lymph Nodes: No acute cervical adenopathy  Musculoskeletal: Normal muscle  tone, without deformities  Psychiatric: Cooperative and appropriate    Problem/Plan - 1:  NSTEMI, S/p LHC, YARELI to OM2  - telemetry: no events   - Off Heparin  - C/w ASA, Plavix, statin, low dose BB.  - echo: EF 50%, + 3 MR, due to MI as per cardio, plan to repeat echo in few weeks   - Hypotensive, but not symptomatic, lisinopril discontinued .   - S/p IVF   - D/w DR Vidales, cleared for d./c     Problem - 2:  Hiatal hernia,  stable  - follow up with PCP. PRN     Problem- 3:  Depression.   continue home antidepressant  Trazodone dose decreased       Problem 4 :  Leukocytosis.   likely due to MI, resolved   No  fevers.    Dispo: D/c home today

## 2018-06-17 DIAGNOSIS — D72.819 DECREASED WHITE BLOOD CELL COUNT, UNSPECIFIED: ICD-10-CM

## 2018-06-17 DIAGNOSIS — Z90.710 ACQUIRED ABSENCE OF BOTH CERVIX AND UTERUS: ICD-10-CM
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
## 2018-06-17 DIAGNOSIS — Z88.5 ALLERGY STATUS TO NARCOTIC AGENT: ICD-10-CM

## 2018-06-17 DIAGNOSIS — Z88.8 ALLERGY STATUS TO OTHER DRUGS, MEDICAMENTS AND BIOLOGICAL SUBSTANCES: ICD-10-CM

## 2018-06-17 DIAGNOSIS — I95.81 POSTPROCEDURAL HYPOTENSION: ICD-10-CM

## 2018-06-17 DIAGNOSIS — F32.9 MAJOR DEPRESSIVE DISORDER, SINGLE EPISODE, UNSPECIFIED: ICD-10-CM

## 2018-06-17 DIAGNOSIS — Z87.891 PERSONAL HISTORY OF NICOTINE DEPENDENCE: ICD-10-CM

## 2018-06-17 DIAGNOSIS — I21.4 NON-ST ELEVATION (NSTEMI) MYOCARDIAL INFARCTION: ICD-10-CM

## 2018-06-17 DIAGNOSIS — Z88.0 ALLERGY STATUS TO PENICILLIN: ICD-10-CM

## 2018-06-17 DIAGNOSIS — R07.9 CHEST PAIN, UNSPECIFIED: ICD-10-CM

## 2018-06-17 DIAGNOSIS — I34.0 NONRHEUMATIC MITRAL (VALVE) INSUFFICIENCY: ICD-10-CM
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
## 2018-06-18 ENCOUNTER — APPOINTMENT (OUTPATIENT)
Dept: NEUROSURGERY | Facility: CLINIC | Age: 73
End: 2018-06-18
Payer: MEDICARE

## 2018-06-18 VITALS
RESPIRATION RATE: 16 BRPM | WEIGHT: 129 LBS | DIASTOLIC BLOOD PRESSURE: 72 MMHG | HEART RATE: 75 BPM | SYSTOLIC BLOOD PRESSURE: 107 MMHG | BODY MASS INDEX: 26.71 KG/M2 | HEIGHT: 58.2 IN | TEMPERATURE: 98.6 F

## 2018-06-18 DIAGNOSIS — Z78.9 OTHER SPECIFIED HEALTH STATUS: ICD-10-CM

## 2018-06-18 DIAGNOSIS — Z86.79 PERSONAL HISTORY OF OTHER DISEASES OF THE CIRCULATORY SYSTEM: ICD-10-CM

## 2018-06-18 PROCEDURE — 99203 OFFICE O/P NEW LOW 30 MIN: CPT

## 2018-06-19 PROBLEM — K44.9 DIAPHRAGMATIC HERNIA WITHOUT OBSTRUCTION OR GANGRENE: Chronic | Status: ACTIVE | Noted: 2018-06-10

## 2018-07-09 PROBLEM — Z86.79 HISTORY OF CORONARY ARTERY DISEASE: Status: RESOLVED | Noted: 2018-06-18 | Resolved: 2018-07-09

## 2018-07-09 PROBLEM — Z78.9 DOES NOT USE ILLICIT DRUGS: Status: ACTIVE | Noted: 2018-06-18

## 2018-07-09 PROBLEM — Z78.9 NON-SMOKER: Status: ACTIVE | Noted: 2018-06-18

## 2018-08-07 ENCOUNTER — FORM ENCOUNTER (OUTPATIENT)
Age: 73
End: 2018-08-07

## 2018-08-08 ENCOUNTER — APPOINTMENT (OUTPATIENT)
Dept: NEUROSURGERY | Facility: CLINIC | Age: 73
End: 2018-08-08
Payer: MEDICARE

## 2018-08-08 ENCOUNTER — OUTPATIENT (OUTPATIENT)
Dept: OUTPATIENT SERVICES | Facility: HOSPITAL | Age: 73
LOS: 1 days | End: 2018-08-08
Payer: MEDICARE

## 2018-08-08 ENCOUNTER — APPOINTMENT (OUTPATIENT)
Dept: RADIOLOGY | Facility: CLINIC | Age: 73
End: 2018-08-08
Payer: MEDICARE

## 2018-08-08 VITALS
RESPIRATION RATE: 16 BRPM | WEIGHT: 127 LBS | BODY MASS INDEX: 26.3 KG/M2 | TEMPERATURE: 98 F | DIASTOLIC BLOOD PRESSURE: 68 MMHG | HEART RATE: 100 BPM | SYSTOLIC BLOOD PRESSURE: 108 MMHG | HEIGHT: 58.2 IN

## 2018-08-08 DIAGNOSIS — Z00.8 ENCOUNTER FOR OTHER GENERAL EXAMINATION: ICD-10-CM

## 2018-08-08 DIAGNOSIS — M47.816 SPONDYLOSIS W/OUT MYELOPATHY OR RADICULOPATHY, LUMBAR REGION: ICD-10-CM

## 2018-08-08 DIAGNOSIS — Z90.710 ACQUIRED ABSENCE OF BOTH CERVIX AND UTERUS: Chronic | ICD-10-CM

## 2018-08-08 PROCEDURE — 99214 OFFICE O/P EST MOD 30 MIN: CPT

## 2018-08-08 PROCEDURE — 72110 X-RAY EXAM L-2 SPINE 4/>VWS: CPT

## 2018-08-08 PROCEDURE — 72110 X-RAY EXAM L-2 SPINE 4/>VWS: CPT | Mod: 26

## 2018-08-17 NOTE — PROGRESS NOTE BEHAVIORAL HEALTH - NS ED B[facility] MED ROS CONSTITUTIONAL SYMPTOMS
Problem: Patient Care Overview  Goal: Plan of Care/Patient Progress Review  Outcome: No Change  Disoriented to time, forgetful. Pt. lethargic, but arousable. BP hypertensive, 170/85. Pt. satting in the mid 90's on 3L NC. OVSS. Pt. denies pain and nausea. Legs red, jeremy and blotchy. Left leg wound covered with mepilex, minimal drainage overnight. Pt. continues on IV antibiotics. Incontinent of urine. Pt. attempted to void, but was unsuccessful. Pt. bladder scanned for 13mL. No acute events. Pt. slept comfortably between cares. Will continue POC and notify MD with changes.        No complaints

## 2018-08-21 PROBLEM — M47.816 LUMBAR SPONDYLOSIS: Status: ACTIVE | Noted: 2018-07-09

## 2018-10-14 NOTE — ED BEHAVIORAL HEALTH ASSESSMENT NOTE - NS ED [facility] REVIEW OF ED CHART VITAL SIGNS REVIEWED
Internal Medicine Interval Note  Note Author: Luly Yepez M.D.     Name Michael Paul       1974   Age/Sex 43 y.o. male   MRN [id number]   Code Status Full     After 5PM or if no immediate response to page, please call for cross-coverage  Attending/Team: Charline/Juan Jose See Patient List for primary contact information  Call [phone] to page    1st Call - Day Intern (R1):   Dr. Yepez 2nd Call - Day Sr. Resident (R2/R3):   Dr. Pitts       Reason for interval visit  (Principal Problem)   Crohn's flare    Interval Problem Daily Status Update  (24 hours, problem oriented, brief subjective history, new lab/imaging data pertinent to that problem)   Mr. Paul, a pleasant 43-year-old male admitted on 10/12/2018 with a 10-day history of abdominal pain associated with 6-day history of bloody diarrhea.  Patient was first diagnosed with Crohn's disease in  and was in remission with cannabinoid therapy and diet control as per his own therapy.  Initially patient was on sulfasalazine and prednisolone however patient developed cellulitis while on prednisone and decided to stop medical therapy.  Patient was in remission for the last 2 years.  At baseline, patient has 2 BM per day, nonbloody without any abdominal pain.    Overnight patient states that he feels much better, his abdominal pain has improved. Pt had milk after that he had some abdominal discomfort and loose stool with streaks of blood that improved over few hours. Patient denied any fever, chills, nausea, vomiting or other symptoms.  [facility] was consulted ( [facility]) and recommended outpatient follow up. Pt is medically cleared to be discharged with outpatient follow up with PCP and GI.    Review of Systems   Constitutional: Negative for chills and fever.   HENT: Negative for congestion and sore throat.    Respiratory: Negative for cough and shortness of breath.    Cardiovascular: Negative for chest pain, palpitations and leg  swelling.   Gastrointestinal: Positive for blood in stool. Negative for abdominal pain, diarrhea, nausea and vomiting.   Genitourinary: Negative for dysuria, frequency and urgency.   Musculoskeletal: Negative for back pain and neck pain.   Skin: Negative for rash.   Neurological: Negative for dizziness and headaches.   Psychiatric/Behavioral: Negative for depression. The patient is not nervous/anxious.      Disposition/Barriers to discharge:   Medically stable to be discharged home with outpatient pcp and GI follow up    Consultants/Specialty  PCP: Pcp Pt States None    Quality Measures  Quality-Core Measures   Reviewed items::  Labs reviewed and Medications reviewed  Oneal catheter::  No Oneal  DVT prophylaxis pharmacological::  Not indicated at this time, ambulatory  DVT prophylaxis - mechanical:  SCDs      Physical Exam       Vitals:    10/13/18 1600 10/13/18 1957 10/14/18 0400 10/14/18 0700   BP: 118/59 118/73 117/75 105/69   Pulse: 92 65 68 73   Resp: 18 18 18 18   Temp: 36.8 °C (98.2 °F) 36.9 °C (98.4 °F) 36.9 °C (98.4 °F) 36.5 °C (97.7 °F)   SpO2: 98% 96% 94%    Weight:       Height:         Body mass index is 21.93 kg/m².    Oxygen Therapy:  Pulse Oximetry: 94 %, O2 (LPM): 0, O2 Delivery: None (Room Air)    Physical Exam   Constitutional: He is oriented to person, place, and time and well-developed, well-nourished, and in no distress.   HENT:   Head: Normocephalic and atraumatic.   Eyes: Pupils are equal, round, and reactive to light. EOM are normal.   Neck: Normal range of motion. Neck supple. No thyromegaly present.   Cardiovascular: Normal rate, regular rhythm and normal heart sounds.    No murmur heard.  Pulmonary/Chest: Breath sounds normal. No respiratory distress. He has no wheezes.   Abdominal: Soft. Bowel sounds are normal. He exhibits no distension. There is no tenderness.   Musculoskeletal: Normal range of motion. He exhibits no edema or deformity.   Neurological: He is alert and oriented to  person, place, and time. GCS score is 15.   Skin: Skin is warm and dry. No rash noted. He is not diaphoretic.   Psychiatric: Mood and affect normal.       Assessment/Plan     Crohn's colitis (HCC)- (present on admission)   Assessment & Plan    History of Crohn's disease, diagnosed 4 years ago, in remission for the past 2 years via self-prescribed cannabinoid therapy and dietary modifications, triggers include stress, alcohol use and unhealthy foods.  Reports onset of flare 1.5 weeks ago with abd pain, started having bloody diarrhea 6 days ago with 6-8 loose BMs per day with BRBPR  Moderately to severely active Crohn's disease per the Crohn's Disease Activity Index (score of approximately 400 points)  IV fluid discontinued  Regular diet  Continue sulfasalazine 1000 mg BID  Continue and bethany prednisone over 5 weeks  No need for antibiotics as pt is improving clinically  GI consulted, will follow up as outpatient  Pt is medically stable to be discharged with outpatient follow up with pcp and GI        Anemia   Assessment & Plan    - Pt has bloody diarrhea due to crohn's flare  - Check iron: 78. TIBC:314, Ferritin: 48.6, folate: 12.8, Vitamin B12 :1065  - f/u with pcp           None available

## 2018-10-30 NOTE — PROGRESS NOTE BEHAVIORAL HEALTH - PROBLEM SELECTOR PLAN 3
Otc Regimen: Tar shampoos alternating with Selsun Blue
1 Medication regimen as above  2.DBT  CBT to address pt emotional dysregulation and low frustration tolerance.

## 2018-11-19 NOTE — PROGRESS NOTE ADULT - PROBLEM/PLAN-3
Anesthesia ROS/Med Hx        Pulmonary Review:    Negative for pulmonary    Neuro/Psych Review:    Pt. positive for seizures  Pt. positive for headaches    Cardiovascular Review:    Pt. negative for all cardio ROS    GI/HEPATIC/RENAL Review:    Pt. positive for GERD    End/Other Review:    Pt. negative for endo/other ROS      Anesthesia Plan     ASA Status: 2  Anesthesia Type: MAC  Reviewed: Pre-Induction Reassessment, DNR Status, Medications, Problem List, NPO Status, Patient Summary, Allergies and Past Med History  The proposed anesthetic plan, including its risks and benefits, have been discussed with the Patient - along with the risks and benefits of alternatives. Questions were encouraged and answered and the patient and/or representative agrees to proceed.   Blood Products: Not Anticipated      Physical Exam  Mallampati: II  TM Distance: >3 FB  Neck ROM: Full  Cardio Rhythm: Regular  Cardio Rate: Normal  Breath sounds clear to auscultation:  Yes  pulmonary exam normal  abdominal exam normal  dental exam normal DISPLAY PLAN FREE TEXT

## 2019-05-21 ENCOUNTER — APPOINTMENT (OUTPATIENT)
Dept: NEUROSURGERY | Facility: CLINIC | Age: 74
End: 2019-05-21
Payer: MEDICARE

## 2019-05-21 DIAGNOSIS — M43.10 SPONDYLOLISTHESIS, SITE UNSPECIFIED: ICD-10-CM

## 2019-05-21 DIAGNOSIS — M54.16 RADICULOPATHY, LUMBAR REGION: ICD-10-CM

## 2019-05-21 PROCEDURE — 99213 OFFICE O/P EST LOW 20 MIN: CPT

## 2019-05-22 VITALS
TEMPERATURE: 98 F | WEIGHT: 129 LBS | SYSTOLIC BLOOD PRESSURE: 120 MMHG | BODY MASS INDEX: 24.35 KG/M2 | HEART RATE: 61 BPM | DIASTOLIC BLOOD PRESSURE: 73 MMHG | HEIGHT: 61 IN | RESPIRATION RATE: 18 BRPM

## 2019-05-22 PROBLEM — M43.10 SPONDYLOLISTHESIS: Status: ACTIVE | Noted: 2018-06-18

## 2019-05-22 PROBLEM — M54.16 BILATERAL LUMBAR RADICULOPATHY: Status: ACTIVE | Noted: 2018-07-09

## 2019-05-22 NOTE — CONSULT LETTER
[Dear  ___] : Dear  [unfilled], [Sincerely,] : Sincerely, [FreeTextEntry2] : Lula Mckeon MD\[address]\[place] [id number] [FreeTextEntry1] : Mildred Dotson is a 74-year-old female who presents today for evaluation of a parking permit. As you may recall, patient was originally seen in our office due to complaints of lower back pain with radiation into bilateral legs. Patient was noted to have bilateral radiculopathy and neurogenic claudication. Images were performed which revealed a spondylolisthesis at L4-5 and L5-S1 which is consistent with her lower back pain. Surgery was offered however patient has deferred at this time due to her cardiac history. Despite the fact that she has had some improvement she still continues to have difficulties with walking. She develops pain in both her legs and in the lower back with walking. She denies any numbness or tingling or weakness to bilateral legs. She denies any urinary or bowel dysfunction. Patient does report using a cane for ambulation.\par \par There are no new images to review with the patient.\par \par Patient is alert and oriented. No distress noted. Negative clonus. Strength to bilateral legs equal and normal. Reflexes of bilateral patella symmetric and normal. Absent bilateral Achilles tendon reflexes noted. Sensation to light touch to bilateral lower extremities equal and normal.\par \par Patient requesting a parking permit. I have filled out the form for the patient. At this time, we will follow up as needed. Patient is aware to call our office with any further questions or concerns with any new or worsening symptoms or if at any time she would like to move forward with surgical planning.\par  [FreeTextEntry3] : Latoya Terry, MSN, FNP-BC\par Nurse Practitioner\par [address] [place] [address], [facility]\[place] [address]\par Tel: [phone]\par FAX: [phone]

## 2019-08-08 NOTE — PROGRESS NOTE BEHAVIORAL HEALTH - NS ED [facility] MED ROS MUSCULOSKELETAL
Sylvain Goldstein is a 4 m.o. 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 Now s/p total component removal and EVD placement (6/10).    EVD with low output overnight. On inspection has good waveform but minimal drainage when lowered. Head CT shows collapse of left lateral ventricle body and potential trapping of right lateral ventricle.      --Continue care per primary team.  --Continue antibiotics regimen per infectious disease.  --Continue EVD open to drain at 10 cmH2O.  --Will discuss further with staff today concerning EVD function.  --Please keep pt NPO.  --We will continue to monitor closely, please contact us with any questions or concerns.      No complaints Hyperlipidemia, unspecified hyperlipidemia type

## 2019-09-25 ENCOUNTER — APPOINTMENT (OUTPATIENT)
Dept: OTOLARYNGOLOGY | Facility: CLINIC | Age: 74
End: 2019-09-25
Payer: MEDICARE

## 2019-09-25 VITALS — BODY MASS INDEX: 24.35 KG/M2 | HEIGHT: 61 IN | WEIGHT: 129 LBS

## 2019-09-25 DIAGNOSIS — H90.8 MIXED CONDUCTIVE AND SENSORINEURAL HEARING LOSS, UNSPECIFIED: ICD-10-CM

## 2019-09-25 DIAGNOSIS — H80.92 UNSPECIFIED OTOSCLEROSIS, LEFT EAR: ICD-10-CM

## 2019-09-25 PROCEDURE — 69210 REMOVE IMPACTED EAR WAX UNI: CPT

## 2019-09-25 PROCEDURE — 99204 OFFICE O/P NEW MOD 45 MIN: CPT | Mod: 25

## 2019-09-25 NOTE — CONSULT LETTER
[FreeTextEntry1] : Dear Dr. Ayon,\par \par Thank you very much for your consultation request regarding Mildred Dotson.  As you know, she is a very pleasant 74-year-old woman with a history of progressive left-sided hearing loss over many months, and possibly longer.  \par \par Her otoscopic exam today is unremarkable.  Benavides is midline and Rinne is positive bilaterally.  I reviewed an audiogram from your office which shows a left mixed hearing loss with type A tympanogram.\par \par I agree with your assessment that her audiogram and clinical exam findings appear suspicious for otosclerosis, although as you know it is uncommon for this to present so late in the eighth decade of life.  I plan to obtain a CT temporal bone to evaluate for otosclerosis and/or other potential causes of her conductive loss.  We discussed options for management of otosclerosis, including hearing aids versus surgery, and after discussion regarding risks of surgery she most likely will forward with a hearing aid evaluation through your office.  I provided her medical clearance for a hearing aid today as well.  She will followup after CT.\par \par Thank you once again for the opportunity to participate in your patient's care, and I will continue to keep you updated as to her progress.\par \par Best regards,\par \par Oz Castillo MD\par Otology/Neurotology\par Department of Otolaryngology\par [facility] [FreeTextEntry2] : Azeem Ayon, AuD\par [facility]

## 2019-11-05 ENCOUNTER — FORM ENCOUNTER (OUTPATIENT)
Age: 74
End: 2019-11-05

## 2019-11-06 ENCOUNTER — APPOINTMENT (OUTPATIENT)
Dept: CT IMAGING | Facility: CLINIC | Age: 74
End: 2019-11-06
Payer: MEDICARE

## 2019-11-06 ENCOUNTER — OUTPATIENT (OUTPATIENT)
Dept: OUTPATIENT SERVICES | Facility: HOSPITAL | Age: 74
LOS: 1 days | End: 2019-11-06
Payer: MEDICARE

## 2019-11-06 DIAGNOSIS — Z90.710 ACQUIRED ABSENCE OF BOTH CERVIX AND UTERUS: Chronic | ICD-10-CM

## 2019-11-06 DIAGNOSIS — H90.8 MIXED CONDUCTIVE AND SENSORINEURAL HEARING LOSS, UNSPECIFIED: ICD-10-CM

## 2019-11-06 DIAGNOSIS — H80.82: ICD-10-CM

## 2019-11-06 PROCEDURE — 70480 CT ORBIT/EAR/FOSSA W/O DYE: CPT | Mod: 26

## 2019-11-06 PROCEDURE — 70480 CT ORBIT/EAR/FOSSA W/O DYE: CPT

## 2020-10-07 ENCOUNTER — APPOINTMENT (OUTPATIENT)
Dept: RADIOLOGY | Facility: CLINIC | Age: 75
End: 2020-10-07
Payer: MEDICARE

## 2020-10-07 ENCOUNTER — APPOINTMENT (OUTPATIENT)
Dept: ULTRASOUND IMAGING | Facility: CLINIC | Age: 75
End: 2020-10-07
Payer: MEDICARE

## 2020-10-07 ENCOUNTER — OUTPATIENT (OUTPATIENT)
Dept: OUTPATIENT SERVICES | Facility: HOSPITAL | Age: 75
LOS: 1 days | End: 2020-10-07
Payer: MEDICARE

## 2020-10-07 ENCOUNTER — APPOINTMENT (OUTPATIENT)
Dept: MAMMOGRAPHY | Facility: CLINIC | Age: 75
End: 2020-10-07
Payer: MEDICARE

## 2020-10-07 DIAGNOSIS — Z90.710 ACQUIRED ABSENCE OF BOTH CERVIX AND UTERUS: Chronic | ICD-10-CM

## 2020-10-07 DIAGNOSIS — Z00.8 ENCOUNTER FOR OTHER GENERAL EXAMINATION: ICD-10-CM

## 2020-10-07 PROCEDURE — 76641 ULTRASOUND BREAST COMPLETE: CPT

## 2020-10-07 PROCEDURE — 77065 DX MAMMO INCL CAD UNI: CPT | Mod: 26,GG,RT

## 2020-10-07 PROCEDURE — 76641 ULTRASOUND BREAST COMPLETE: CPT | Mod: 26,RT

## 2020-10-07 PROCEDURE — 77063 BREAST TOMOSYNTHESIS BI: CPT | Mod: 26,59

## 2020-10-07 PROCEDURE — 77065 DX MAMMO INCL CAD UNI: CPT

## 2020-10-07 PROCEDURE — 77067 SCR MAMMO BI INCL CAD: CPT | Mod: 26,59

## 2020-10-07 PROCEDURE — 77067 SCR MAMMO BI INCL CAD: CPT

## 2020-10-07 PROCEDURE — 77080 DXA BONE DENSITY AXIAL: CPT | Mod: 26

## 2020-10-07 PROCEDURE — 77080 DXA BONE DENSITY AXIAL: CPT

## 2020-10-07 PROCEDURE — 77063 BREAST TOMOSYNTHESIS BI: CPT

## 2020-10-27 ENCOUNTER — RESULT REVIEW (OUTPATIENT)
Age: 75
End: 2020-10-27

## 2020-10-27 ENCOUNTER — OUTPATIENT (OUTPATIENT)
Dept: OUTPATIENT SERVICES | Facility: HOSPITAL | Age: 75
LOS: 1 days | End: 2020-10-27
Payer: MEDICARE

## 2020-10-27 ENCOUNTER — APPOINTMENT (OUTPATIENT)
Dept: ULTRASOUND IMAGING | Facility: CLINIC | Age: 75
End: 2020-10-27
Payer: MEDICARE

## 2020-10-27 DIAGNOSIS — Z00.8 ENCOUNTER FOR OTHER GENERAL EXAMINATION: ICD-10-CM

## 2020-10-27 DIAGNOSIS — Z90.710 ACQUIRED ABSENCE OF BOTH CERVIX AND UTERUS: Chronic | ICD-10-CM

## 2020-10-27 PROCEDURE — 77065 DX MAMMO INCL CAD UNI: CPT | Mod: 26,RT

## 2020-10-27 PROCEDURE — 77065 DX MAMMO INCL CAD UNI: CPT

## 2020-10-27 PROCEDURE — A4648: CPT

## 2020-10-27 PROCEDURE — 88305 TISSUE EXAM BY PATHOLOGIST: CPT | Mod: 26

## 2020-10-27 PROCEDURE — 88360 TUMOR IMMUNOHISTOCHEM/MANUAL: CPT | Mod: 26

## 2020-10-27 PROCEDURE — 19083 BX BREAST 1ST LESION US IMAG: CPT | Mod: RT

## 2020-10-27 PROCEDURE — 88360 TUMOR IMMUNOHISTOCHEM/MANUAL: CPT

## 2020-10-27 PROCEDURE — 88305 TISSUE EXAM BY PATHOLOGIST: CPT

## 2020-10-27 PROCEDURE — 19083 BX BREAST 1ST LESION US IMAG: CPT

## 2020-10-30 ENCOUNTER — APPOINTMENT (OUTPATIENT)
Dept: BREAST CENTER | Facility: CLINIC | Age: 75
End: 2020-10-30
Payer: MEDICARE

## 2020-10-30 VITALS
HEIGHT: 61 IN | HEART RATE: 70 BPM | BODY MASS INDEX: 25.49 KG/M2 | DIASTOLIC BLOOD PRESSURE: 69 MMHG | WEIGHT: 135 LBS | SYSTOLIC BLOOD PRESSURE: 112 MMHG

## 2020-10-30 DIAGNOSIS — Z72.3 LACK OF PHYSICAL EXERCISE: ICD-10-CM

## 2020-10-30 DIAGNOSIS — E78.00 PURE HYPERCHOLESTEROLEMIA, UNSPECIFIED: ICD-10-CM

## 2020-10-30 PROCEDURE — 99204 OFFICE O/P NEW MOD 45 MIN: CPT

## 2020-10-30 RX ORDER — METOPROLOL TARTRATE 25 MG/1
25 TABLET, FILM COATED ORAL
Refills: 0 | Status: DISCONTINUED | COMMUNITY
End: 2020-10-30

## 2020-10-30 RX ORDER — ATORVASTATIN CALCIUM 40 MG/1
40 TABLET, FILM COATED ORAL
Refills: 0 | Status: DISCONTINUED | COMMUNITY
End: 2020-10-30

## 2020-10-30 RX ORDER — ROSUVASTATIN CALCIUM 40 MG/1
40 TABLET, FILM COATED ORAL
Refills: 0 | Status: ACTIVE | COMMUNITY

## 2020-10-30 RX ORDER — CLOPIDOGREL BISULFATE 75 MG/1
75 TABLET, FILM COATED ORAL
Refills: 0 | Status: DISCONTINUED | COMMUNITY
End: 2020-10-30

## 2020-11-02 NOTE — CONSULT LETTER
[Dear  ___] : Dear ~DARYN, [Consult Letter:] : I had the pleasure of evaluating your patient, [unfilled]. [Please see my note below.] : Please see my note below. [Sincerely,] : Sincerely, [FreeTextEntry2] : Abad Farrell MD [FreeTextEntry3] : Darshana Smallwood MD FACS\par

## 2020-11-02 NOTE — HISTORY OF PRESENT ILLNESS
[FreeTextEntry1] : 75 year old female was noted to have a new area of architectural distortion in the central inferior right breast on recent mammography.  Ultrasound showed a  1.5 cm heterogeneous mass at 7:00 N6.  Ultrasound guided core biopsy revealed an invasive ductal carcinoma.   The patient presents for a breast surgical consultation.

## 2020-11-02 NOTE — DATA REVIEWED
[FreeTextEntry1] : Mammogram:   10/07/20     Findings:Architectural distortion central inferior right bresat which is new. \par 2 biopsy markers in the right breast.    \par \par Right breast ultrasound:  10/7/20  Findings:  1.5 x 1.3 x 0.8 cm heterogeneous mass at 7:00 N6 \par \par US guided core biopsy right breast (7:00 N6 winged shaped clip) : 10/27/20  PATHOLOGY:  Infiltrating ductal carcinoma, moderately differentiated. No LVI.  [place] - - 90%,  [place] -40%, HER2 - negative. \par

## 2020-11-02 NOTE — PHYSICAL EXAM
[Normocephalic] : normocephalic [Atraumatic] : atraumatic [Sclera nonicteric] : sclera nonicteric [Conjunctiva pink] : conjunctiva pink [Supple] : supple [No Supraclavicular Adenopathy] : no supraclavicular adenopathy [No Cervical Adenopathy] : no cervical adenopathy [No Thyromegaly] : no thyromegaly [Clear to Auscultation Bilat] : clear to auscultation bilaterally [Normal Sinus Rhythm] : normal sinus rhythm [No Gallops] : no gallops [No Rubs] : no pericardial rub [Examined in the supine and seated position] : examined in the supine and seated position [Symmetrical] : symmetrical [Grade 2] : Ptosis Grade 2 [No dominant masses] : no dominant masses in right breast  [No dominant masses] : no dominant masses left breast [No Nipple Retraction] : no left nipple retraction [No Nipple Discharge] : no left nipple discharge [No Axillary Lymphadenopathy] : no left axillary lymphadenopathy [Soft] : abdomen soft [No Hepato-Splenomegaly] : no hepato-splenomegaly [No Edema] : no edema [No Rashes] : no rashes [No Ulceration] : no ulceration [de-identified] : Lumpectomy scar at 1-2:00 with volume loss deformity.

## 2020-11-09 ENCOUNTER — NON-APPOINTMENT (OUTPATIENT)
Age: 75
End: 2020-11-09

## 2020-11-10 ENCOUNTER — RESULT REVIEW (OUTPATIENT)
Age: 75
End: 2020-11-10

## 2020-11-10 ENCOUNTER — OUTPATIENT (OUTPATIENT)
Dept: OUTPATIENT SERVICES | Facility: HOSPITAL | Age: 75
LOS: 1 days | End: 2020-11-10
Payer: MEDICARE

## 2020-11-10 ENCOUNTER — APPOINTMENT (OUTPATIENT)
Dept: MRI IMAGING | Facility: CLINIC | Age: 75
End: 2020-11-10
Payer: MEDICARE

## 2020-11-10 DIAGNOSIS — C50.911 MALIGNANT NEOPLASM OF UNSPECIFIED SITE OF RIGHT FEMALE BREAST: ICD-10-CM

## 2020-11-10 DIAGNOSIS — Z85.3 PERSONAL HISTORY OF MALIGNANT NEOPLASM OF BREAST: ICD-10-CM

## 2020-11-10 DIAGNOSIS — Z90.710 ACQUIRED ABSENCE OF BOTH CERVIX AND UTERUS: Chronic | ICD-10-CM

## 2020-11-10 PROCEDURE — C8908: CPT

## 2020-11-10 PROCEDURE — A9585: CPT

## 2020-11-10 PROCEDURE — 77049 MRI BREAST C-+ W/CAD BI: CPT | Mod: 26

## 2020-11-10 PROCEDURE — C8937: CPT

## 2020-11-19 ENCOUNTER — APPOINTMENT (OUTPATIENT)
Dept: ULTRASOUND IMAGING | Facility: CLINIC | Age: 75
End: 2020-11-19
Payer: MEDICARE

## 2020-11-19 ENCOUNTER — OUTPATIENT (OUTPATIENT)
Dept: OUTPATIENT SERVICES | Facility: HOSPITAL | Age: 75
LOS: 1 days | End: 2020-11-19
Payer: MEDICARE

## 2020-11-19 ENCOUNTER — RESULT REVIEW (OUTPATIENT)
Age: 75
End: 2020-11-19

## 2020-11-19 DIAGNOSIS — C50.911 MALIGNANT NEOPLASM OF UNSPECIFIED SITE OF RIGHT FEMALE BREAST: ICD-10-CM

## 2020-11-19 DIAGNOSIS — R92.8 OTHER ABNORMAL AND INCONCLUSIVE FINDINGS ON DIAGNOSTIC IMAGING OF BREAST: ICD-10-CM

## 2020-11-19 DIAGNOSIS — Z90.710 ACQUIRED ABSENCE OF BOTH CERVIX AND UTERUS: Chronic | ICD-10-CM

## 2020-11-19 PROCEDURE — 88305 TISSUE EXAM BY PATHOLOGIST: CPT | Mod: 26

## 2020-11-19 PROCEDURE — 88360 TUMOR IMMUNOHISTOCHEM/MANUAL: CPT | Mod: 26

## 2020-11-19 PROCEDURE — 77065 DX MAMMO INCL CAD UNI: CPT

## 2020-11-19 PROCEDURE — 77065 DX MAMMO INCL CAD UNI: CPT | Mod: 26,RT

## 2020-11-19 PROCEDURE — 19083 BX BREAST 1ST LESION US IMAG: CPT | Mod: RT

## 2020-11-19 PROCEDURE — 88305 TISSUE EXAM BY PATHOLOGIST: CPT

## 2020-11-19 PROCEDURE — 88342 IMHCHEM/IMCYTCHM 1ST ANTB: CPT | Mod: 26,59

## 2020-11-19 PROCEDURE — 88342 IMHCHEM/IMCYTCHM 1ST ANTB: CPT

## 2020-11-19 PROCEDURE — 88360 TUMOR IMMUNOHISTOCHEM/MANUAL: CPT

## 2020-11-19 PROCEDURE — A4648: CPT

## 2020-11-19 PROCEDURE — 19083 BX BREAST 1ST LESION US IMAG: CPT

## 2020-11-23 ENCOUNTER — APPOINTMENT (OUTPATIENT)
Dept: ULTRASOUND IMAGING | Facility: CLINIC | Age: 75
End: 2020-11-23

## 2020-11-30 ENCOUNTER — APPOINTMENT (OUTPATIENT)
Dept: BREAST CENTER | Facility: CLINIC | Age: 75
End: 2020-11-30
Payer: MEDICARE

## 2020-11-30 PROCEDURE — 99215 OFFICE O/P EST HI 40 MIN: CPT

## 2020-11-30 NOTE — PHYSICAL EXAM
[Normocephalic] : normocephalic [Atraumatic] : atraumatic [Sclera nonicteric] : sclera nonicteric [Conjunctiva pink] : conjunctiva pink [Supple] : supple [No Supraclavicular Adenopathy] : no supraclavicular adenopathy [No Cervical Adenopathy] : no cervical adenopathy [No Thyromegaly] : no thyromegaly [Clear to Auscultation Bilat] : clear to auscultation bilaterally [Normal Sinus Rhythm] : normal sinus rhythm [No Gallops] : no gallops [No Rubs] : no pericardial rub [Examined in the supine and seated position] : examined in the supine and seated position [Symmetrical] : symmetrical [Grade 2] : Ptosis Grade 2 [No dominant masses] : no dominant masses in right breast  [No dominant masses] : no dominant masses left breast [No Nipple Retraction] : no left nipple retraction [No Nipple Discharge] : no left nipple discharge [No Axillary Lymphadenopathy] : no left axillary lymphadenopathy [Soft] : abdomen soft [No Hepato-Splenomegaly] : no hepato-splenomegaly [No Edema] : no edema [No Rashes] : no rashes [No Ulceration] : no ulceration [Bra Size: ___] : Bra Size: [unfilled] [de-identified] : Lumpectomy scar at 1-2:00 with volume loss deformity.

## 2020-11-30 NOTE — CONSULT LETTER
[Dear  ___] : Dear ~DARYN, [Courtesy Letter:] : I had the pleasure of seeing your patient, [unfilled], in my office today. [Please see my note below.] : Please see my note below. [Consult Closing:] : Thank you very much for allowing me to participate in the care of this patient.  If you have any questions, please do not hesitate to contact me. [Sincerely,] : Sincerely, [DrBarb  ___] : Dr. BRUNNER [FreeTextEntry2] : Abad Farrell MD [FreeTextEntry3] : Darshana Smallwood MD FACS\par

## 2020-11-30 NOTE — DATA REVIEWED
[FreeTextEntry1] : Mammogram:   10/07/20     Findings:Architectural distortion central inferior right bresat which is new. \par 2 biopsy markers in the right breast.    \par \par Right breast ultrasound:  10/7/20  Findings:  1.5 x 1.3 x 0.8 cm heterogeneous mass at 7:00 N6 \par \par US guided core biopsy right breast (7:00 N6 winged shaped clip) : 10/27/20  PATHOLOGY:  Infiltrating ductal carcinoma, moderately differentiated. No LVI.  ER - - 90%,  [place] -40%, HER2 - negative. \par \par Breast MRI:  11/10/2020    Findings:  There are scattered enhancing nonspecific foci. There is an irregular enhancing mass in the lower outer right breast ( Seq 17047, Im 44 ) posteriorly measuring 2.1 x 1.6 x 2.0 cm which correlates to the newly diagnosed infiltrating ductal cancer. There is a focal nonmass enhancement in the lower central right breast measuring approximately 2 cm and located approximately 2 cm anterior to the known cancer ( Seq 37477, Im 53 ) for which further evaluation with targeted ultrasound is recommended and ultrasound-guided core biopsy for correlation is identified. If no correlate is seen, then MRI guided core biopsy is recommended. The right nipple is rotated far medially and there is underlying enhancement in the nipple areolar complex complex. This may be artifactual; however, further evaluation with targeted ultrasound is recommended to exclude underlying mass. There is a right 2- 3 mm focus of enhancement in the upper slightly outer right breast ( Seq 36765, Im 91) for which MRI guided core biopsy is recommended.\par \par LEFT BREAST:\par There are scattered enhancing nonspecific foci. There are post lumpectomy changes in the left breast far posteriorly where susceptibility artifact from clips at the lumpectomy site are noted. There is no suspicious enhancement in the left breast.\par \par \par IMPRESSION:\par \par Irregular enhancing mass in the lower outer right breast posteriorly consistent with the known newly diagnosed infiltrating cancer.\par \par 2 cm area of nonmass enhancement in the lower central right breast for which further evaluation with targeted ultrasound is recommended. If a sonographic correlate is identified if correlates are identified., ultrasound-guided core biopsy is recommended. If no correlate is identified, then further evaluation with MRI core biopsy is recommended.\par \par There is enhancement in the right nipple areolar complex which may be artifactual. However, further evaluation with targeted ultrasound to exclude an underlying lesion is recommended. If an underlying lesion is identified, then ultrasound-guided core biopsy may be performed.\par \par 2-3 mm focus of enhancement in the upper slightly outer right breast for which MRI guided core biopsy is recommended.  RECOMMENDATION:  MR guided biopsy.\par \par As above, ultrasound-guided core biopsies in the right breast are also recommended if correlates are identified.\par \par \par Targeted right 11/19/20   Findings:  At 7:00 N4 right breast there is a 15 x 9 x 6 mm focal irregular mass which c/w with region of nonmass enhancement in the lower central right breast.  There is no sonographic abnormality at the right nipple areola region.   \par \par Ultrasound guided core biopsy Right breast 7:00 N4: 11/19/20   Pathology: Infiltrating Ductal carcinoma. ( 6/9). ER - 95%, [place] - 60%, HER2 - negative. \par \par \par \par \par \par \par \par    \par

## 2020-11-30 NOTE — HISTORY OF PRESENT ILLNESS
[FreeTextEntry1] : 75 year old female  with a history of DCIS of the left breast was recently diagnosed with an infiltrating ductal carcinoma of the right breast had a recent breast MRI which showed 2 additional lesions in the right breast.  The patient had a second look ultrasound which showed a 15 x 9 x 6 mm irregular mass which was thought to correspond to the second lesion in the central right breast identified on MRI.  The patient underwent an ultrasound guided core biopsy of the lesion which proved to be a second site of invasive cancer.  A third small lesion was found in the UOQ of the right breast on MRI.  This lesion has not been biopsied.  The patient is here to review the pathology and imaging results as well as create a surgical plan.

## 2020-12-10 ENCOUNTER — APPOINTMENT (OUTPATIENT)
Dept: PLASTIC SURGERY | Facility: CLINIC | Age: 75
End: 2020-12-10
Payer: MEDICARE

## 2020-12-10 VITALS — WEIGHT: 135 LBS | HEIGHT: 61 IN | BODY MASS INDEX: 25.49 KG/M2

## 2020-12-10 PROCEDURE — 99201 OFFICE OUTPATIENT NEW 10 MINUTES: CPT

## 2020-12-22 ENCOUNTER — APPOINTMENT (OUTPATIENT)
Dept: BREAST CENTER | Facility: CLINIC | Age: 75
End: 2020-12-22
Payer: MEDICARE

## 2020-12-22 PROCEDURE — 99215 OFFICE O/P EST HI 40 MIN: CPT

## 2020-12-22 NOTE — DATA REVIEWED
[FreeTextEntry1] : Mammogram:   10/07/20     Findings:Architectural distortion central inferior right bresat which is new. \par 2 biopsy markers in the right breast.    \par \par Right breast ultrasound:  10/7/20  Findings:  1.5 x 1.3 x 0.8 cm heterogeneous mass at 7:00 N6 \par \par US guided core biopsy right breast (7:00 N6 winged shaped clip) : 10/27/20  PATHOLOGY:  Infiltrating ductal carcinoma, moderately differentiated. No LVI.  ER - - 90%,  HI -40%, HER2 - negative. \par \par Breast MRI:  11/10/2020    Findings:  There are scattered enhancing nonspecific foci. There is an irregular enhancing mass in the lower outer right breast ( Seq 51006, Im 44 ) posteriorly measuring 2.1 x 1.6 x 2.0 cm which correlates to the newly diagnosed infiltrating ductal cancer. There is a focal nonmass enhancement in the lower central right breast measuring approximately 2 cm and located approximately 2 cm anterior to the known cancer ( Seq 09575, Im 53 ) for which further evaluation with targeted ultrasound is recommended and ultrasound-guided core biopsy for correlation is identified. If no correlate is seen, then MRI guided core biopsy is recommended. The right nipple is rotated far medially and there is underlying enhancement in the nipple areolar complex complex. This may be artifactual; however, further evaluation with targeted ultrasound is recommended to exclude underlying mass. There is a right 2- 3 mm focus of enhancement in the upper slightly outer right breast ( Seq 64658, Im 91) for which MRI guided core biopsy is recommended.\par \par LEFT BREAST:\par There are scattered enhancing nonspecific foci. There are post lumpectomy changes in the left breast far posteriorly where susceptibility artifact from clips at the lumpectomy site are noted. There is no suspicious enhancement in the left breast.\par \par \par IMPRESSION:\par \par Irregular enhancing mass in the lower outer right breast posteriorly consistent with the known newly diagnosed infiltrating cancer.\par \par 2 cm area of nonmass enhancement in the lower central right breast for which further evaluation with targeted ultrasound is recommended. If a sonographic correlate is identified if correlates are identified., ultrasound-guided core biopsy is recommended. If no correlate is identified, then further evaluation with MRI core biopsy is recommended.\par \par There is enhancement in the right nipple areolar complex which may be artifactual. However, further evaluation with targeted ultrasound to exclude an underlying lesion is recommended. If an underlying lesion is identified, then ultrasound-guided core biopsy may be performed.\par \par 2-3 mm focus of enhancement in the upper slightly outer right breast for which MRI guided core biopsy is recommended.  RECOMMENDATION:  MR guided biopsy.\par \par As above, ultrasound-guided core biopsies in the right breast are also recommended if correlates are identified.\par \par \par Targeted right 11/19/20   Findings:  At 7:00 N4 right breast there is a 15 x 9 x 6 mm focal irregular mass which c/w with region of nonmass enhancement in the lower central right breast.  There is no sonographic abnormality at the right nipple areola region.   \par \par Ultrasound guided core biopsy Right breast 7:00 N4: 11/19/20   Pathology: Infiltrating Ductal carcinoma. ( 6/9). ER - 95%, HI - 60%, HER2 - negative. \par \par \par \par \par \par \par \par    \par

## 2020-12-22 NOTE — PHYSICAL EXAM
[Normocephalic] : normocephalic [Atraumatic] : atraumatic [Sclera nonicteric] : sclera nonicteric [Conjunctiva pink] : conjunctiva pink [Supple] : supple [No Supraclavicular Adenopathy] : no supraclavicular adenopathy [No Cervical Adenopathy] : no cervical adenopathy [No Thyromegaly] : no thyromegaly [Clear to Auscultation Bilat] : clear to auscultation bilaterally [Normal Sinus Rhythm] : normal sinus rhythm [No Gallops] : no gallops [No Rubs] : no pericardial rub [Examined in the supine and seated position] : examined in the supine and seated position [Symmetrical] : symmetrical [Bra Size: ___] : Bra Size: [unfilled] [Grade 2] : Ptosis Grade 2 [No dominant masses] : no dominant masses in right breast  [No dominant masses] : no dominant masses left breast [No Nipple Retraction] : no left nipple retraction [No Nipple Discharge] : no left nipple discharge [No Axillary Lymphadenopathy] : no left axillary lymphadenopathy [Soft] : abdomen soft [No Hepato-Splenomegaly] : no hepato-splenomegaly [No Edema] : no edema [No Rashes] : no rashes [No Ulceration] : no ulceration [de-identified] : Lumpectomy scar at 1-2:00 with volume loss deformity.

## 2020-12-22 NOTE — HISTORY OF PRESENT ILLNESS
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  She saw Dr. Rueda for a reconstructive consultation and has decided to proceed with a right mastectomy with immediate tissue expander reconstruction as well as sentinel node biopsy.    She is here to discuss the details of her upcoming surgery scheduled for 1/6/21.

## 2020-12-22 NOTE — CONSULT LETTER
[Dear  ___] : Dear ~DARYN, [Courtesy Letter:] : I had the pleasure of seeing your patient, [unfilled], in my office today. [Please see my note below.] : Please see my note below. [Sincerely,] : Sincerely, [FreeTextEntry2] : Abad Farrell MD [FreeTextEntry3] : Darshana Smallwood MD FACS\par  [DrBarb  ___] : Dr. BRUNNER

## 2020-12-31 ENCOUNTER — RESULT REVIEW (OUTPATIENT)
Age: 75
End: 2020-12-31

## 2020-12-31 ENCOUNTER — OUTPATIENT (OUTPATIENT)
Dept: OUTPATIENT SERVICES | Facility: HOSPITAL | Age: 75
LOS: 1 days | End: 2020-12-31
Payer: MEDICARE

## 2020-12-31 VITALS
TEMPERATURE: 99 F | SYSTOLIC BLOOD PRESSURE: 138 MMHG | OXYGEN SATURATION: 98 % | RESPIRATION RATE: 18 BRPM | DIASTOLIC BLOOD PRESSURE: 82 MMHG | WEIGHT: 134.48 LBS | HEIGHT: 61 IN | HEART RATE: 62 BPM

## 2020-12-31 DIAGNOSIS — Z29.9 ENCOUNTER FOR PROPHYLACTIC MEASURES, UNSPECIFIED: ICD-10-CM

## 2020-12-31 DIAGNOSIS — Z98.890 OTHER SPECIFIED POSTPROCEDURAL STATES: Chronic | ICD-10-CM

## 2020-12-31 DIAGNOSIS — C50.911 MALIGNANT NEOPLASM OF UNSPECIFIED SITE OF RIGHT FEMALE BREAST: ICD-10-CM

## 2020-12-31 DIAGNOSIS — Z01.818 ENCOUNTER FOR OTHER PREPROCEDURAL EXAMINATION: ICD-10-CM

## 2020-12-31 DIAGNOSIS — Z90.710 ACQUIRED ABSENCE OF BOTH CERVIX AND UTERUS: Chronic | ICD-10-CM

## 2020-12-31 DIAGNOSIS — Z95.5 PRESENCE OF CORONARY ANGIOPLASTY IMPLANT AND GRAFT: Chronic | ICD-10-CM

## 2020-12-31 LAB
ALBUMIN SERPL ELPH-MCNC: 4.3 G/DL — SIGNIFICANT CHANGE UP (ref 3.3–5)
ALP SERPL-CCNC: 100 U/L — SIGNIFICANT CHANGE UP (ref 40–120)
ALT FLD-CCNC: 21 U/L — SIGNIFICANT CHANGE UP (ref 12–78)
ANION GAP SERPL CALC-SCNC: 3 MMOL/L — LOW (ref 5–17)
APPEARANCE UR: CLEAR — SIGNIFICANT CHANGE UP
APTT BLD: 29.1 SEC — SIGNIFICANT CHANGE UP (ref 27.5–35.5)
AST SERPL-CCNC: 16 U/L — SIGNIFICANT CHANGE UP (ref 15–37)
BASOPHILS # BLD AUTO: 0.04 K/UL — SIGNIFICANT CHANGE UP (ref 0–0.2)
BASOPHILS NFR BLD AUTO: 0.5 % — SIGNIFICANT CHANGE UP (ref 0–2)
BILIRUB SERPL-MCNC: 0.5 MG/DL — SIGNIFICANT CHANGE UP (ref 0.2–1.2)
BILIRUB UR-MCNC: NEGATIVE — SIGNIFICANT CHANGE UP
BUN SERPL-MCNC: 19 MG/DL — SIGNIFICANT CHANGE UP (ref 7–23)
CALCIUM SERPL-MCNC: 9.8 MG/DL — SIGNIFICANT CHANGE UP (ref 8.5–10.1)
CHLORIDE SERPL-SCNC: 106 MMOL/L — SIGNIFICANT CHANGE UP (ref 96–108)
CO2 SERPL-SCNC: 30 MMOL/L — SIGNIFICANT CHANGE UP (ref 22–31)
COLOR SPEC: YELLOW — SIGNIFICANT CHANGE UP
CREAT SERPL-MCNC: 1.03 MG/DL — SIGNIFICANT CHANGE UP (ref 0.5–1.3)
DIFF PNL FLD: ABNORMAL
EOSINOPHIL # BLD AUTO: 0.09 K/UL — SIGNIFICANT CHANGE UP (ref 0–0.5)
EOSINOPHIL NFR BLD AUTO: 1.2 % — SIGNIFICANT CHANGE UP (ref 0–6)
GLUCOSE SERPL-MCNC: 94 MG/DL — SIGNIFICANT CHANGE UP (ref 70–99)
GLUCOSE UR QL: NEGATIVE MG/DL — SIGNIFICANT CHANGE UP
HCT VFR BLD CALC: 40.2 % — SIGNIFICANT CHANGE UP (ref 34.5–45)
HGB BLD-MCNC: 13.2 G/DL — SIGNIFICANT CHANGE UP (ref 11.5–15.5)
IMM GRANULOCYTES NFR BLD AUTO: 0.3 % — SIGNIFICANT CHANGE UP (ref 0–1.5)
INR BLD: 0.96 RATIO — SIGNIFICANT CHANGE UP (ref 0.88–1.16)
KETONES UR-MCNC: NEGATIVE — SIGNIFICANT CHANGE UP
LEUKOCYTE ESTERASE UR-ACNC: ABNORMAL
LYMPHOCYTES # BLD AUTO: 1.7 K/UL — SIGNIFICANT CHANGE UP (ref 1–3.3)
LYMPHOCYTES # BLD AUTO: 23.2 % — SIGNIFICANT CHANGE UP (ref 13–44)
MCHC RBC-ENTMCNC: 31.7 PG — SIGNIFICANT CHANGE UP (ref 27–34)
MCHC RBC-ENTMCNC: 32.8 GM/DL — SIGNIFICANT CHANGE UP (ref 32–36)
MCV RBC AUTO: 96.4 FL — SIGNIFICANT CHANGE UP (ref 80–100)
MONOCYTES # BLD AUTO: 0.52 K/UL — SIGNIFICANT CHANGE UP (ref 0–0.9)
MONOCYTES NFR BLD AUTO: 7.1 % — SIGNIFICANT CHANGE UP (ref 2–14)
NEUTROPHILS # BLD AUTO: 4.95 K/UL — SIGNIFICANT CHANGE UP (ref 1.8–7.4)
NEUTROPHILS NFR BLD AUTO: 67.7 % — SIGNIFICANT CHANGE UP (ref 43–77)
NITRITE UR-MCNC: NEGATIVE — SIGNIFICANT CHANGE UP
PH UR: 6.5 — SIGNIFICANT CHANGE UP (ref 5–8)
PLATELET # BLD AUTO: 328 K/UL — SIGNIFICANT CHANGE UP (ref 150–400)
POTASSIUM SERPL-MCNC: 4.1 MMOL/L — SIGNIFICANT CHANGE UP (ref 3.5–5.3)
POTASSIUM SERPL-SCNC: 4.1 MMOL/L — SIGNIFICANT CHANGE UP (ref 3.5–5.3)
PROT SERPL-MCNC: 7.5 GM/DL — SIGNIFICANT CHANGE UP (ref 6–8.3)
PROT UR-MCNC: NEGATIVE MG/DL — SIGNIFICANT CHANGE UP
PROTHROM AB SERPL-ACNC: 11.2 SEC — SIGNIFICANT CHANGE UP (ref 10.6–13.6)
RBC # BLD: 4.17 M/UL — SIGNIFICANT CHANGE UP (ref 3.8–5.2)
RBC # FLD: 11.9 % — SIGNIFICANT CHANGE UP (ref 10.3–14.5)
SODIUM SERPL-SCNC: 139 MMOL/L — SIGNIFICANT CHANGE UP (ref 135–145)
SP GR SPEC: 1.01 — SIGNIFICANT CHANGE UP (ref 1.01–1.02)
UROBILINOGEN FLD QL: NEGATIVE MG/DL — SIGNIFICANT CHANGE UP
WBC # BLD: 7.32 K/UL — SIGNIFICANT CHANGE UP (ref 3.8–10.5)
WBC # FLD AUTO: 7.32 K/UL — SIGNIFICANT CHANGE UP (ref 3.8–10.5)

## 2020-12-31 PROCEDURE — 93010 ELECTROCARDIOGRAM REPORT: CPT

## 2020-12-31 PROCEDURE — 36415 COLL VENOUS BLD VENIPUNCTURE: CPT

## 2020-12-31 PROCEDURE — 85610 PROTHROMBIN TIME: CPT

## 2020-12-31 PROCEDURE — G0463: CPT | Mod: 25

## 2020-12-31 PROCEDURE — 81001 URINALYSIS AUTO W/SCOPE: CPT

## 2020-12-31 PROCEDURE — 71046 X-RAY EXAM CHEST 2 VIEWS: CPT | Mod: 26

## 2020-12-31 PROCEDURE — 86901 BLOOD TYPING SEROLOGIC RH(D): CPT

## 2020-12-31 PROCEDURE — 80053 COMPREHEN METABOLIC PANEL: CPT

## 2020-12-31 PROCEDURE — 93005 ELECTROCARDIOGRAM TRACING: CPT

## 2020-12-31 PROCEDURE — 85025 COMPLETE CBC W/AUTO DIFF WBC: CPT

## 2020-12-31 PROCEDURE — 86900 BLOOD TYPING SEROLOGIC ABO: CPT

## 2020-12-31 PROCEDURE — 71046 X-RAY EXAM CHEST 2 VIEWS: CPT

## 2020-12-31 PROCEDURE — 86850 RBC ANTIBODY SCREEN: CPT

## 2020-12-31 PROCEDURE — 85730 THROMBOPLASTIN TIME PARTIAL: CPT

## 2020-12-31 RX ORDER — DESVENLAFAXINE 50 MG/1
1 TABLET, EXTENDED RELEASE ORAL
Qty: 0 | Refills: 0 | DISCHARGE

## 2020-12-31 RX ORDER — TRAZODONE HCL 50 MG
200 TABLET ORAL
Qty: 0 | Refills: 0 | DISCHARGE

## 2020-12-31 NOTE — H&P PST ADULT - NSICDXPASTMEDICALHX_GEN_ALL_CORE_FT
PAST MEDICAL HISTORY:  Breast cancer left breast- lumpectomy and radiation - CURRENTLY RIGHT BREAST IDC    Depression     Hiatal hernia     History of coronary artery disease     Lumbar radiculopathy     Lumbar spondylosis

## 2020-12-31 NOTE — H&P PST ADULT - NSANTHOSAYNRD_GEN_A_CORE
No. DANICA screening performed.  STOP BANG Legend: 0-2 = LOW Risk; 3-4 = INTERMEDIATE Risk; 5-8 = HIGH Risk

## 2020-12-31 NOTE — H&P PST ADULT - HISTORY OF PRESENT ILLNESS
75 year old woman with history of CAD stent x1,  presents to [facility] for preprocedure exam. Patient is for planned right breast mastectomy sentinel node biopsy with possible axillary node dissection and right breast reconstruction with tissue expanders with Dr Smallwood and Dr Rueda. Patient recently diagnosed with Right breast IDC. No acute complaints. She has a personal history of left breast cancer and has lumpectomy with radiation in the past.

## 2020-12-31 NOTE — H&P PST ADULT - ASSESSMENT
75 year old woman with history of CAD stent x1,  presents to PST for preprocedure exam. Patient is for planned right breast mastectomy sentinel node biopsy with possible axillary node dissection and right breast reconstruction with tissue expanders with Dr Smallwood and Dr Rueda. Patient recently diagnosed with Right breast IDC. No acute complaints. She has a personal history of left breast cancer and has lumpectomy with radiation in the past.       Plan:  - PST instructions given ; NPO status instructions to be given by ASU .  - Pt instructed to take following meds with sip of water : am meds as per usual   - Pt instructed to take routine evening medications unless indicated .  -  Stop NSAIDS ( Aspirin Alev Motrin Mobic Diclofenac), herbal supplements , MVI , Vitamin fish oil 7 days prior to surgery  unless   directed by surgeon or cardiologist;   - Medical Optimization  with Dr Vidales  - EZ wash instructions given & mupirocin instructions given.  - Labs EKG CXR as per surgeon request.   -  Pt instructed to self quarantine .  - Covid Testing scheduled on ____1/3_____.  Pt notified and aware.  - Pt denies covid symptoms shortness of breath fever cough .      CAPRINI SCORE [CLOT]    AGE RELATED RISK FACTORS                                                       MOBILITY RELATED FACTORS  [ ] Age 41-60 years                                            (1 Point)                  [ ] Bed rest                                                        (1 Point)  [x ] Age: 61-74 years                                           (2 Points)                 [ ] Plaster cast                                                   (2 Points)  [ ] Age= 75 years                                              (3 Points)                 [ ] Bed bound for more than 72 hours                 (2 Points)    DISEASE RELATED RISK FACTORS                                               GENDER SPECIFIC FACTORS  [ ] Edema in the lower extremities                       (1 Point)                  [ ] Pregnancy                                                     (1 Point)  [ ] Varicose veins                                               (1 Point)                  [ ] Post-partum < 6 weeks                                   (1 Point)             [ ] BMI > 25 Kg/m2                                            (1 Point)                  [ ] Hormonal therapy  or oral contraception          (1 Point)                 [ ] Sepsis (in the previous month)                        (1 Point)                  [ ] History of pregnancy complications                 (1 point)  [ ] Pneumonia or serious lung disease                                               [ ] Unexplained or recurrent                     (1 Point)           (in the previous month)                               (1 Point)  [ ] Abnormal pulmonary function test                     (1 Point)                 SURGERY RELATED RISK FACTORS  [ ] Acute myocardial infarction                              (1 Point)                 [ ]  Section                                             (1 Point)  [ ] Congestive heart failure (in the previous month)  (1 Point)               [ ] Minor surgery                                                  (1 Point)   [ ] Inflammatory bowel disease                             (1 Point)                 [ ] Arthroscopic surgery                                        (2 Points)  [ ] Central venous access                                      (2 Points)                [x ] General surgery lasting more than 45 minutes   (2 Points)       [ ] Stroke (in the previous month)                          (5 Points)               [ ] Elective arthroplasty                                         (5 Points)                                                                                                                                               HEMATOLOGY RELATED FACTORS                                                 TRAUMA RELATED RISK FACTORS  [ ] Prior episodes of VTE                                     (3 Points)                [ ] Fracture of the hip, pelvis, or leg                       (5 Points)  [ ] Positive family history for VTE                         (3 Points)                 [ ] Acute spinal cord injury (in the previous month)  (5 Points)  [ ] Prothrombin 04769 A                                     (3 Points)                 [ ] Paralysis  (less than 1 month)                             (5 Points)  [ ] Factor V Leiden                                             (3 Points)                  [ ] Multiple Trauma within 1 month                        (5 Points)  [ ] Lupus anticoagulants                                     (3 Points)                                                           [ ] Anticardiolipin antibodies                               (3 Points)                                                       [ ] High homocysteine in the blood                      (3 Points)                                             [ ] Other congenital or acquired thrombophilia      (3 Points)                                                [ ] Heparin induced thrombocytopenia                  (3 Points)                                          Total Score [     4     ]    Caprini Score 0 - 2:  Low Risk, No VTE Prophylaxis required for most patients, encourage ambulation  Caprini Score 3 - 6:  At Risk, pharmacologic VTE prophylaxis is indicated for most patients (in the absence of a contraindication)  Caprini Score Greater than or = 7:  High Risk, pharmacologic VTE prophylaxis is indicated for most patients (in the absence of a contraindication)

## 2020-12-31 NOTE — H&P PST ADULT - HEALTH CARE MAINTENANCE
flu vaccine current      Denies travel outside of state or country in the last 14 days.   Denies contact with known Coronavirus positive person.  Denies fever, chills, cough, congestion, runny nose, SOB or difficulty breathing, fatigue, muscle or body ache, headache. loss of taste or smell, N/V/D.

## 2021-01-01 DIAGNOSIS — Z01.818 ENCOUNTER FOR OTHER PREPROCEDURAL EXAMINATION: ICD-10-CM

## 2021-01-01 DIAGNOSIS — C50.911 MALIGNANT NEOPLASM OF UNSPECIFIED SITE OF RIGHT FEMALE BREAST: ICD-10-CM

## 2021-01-03 ENCOUNTER — APPOINTMENT (OUTPATIENT)
Dept: DISASTER EMERGENCY | Facility: CLINIC | Age: 76
End: 2021-01-03

## 2021-01-05 RX ORDER — SODIUM CHLORIDE 9 MG/ML
3 INJECTION INTRAMUSCULAR; INTRAVENOUS; SUBCUTANEOUS EVERY 8 HOURS
Refills: 0 | Status: DISCONTINUED | OUTPATIENT
Start: 2021-01-06 | End: 2021-01-07

## 2021-01-05 NOTE — DATA REVIEWED
[FreeTextEntry1] : Mammogram:   10/07/20     Findings:Architectural distortion central inferior right bresat which is new. \par 2 biopsy markers in the right breast.    \par \par Right breast ultrasound:  10/7/20  Findings:  1.5 x 1.3 x 0.8 cm heterogeneous mass at 7:00 N6 \par \par US guided core biopsy right breast (7:00 N6 winged shaped clip) : 10/27/20  PATHOLOGY:  Infiltrating ductal carcinoma, moderately differentiated. No LVI.  ER - - 90%,  CA -40%, HER2 - negative. \par \par Breast MRI:  11/10/2020    Findings:  There are scattered enhancing nonspecific foci. There is an irregular enhancing mass in the lower outer right breast ( Seq 07192, Im 44 ) posteriorly measuring 2.1 x 1.6 x 2.0 cm which correlates to the newly diagnosed infiltrating ductal cancer. There is a focal nonmass enhancement in the lower central right breast measuring approximately 2 cm and located approximately 2 cm anterior to the known cancer ( Seq 67946, Im 53 ) for which further evaluation with targeted ultrasound is recommended and ultrasound-guided core biopsy for correlation is identified. If no correlate is seen, then MRI guided core biopsy is recommended. The right nipple is rotated far medially and there is underlying enhancement in the nipple areolar complex complex. This may be artifactual; however, further evaluation with targeted ultrasound is recommended to exclude underlying mass. There is a right 2- 3 mm focus of enhancement in the upper slightly outer right breast ( Seq 47484, Im 91) for which MRI guided core biopsy is recommended.\par \par LEFT BREAST:\par There are scattered enhancing nonspecific foci. There are post lumpectomy changes in the left breast far posteriorly where susceptibility artifact from clips at the lumpectomy site are noted. There is no suspicious enhancement in the left breast.\par \par \par IMPRESSION:\par \par Irregular enhancing mass in the lower outer right breast posteriorly consistent with the known newly diagnosed infiltrating cancer.\par \par 2 cm area of nonmass enhancement in the lower central right breast for which further evaluation with targeted ultrasound is recommended. If a sonographic correlate is identified if correlates are identified., ultrasound-guided core biopsy is recommended. If no correlate is identified, then further evaluation with MRI core biopsy is recommended.\par \par There is enhancement in the right nipple areolar complex which may be artifactual. However, further evaluation with targeted ultrasound to exclude an underlying lesion is recommended. If an underlying lesion is identified, then ultrasound-guided core biopsy may be performed.\par \par 2-3 mm focus of enhancement in the upper slightly outer right breast for which MRI guided core biopsy is recommended.  RECOMMENDATION:  MR guided biopsy.\par \par As above, ultrasound-guided core biopsies in the right breast are also recommended if correlates are identified.\par \par \par Targeted right 11/19/20   Findings:  At 7:00 N4 right breast there is a 15 x 9 x 6 mm focal irregular mass which c/w with region of nonmass enhancement in the lower central right breast.  There is no sonographic abnormality at the right nipple areola region.   \par \par Ultrasound guided core biopsy Right breast 7:00 N4: 11/19/20   Pathology: Infiltrating Ductal carcinoma. ( 6/9). ER - 95%, CA - 60%, HER2 - negative. \par \par \par \par \par \par \par \par    \par

## 2021-01-05 NOTE — CONSULT LETTER
[Dear  ___] : Dear ~DARYN, [Courtesy Letter:] : I had the pleasure of seeing your patient, [unfilled], in my office today. [Please see my note below.] : Please see my note below. [Sincerely,] : Sincerely, [DrBarb  ___] : Dr. BRUNNER [FreeTextEntry2] : Abad Farrell MD [FreeTextEntry3] : Darshana Smallwood MD FACS\par

## 2021-01-05 NOTE — PHYSICAL EXAM
[Normocephalic] : normocephalic [Atraumatic] : atraumatic [Sclera nonicteric] : sclera nonicteric [Conjunctiva pink] : conjunctiva pink [Supple] : supple [No Supraclavicular Adenopathy] : no supraclavicular adenopathy [No Cervical Adenopathy] : no cervical adenopathy [No Thyromegaly] : no thyromegaly [Clear to Auscultation Bilat] : clear to auscultation bilaterally [Normal Sinus Rhythm] : normal sinus rhythm [No Gallops] : no gallops [No Rubs] : no pericardial rub [Examined in the supine and seated position] : examined in the supine and seated position [Symmetrical] : symmetrical [Bra Size: ___] : Bra Size: [unfilled] [Grade 2] : Ptosis Grade 2 [No dominant masses] : no dominant masses in right breast  [No dominant masses] : no dominant masses left breast [No Nipple Retraction] : no left nipple retraction [No Nipple Discharge] : no left nipple discharge [No Axillary Lymphadenopathy] : no left axillary lymphadenopathy [Soft] : abdomen soft [No Hepato-Splenomegaly] : no hepato-splenomegaly [No Edema] : no edema [No Rashes] : no rashes [No Ulceration] : no ulceration [de-identified] : Lumpectomy scar at 1-2:00 with volume loss deformity.

## 2021-01-05 NOTE — HISTORY OF PRESENT ILLNESS
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  She saw Dr. Rueda for a reconstructive consultation and has decided to proceed with a right mastectomy with immediate tissue expander reconstruction as well as sentinel node biopsy.    She is  scheduled for  surgery at [facility] on 1/6/21.

## 2021-01-06 ENCOUNTER — APPOINTMENT (OUTPATIENT)
Dept: BREAST CENTER | Facility: HOSPITAL | Age: 76
End: 2021-01-06

## 2021-01-06 ENCOUNTER — NON-APPOINTMENT (OUTPATIENT)
Age: 76
End: 2021-01-06

## 2021-01-06 ENCOUNTER — INPATIENT (INPATIENT)
Facility: HOSPITAL | Age: 76
LOS: 0 days | Discharge: HOME HEALTH | DRG: 582 | End: 2021-01-07
Attending: PLASTIC SURGERY | Admitting: SURGERY
Payer: MEDICARE

## 2021-01-06 ENCOUNTER — RESULT REVIEW (OUTPATIENT)
Age: 76
End: 2021-01-06

## 2021-01-06 ENCOUNTER — APPOINTMENT (OUTPATIENT)
Dept: PLASTIC SURGERY | Facility: HOSPITAL | Age: 76
End: 2021-01-06
Payer: MEDICARE

## 2021-01-06 VITALS
SYSTOLIC BLOOD PRESSURE: 140 MMHG | RESPIRATION RATE: 16 BRPM | DIASTOLIC BLOOD PRESSURE: 80 MMHG | WEIGHT: 134.92 LBS | OXYGEN SATURATION: 98 % | TEMPERATURE: 98 F | HEART RATE: 66 BPM | HEIGHT: 61 IN

## 2021-01-06 DIAGNOSIS — Z95.5 PRESENCE OF CORONARY ANGIOPLASTY IMPLANT AND GRAFT: Chronic | ICD-10-CM

## 2021-01-06 DIAGNOSIS — C50.911 MALIGNANT NEOPLASM OF UNSPECIFIED SITE OF RIGHT FEMALE BREAST: ICD-10-CM

## 2021-01-06 DIAGNOSIS — Z90.710 ACQUIRED ABSENCE OF BOTH CERVIX AND UTERUS: Chronic | ICD-10-CM

## 2021-01-06 DIAGNOSIS — Z90.11 ACQUIRED ABSENCE OF RIGHT BREAST AND NIPPLE: ICD-10-CM

## 2021-01-06 DIAGNOSIS — Z98.890 OTHER SPECIFIED POSTPROCEDURAL STATES: Chronic | ICD-10-CM

## 2021-01-06 PROCEDURE — 86891 AUTOLOGOUS BLOOD OP SALVAGE: CPT

## 2021-01-06 PROCEDURE — C1789: CPT

## 2021-01-06 PROCEDURE — 88307 TISSUE EXAM BY PATHOLOGIST: CPT

## 2021-01-06 PROCEDURE — 19303 MAST SIMPLE COMPLETE: CPT

## 2021-01-06 PROCEDURE — 15777 ACELLULAR DERM MATRIX IMPLT: CPT | Mod: RT

## 2021-01-06 PROCEDURE — 99261: CPT

## 2021-01-06 PROCEDURE — 88333 PATH CONSLTJ SURG CYTO XM 1: CPT

## 2021-01-06 PROCEDURE — 19303 MAST SIMPLE COMPLETE: CPT | Mod: AS,RT

## 2021-01-06 PROCEDURE — 88305 TISSUE EXAM BY PATHOLOGIST: CPT

## 2021-01-06 PROCEDURE — 88307 TISSUE EXAM BY PATHOLOGIST: CPT | Mod: 26

## 2021-01-06 PROCEDURE — 88334 PATH CONSLTJ SURG CYTO XM EA: CPT | Mod: 26,59

## 2021-01-06 PROCEDURE — 19357 TISS XPNDR PLMT BRST RCNSTJ: CPT | Mod: RT

## 2021-01-06 PROCEDURE — 88305 TISSUE EXAM BY PATHOLOGIST: CPT | Mod: 26

## 2021-01-06 PROCEDURE — 88331 PATH CONSLTJ SURG 1 BLK 1SPC: CPT | Mod: 26

## 2021-01-06 PROCEDURE — 38745 REMOVE ARMPIT LYMPH NODES: CPT | Mod: 59

## 2021-01-06 PROCEDURE — C9290: CPT

## 2021-01-06 PROCEDURE — 38792 RA TRACER ID OF SENTINL NODE: CPT

## 2021-01-06 PROCEDURE — 88331 PATH CONSLTJ SURG 1 BLK 1SPC: CPT

## 2021-01-06 PROCEDURE — C1889: CPT

## 2021-01-06 PROCEDURE — C1713: CPT

## 2021-01-06 RX ORDER — DOCUSATE SODIUM 100 MG
2 CAPSULE ORAL
Qty: 0 | Refills: 0 | DISCHARGE

## 2021-01-06 RX ORDER — SODIUM CHLORIDE 9 MG/ML
1000 INJECTION, SOLUTION INTRAVENOUS
Refills: 0 | Status: DISCONTINUED | OUTPATIENT
Start: 2021-01-06 | End: 2021-01-06

## 2021-01-06 RX ORDER — OXYCODONE HYDROCHLORIDE 5 MG/1
5 TABLET ORAL ONCE
Refills: 0 | Status: DISCONTINUED | OUTPATIENT
Start: 2021-01-06 | End: 2021-01-06

## 2021-01-06 RX ORDER — FAMOTIDINE 10 MG/ML
20 INJECTION INTRAVENOUS ONCE
Refills: 0 | Status: COMPLETED | OUTPATIENT
Start: 2021-01-06 | End: 2021-01-06

## 2021-01-06 RX ORDER — ONDANSETRON 8 MG/1
4 TABLET, FILM COATED ORAL EVERY 6 HOURS
Refills: 0 | Status: DISCONTINUED | OUTPATIENT
Start: 2021-01-06 | End: 2021-01-07

## 2021-01-06 RX ORDER — FENTANYL CITRATE 50 UG/ML
50 INJECTION INTRAVENOUS
Refills: 0 | Status: DISCONTINUED | OUTPATIENT
Start: 2021-01-06 | End: 2021-01-06

## 2021-01-06 RX ORDER — ONDANSETRON 8 MG/1
4 TABLET, FILM COATED ORAL EVERY 6 HOURS
Refills: 0 | Status: DISCONTINUED | OUTPATIENT
Start: 2021-01-06 | End: 2021-01-06

## 2021-01-06 RX ORDER — ACETAMINOPHEN 500 MG
650 TABLET ORAL EVERY 6 HOURS
Refills: 0 | Status: DISCONTINUED | OUTPATIENT
Start: 2021-01-06 | End: 2021-01-07

## 2021-01-06 RX ORDER — HYDROMORPHONE HYDROCHLORIDE 2 MG/ML
0.5 INJECTION INTRAMUSCULAR; INTRAVENOUS; SUBCUTANEOUS EVERY 4 HOURS
Refills: 0 | Status: DISCONTINUED | OUTPATIENT
Start: 2021-01-06 | End: 2021-01-07

## 2021-01-06 RX ORDER — TRAZODONE HCL 50 MG
300 TABLET ORAL AT BEDTIME
Refills: 0 | Status: DISCONTINUED | OUTPATIENT
Start: 2021-01-06 | End: 2021-01-07

## 2021-01-06 RX ORDER — SODIUM CHLORIDE 9 MG/ML
1000 INJECTION, SOLUTION INTRAVENOUS
Refills: 0 | Status: DISCONTINUED | OUTPATIENT
Start: 2021-01-06 | End: 2021-01-07

## 2021-01-06 RX ORDER — ACETAMINOPHEN 500 MG
975 TABLET ORAL ONCE
Refills: 0 | Status: COMPLETED | OUTPATIENT
Start: 2021-01-06 | End: 2021-01-06

## 2021-01-06 RX ORDER — CEFAZOLIN SODIUM 1 G
2000 VIAL (EA) INJECTION EVERY 8 HOURS
Refills: 0 | Status: COMPLETED | OUTPATIENT
Start: 2021-01-06 | End: 2021-01-07

## 2021-01-06 RX ADMIN — Medication 100 MILLIGRAM(S): at 21:55

## 2021-01-06 RX ADMIN — SODIUM CHLORIDE 3 MILLILITER(S): 9 INJECTION INTRAMUSCULAR; INTRAVENOUS; SUBCUTANEOUS at 21:55

## 2021-01-06 RX ADMIN — Medication 975 MILLIGRAM(S): at 10:35

## 2021-01-06 RX ADMIN — Medication 300 MILLIGRAM(S): at 21:55

## 2021-01-06 RX ADMIN — FAMOTIDINE 20 MILLIGRAM(S): 10 INJECTION INTRAVENOUS at 10:35

## 2021-01-06 NOTE — BRIEF OPERATIVE NOTE - NSICDXBRIEFPREOP_GEN_ALL_CORE_FT
PRE-OP DIAGNOSIS:  Breast cancer, right 06-Jan-2021 15:40:50  Alfonzo Casillas  
PRE-OP DIAGNOSIS:  Breast cancer, right 06-Jan-2021 15:40:50  Alfonzo Casillas

## 2021-01-06 NOTE — BRIEF OPERATIVE NOTE - NSICDXBRIEFPROCEDURE_GEN_ALL_CORE_FT
PROCEDURES:  Reconstruction of right breast with tissue expander 06-Jan-2021 15:46:01  Alfonzo Casillas

## 2021-01-06 NOTE — BRIEF OPERATIVE NOTE - OPERATION/FINDINGS
sentinel node #1 positive for carcinoma on pathologic review
right axillary sentinel lymph node positive for carcinoma on intraoperative pathologic review (touch prep)

## 2021-01-06 NOTE — BRIEF OPERATIVE NOTE - SPECIMENS
none
right breast , right axillary sentinel lymph node x2, right mastectomy flap anterior margin @ 7:00 overlying tumors

## 2021-01-06 NOTE — BRIEF OPERATIVE NOTE - NSICDXBRIEFPROCEDURE_GEN_ALL_CORE_FT
PROCEDURES:  Mastectomy, simple, with sentinel lymph node mapping and biopsy 06-Jan-2021 11:15:03 nipple sparing mastectomy Alfonzo Casillas   PROCEDURES:  Axillary lymph node dissection with sentinel lymph node biopsy 06-Jan-2021 15:40:28  Alfonzo Casillas  Mastectomy, simple, with sentinel lymph node mapping and biopsy 06-Jan-2021 11:15:03 nipple sparing mastectomy Alfonzo Casillas

## 2021-01-07 ENCOUNTER — TRANSCRIPTION ENCOUNTER (OUTPATIENT)
Age: 76
End: 2021-01-07

## 2021-01-07 ENCOUNTER — NON-APPOINTMENT (OUTPATIENT)
Age: 76
End: 2021-01-07

## 2021-01-07 VITALS
OXYGEN SATURATION: 96 % | TEMPERATURE: 98 F | RESPIRATION RATE: 19 BRPM | SYSTOLIC BLOOD PRESSURE: 106 MMHG | DIASTOLIC BLOOD PRESSURE: 42 MMHG | HEART RATE: 75 BPM

## 2021-01-07 PROBLEM — M47.816 SPONDYLOSIS WITHOUT MYELOPATHY OR RADICULOPATHY, LUMBAR REGION: Chronic | Status: ACTIVE | Noted: 2020-12-31

## 2021-01-07 PROBLEM — M54.16 RADICULOPATHY, LUMBAR REGION: Chronic | Status: ACTIVE | Noted: 2020-12-31

## 2021-01-07 RX ORDER — MUPIROCIN 20 MG/G
0 OINTMENT TOPICAL
Qty: 0 | Refills: 0 | DISCHARGE

## 2021-01-07 RX ORDER — ACETAMINOPHEN 500 MG
975 TABLET ORAL ONCE
Refills: 0 | Status: COMPLETED | OUTPATIENT
Start: 2021-01-07 | End: 2021-01-07

## 2021-01-07 RX ORDER — ASPIRIN/CALCIUM CARB/MAGNESIUM 324 MG
1 TABLET ORAL
Qty: 0 | Refills: 0 | DISCHARGE

## 2021-01-07 RX ADMIN — SODIUM CHLORIDE 3 MILLILITER(S): 9 INJECTION INTRAMUSCULAR; INTRAVENOUS; SUBCUTANEOUS at 05:54

## 2021-01-07 RX ADMIN — Medication 100 MILLIGRAM(S): at 05:30

## 2021-01-07 RX ADMIN — Medication 975 MILLIGRAM(S): at 08:49

## 2021-01-07 NOTE — DISCHARGE NOTE PROVIDER - CARE PROVIDER_API CALL
Gerry Rueda  OTOLARYNGOLOGY  [address] [place] [address], [address]  [place] [address]  Phone: [phone]  Fax: [phone]  Follow Up Time:

## 2021-01-07 NOTE — DISCHARGE NOTE PROVIDER - NSDCCPGOAL_GEN_ALL_CORE_FT
To get better and follow your care plan as instructed.  Follow instructions provided by Dr. Rueda's office.  Empty drain and record output twice daily.

## 2021-01-07 NOTE — DISCHARGE NOTE NURSING/CASE MANAGEMENT/SOCIAL WORK - PATIENT PORTAL LINK FT
You can access the FollowMyHealth Patient Portal offered by [facility] by registering at the following website: [email or web address]. By joining [facility]’s FollowMyHealth portal, you will also be able to view your health information using other applications (apps) compatible with our system.

## 2021-01-07 NOTE — DISCHARGE NOTE PROVIDER - HOSPITAL COURSE
Patient underwent right mastectomy and breast reconstruction with tissue expander and ADM in the OR. The patient tolerated the procedure well. Postoperatively the patient was sent to the PACU. On POD 1, the patient was hemodynamically stable, was tolerating a regular diet, was placed on her home medications, was out of bed ambulating.  During the patient's hospital course, the patient's pain was controlled by IV pain medications and then by PO pain medications.    The patient was instructed to follow up with Dr. Rueda within 1 week after discharge from the hospital. The patient felt comfortable with discharge.  The patient received prescriptions for oral pain medication and oral antibiotics preoperatively from the attending surgeon.

## 2021-01-07 NOTE — DISCHARGE NOTE NURSING/CASE MANAGEMENT/SOCIAL WORK - NSDCCRNAME_GEN_ALL_CORE_FT
yellow discharge folder including community resources and private hire list, home care agency list provided

## 2021-01-07 NOTE — PROGRESS NOTE ADULT - BREASTS DETAILS
Right mastectomy flap is warm to touch. Ecchymosis of the nipple areola complex , viable.  GENESIS's with serosanguinous drainage.

## 2021-01-07 NOTE — PROGRESS NOTE ADULT - ASSESSMENT
Patient is hemodynamically stable on POD #1.  Drain care teaching with nursing.  VNS consult.  Discharge to home.

## 2021-01-07 NOTE — DISCHARGE NOTE PROVIDER - NSDCACTIVITY_GEN_ALL_CORE
Do not drive or operate machinery/Stairs allowed/Walking - Indoors allowed/No heavy lifting/straining

## 2021-01-07 NOTE — PROGRESS NOTE ADULT - SUBJECTIVE AND OBJECTIVE BOX
Patient comfortable. Without complaints.  vss/a  jps mod serosang  mastectomy flaps appear viable  nipple is slightly dusky but has capillary refill.    Stable  -reviewed sponge bathing, ROM, activity restrictions. no hot or cold packs, meds, jps, and f/u  -reviewed plan with Dr. Rueda and nursing.  -plan for d/c home later today.
                                                                     SURGICAL PROGRESS NOTE    STATUS POST:  Right nipple sparing mastectomy, sentinel node biopsy, completion axillary node dissection and tissue expander recosntruction.    POST OPERATIVE DAY #: 1    Vital Signs Last 24 Hrs  T(C): 36.9 (07 Jan 2021 04:17), Max: 36.9 (06 Jan 2021 21:25)  T(F): 98.4 (07 Jan 2021 04:17), Max: 98.5 (06 Jan 2021 21:25)  HR: 70 (07 Jan 2021 04:17) (66 - 79)  BP: 107/47 (07 Jan 2021 04:17) (93/53 - 140/80)  BP(mean): --  RR: 18 (07 Jan 2021 04:17) (12 - 18)  SpO2: 94% (07 Jan 2021 04:17) (92% - 99%)      SUBJECTIVE:     Patient has mild to moderate postop pain. Has not taken any pain medication.    I&O's Detail    06 Jan 2021 07:01  -  07 Jan 2021 07:00  --------------------------------------------------------  IN:    Other (mL): 1200 mL  Total IN: 1200 mL    OUT:    Bulb (mL): 85 mL    Bulb (mL): 145 mL    Voided (mL): 300 mL  Total OUT: 530 mL    Total NET: 670 mL          MEDICATIONS  (STANDING):  lactated ringers. 1000 milliLiter(s) (50 mL/Hr) IV Continuous <Continuous>  sodium chloride 0.9% lock flush 3 milliLiter(s) IV Push every 8 hours  traZODone 300 milliGRAM(s) Oral at bedtime    MEDICATIONS  (PRN):  acetaminophen   Tablet .. 650 milliGRAM(s) Oral every 6 hours PRN Moderate Pain (4 - 6)  HYDROmorphone  Injectable 0.5 milliGRAM(s) IV Push every 4 hours PRN Severe Pain (7 - 10)  ondansetron Injectable 4 milliGRAM(s) IV Push every 6 hours PRN Nausea

## 2021-01-07 NOTE — DISCHARGE NOTE PROVIDER - NSDCMRMEDTOKEN_GEN_ALL_CORE_FT
cholecalciferol oral tablet: 2000 unit(s) orally once a day  desvenlafaxine (as base) 100 mg oral tablet, extended release: 1 tab(s) orally once a day  rosuvastatin 40 mg oral tablet: 1 tab(s) orally once a day  traZODone: 300 milligram(s) orally once a day (at bedtime)

## 2021-01-13 ENCOUNTER — APPOINTMENT (OUTPATIENT)
Dept: PLASTIC SURGERY | Facility: CLINIC | Age: 76
End: 2021-01-13
Payer: MEDICARE

## 2021-01-13 VITALS
HEIGHT: 61 IN | BODY MASS INDEX: 25.49 KG/M2 | HEART RATE: 73 BPM | WEIGHT: 135 LBS | TEMPERATURE: 97.6 F | OXYGEN SATURATION: 96 % | SYSTOLIC BLOOD PRESSURE: 119 MMHG | DIASTOLIC BLOOD PRESSURE: 69 MMHG

## 2021-01-13 PROCEDURE — 99024 POSTOP FOLLOW-UP VISIT: CPT

## 2021-01-14 ENCOUNTER — APPOINTMENT (OUTPATIENT)
Dept: BREAST CENTER | Facility: CLINIC | Age: 76
End: 2021-01-14
Payer: MEDICARE

## 2021-01-14 DIAGNOSIS — E78.00 PURE HYPERCHOLESTEROLEMIA, UNSPECIFIED: ICD-10-CM

## 2021-01-14 DIAGNOSIS — Z88.0 ALLERGY STATUS TO PENICILLIN: ICD-10-CM

## 2021-01-14 DIAGNOSIS — Z88.8 ALLERGY STATUS TO OTHER DRUGS, MEDICAMENTS AND BIOLOGICAL SUBSTANCES: ICD-10-CM

## 2021-01-14 DIAGNOSIS — Z85.3 PERSONAL HISTORY OF MALIGNANT NEOPLASM OF BREAST: ICD-10-CM
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
## 2021-01-14 DIAGNOSIS — Z17.0 ESTROGEN RECEPTOR POSITIVE STATUS [ER+]: ICD-10-CM

## 2021-01-14 DIAGNOSIS — Z90.710 ACQUIRED ABSENCE OF BOTH CERVIX AND UTERUS: ICD-10-CM

## 2021-01-14 DIAGNOSIS — F41.9 ANXIETY DISORDER, UNSPECIFIED: ICD-10-CM
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
## 2021-01-14 DIAGNOSIS — Z87.891 PERSONAL HISTORY OF NICOTINE DEPENDENCE: ICD-10-CM

## 2021-01-14 DIAGNOSIS — L76.82 OTHER POSTPROCEDURAL COMPLICATIONS OF SKIN AND SUBCUTANEOUS TISSUE: ICD-10-CM

## 2021-01-14 DIAGNOSIS — Z92.3 PERSONAL HISTORY OF IRRADIATION: ICD-10-CM

## 2021-01-14 DIAGNOSIS — C50.511 MALIGNANT NEOPLASM OF LOWER-OUTER QUADRANT OF RIGHT FEMALE BREAST: ICD-10-CM

## 2021-01-14 DIAGNOSIS — Z86.000 PERSONAL HISTORY OF IN-SITU NEOPLASM OF BREAST: ICD-10-CM

## 2021-01-14 DIAGNOSIS — C77.3 SECONDARY AND UNSPECIFIED MALIGNANT NEOPLASM OF AXILLA AND UPPER LIMB LYMPH NODES: ICD-10-CM

## 2021-01-14 PROCEDURE — 99024 POSTOP FOLLOW-UP VISIT: CPT

## 2021-01-14 NOTE — DATA REVIEWED
[FreeTextEntry1] : Mammogram:   10/07/20     Findings:Architectural distortion central inferior right bresat which is new. \par 2 biopsy markers in the right breast.    \par \par Right breast ultrasound:  10/7/20  Findings:  1.5 x 1.3 x 0.8 cm heterogeneous mass at 7:00 N6 \par \par US guided core biopsy right breast (7:00 N6 winged shaped clip) : 10/27/20  PATHOLOGY:  Infiltrating ductal carcinoma, moderately differentiated. No LVI.  ER - - 90%,  DC -40%, HER2 - negative. \par \par Breast MRI:  11/10/2020    Findings:  There are scattered enhancing nonspecific foci. There is an irregular enhancing mass in the lower outer right breast ( Seq 14948, Im 44 ) posteriorly measuring 2.1 x 1.6 x 2.0 cm which correlates to the newly diagnosed infiltrating ductal cancer. There is a focal nonmass enhancement in the lower central right breast measuring approximately 2 cm and located approximately 2 cm anterior to the known cancer ( Seq 25317, Im 53 ) for which further evaluation with targeted ultrasound is recommended and ultrasound-guided core biopsy for correlation is identified. If no correlate is seen, then MRI guided core biopsy is recommended. The right nipple is rotated far medially and there is underlying enhancement in the nipple areolar complex complex. This may be artifactual; however, further evaluation with targeted ultrasound is recommended to exclude underlying mass. There is a right 2- 3 mm focus of enhancement in the upper slightly outer right breast ( Seq 55424, Im 91) for which MRI guided core biopsy is recommended.\par \par LEFT BREAST:\par There are scattered enhancing nonspecific foci. There are post lumpectomy changes in the left breast far posteriorly where susceptibility artifact from clips at the lumpectomy site are noted. There is no suspicious enhancement in the left breast.\par \par \par IMPRESSION:\par \par Irregular enhancing mass in the lower outer right breast posteriorly consistent with the known newly diagnosed infiltrating cancer.\par \par 2 cm area of nonmass enhancement in the lower central right breast for which further evaluation with targeted ultrasound is recommended. If a sonographic correlate is identified if correlates are identified., ultrasound-guided core biopsy is recommended. If no correlate is identified, then further evaluation with MRI core biopsy is recommended.\par \par There is enhancement in the right nipple areolar complex which may be artifactual. However, further evaluation with targeted ultrasound to exclude an underlying lesion is recommended. If an underlying lesion is identified, then ultrasound-guided core biopsy may be performed.\par \par 2-3 mm focus of enhancement in the upper slightly outer right breast for which MRI guided core biopsy is recommended.  RECOMMENDATION:  MR guided biopsy.\par \par As above, ultrasound-guided core biopsies in the right breast are also recommended if correlates are identified.\par \par \par Targeted right 11/19/20   Findings:  At 7:00 N4 right breast there is a 15 x 9 x 6 mm focal irregular mass which c/w with region of nonmass enhancement in the lower central right breast.  There is no sonographic abnormality at the right nipple areola region.   \par \par Ultrasound guided core biopsy Right breast 7:00 N4: 11/19/20   Pathology: Infiltrating Ductal carcinoma. ( 6/9). ER - 95%, DC - 60%, HER2 - negative. \par \par SURGICAL PATHOLOGY:  1/6/21  Results:  1) Right breast - retronipple tissue - negative.  Infiltrating ductal carcinoma, 2 sites measuring 2.4 cm and 1.9 cm. Milan score 6/9.  Margins negative.  \par Center node # 1 - 8 mm metastatic focus with extranodal extension.  Axillary lymphadenectomy - 10 negative nodes.    ER and DC positive, HER 2 negative ( for both tumors).\par \par \par \par \par \par \par \par    \par

## 2021-01-14 NOTE — PHYSICAL EXAM
[Sclera nonicteric] : sclera nonicteric [No Cervical Adenopathy] : no cervical adenopathy [No Rubs] : no pericardial rub [Examined in the supine and seated position] : examined in the supine and seated position [No Edema] : no edema [No Rashes] : no rashes [No Ulceration] : no ulceration [de-identified] : Mastectomy flap is warm.  Nipple areola complex viable.   No erythema.  GENESIS with serous drainage. [de-identified] : Lumpectomy scar at 1-2:00 with volume loss deformity.

## 2021-01-14 NOTE — CONSULT LETTER
[Dear  ___] : Dear ~DARYN, [Courtesy Letter:] : I had the pleasure of seeing your patient, [unfilled], in my office today. [Please see my note below.] : Please see my note below. [Consult Closing:] : Thank you very much for allowing me to participate in the care of this patient.  If you have any questions, please do not hesitate to contact me. [Sincerely,] : Sincerely, [FreeTextEntry2] : Abad Farrell MD [FreeTextEntry3] : Darshana Smallwood MD FACS\par  [DrBarb  ___] : Dr. BRUNNER

## 2021-01-14 NOTE — HISTORY OF PRESENT ILLNESS
[FreeTextEntry1] : 75 year old female presents for a postop visit.  She underwent a right nipple sparing mastectomy, sentinel node biopsy and completion axillary node dissection. A tissue expander was placed by Dr. Rueda.

## 2021-01-16 ENCOUNTER — OUTPATIENT (OUTPATIENT)
Dept: OUTPATIENT SERVICES | Facility: HOSPITAL | Age: 76
LOS: 1 days | Discharge: HOME | End: 2021-01-16

## 2021-01-16 DIAGNOSIS — Z90.710 ACQUIRED ABSENCE OF BOTH CERVIX AND UTERUS: Chronic | ICD-10-CM

## 2021-01-16 DIAGNOSIS — C50.919 MALIGNANT NEOPLASM OF UNSPECIFIED SITE OF UNSPECIFIED FEMALE BREAST: ICD-10-CM

## 2021-01-16 DIAGNOSIS — Z98.890 OTHER SPECIFIED POSTPROCEDURAL STATES: Chronic | ICD-10-CM

## 2021-01-16 DIAGNOSIS — Z95.5 PRESENCE OF CORONARY ANGIOPLASTY IMPLANT AND GRAFT: Chronic | ICD-10-CM

## 2021-01-17 DIAGNOSIS — H61.22 IMPACTED CERUMEN, LEFT EAR: ICD-10-CM

## 2021-01-17 DIAGNOSIS — G89.18 OTHER ACUTE POSTPROCEDURAL PAIN: ICD-10-CM

## 2021-01-17 DIAGNOSIS — R92.8 OTHER ABNORMAL AND INCONCLUSIVE FINDINGS ON DIAGNOSTIC IMAGING OF BREAST: ICD-10-CM

## 2021-01-21 ENCOUNTER — APPOINTMENT (OUTPATIENT)
Age: 76
End: 2021-01-21
Payer: MEDICARE

## 2021-01-21 ENCOUNTER — APPOINTMENT (OUTPATIENT)
Dept: PLASTIC SURGERY | Facility: CLINIC | Age: 76
End: 2021-01-21
Payer: MEDICARE

## 2021-01-21 VITALS
TEMPERATURE: 96.8 F | SYSTOLIC BLOOD PRESSURE: 124 MMHG | HEART RATE: 80 BPM | WEIGHT: 135 LBS | BODY MASS INDEX: 25.51 KG/M2 | DIASTOLIC BLOOD PRESSURE: 74 MMHG

## 2021-01-21 PROCEDURE — 99024 POSTOP FOLLOW-UP VISIT: CPT

## 2021-01-21 PROCEDURE — 99205 OFFICE O/P NEW HI 60 MIN: CPT

## 2021-01-21 RX ORDER — OXYCODONE 5 MG/1
5 TABLET ORAL
Qty: 10 | Refills: 0 | Status: DISCONTINUED | COMMUNITY
Start: 2020-12-22 | End: 2021-01-21

## 2021-01-21 RX ORDER — OXYCODONE AND ACETAMINOPHEN 5; 325 MG/1; MG/1
5-325 TABLET ORAL
Qty: 20 | Refills: 0 | Status: DISCONTINUED | COMMUNITY
Start: 2021-01-04 | End: 2021-01-21

## 2021-01-21 RX ORDER — SULFAMETHOXAZOLE AND TRIMETHOPRIM 800; 160 MG/1; MG/1
800-160 TABLET ORAL
Qty: 14 | Refills: 0 | Status: DISCONTINUED | COMMUNITY
Start: 2021-01-04 | End: 2021-01-21

## 2021-01-21 RX ORDER — TRAZODONE HYDROCHLORIDE 100 MG/1
100 TABLET ORAL
Refills: 0 | Status: ACTIVE | COMMUNITY

## 2021-01-21 RX ORDER — ASPIRIN 81 MG
81 TABLET, DELAYED RELEASE (ENTERIC COATED) ORAL DAILY
Refills: 0 | Status: ACTIVE | COMMUNITY

## 2021-01-21 NOTE — ASSESSMENT
[FreeTextEntry1] : Ms. LÓPEZ 's questions were answered to her satisfaction. She  expressed her  understanding and willingness to comply with the above recommendations, and  will return to the office in 3 weeks.\par \par \par \par \par \par \par \par

## 2021-01-21 NOTE — PHYSICAL EXAM
[Fully active, able to carry on all pre-disease performance without restriction] : Status 0 - Fully active, able to carry on all pre-disease performance without restriction [Normal] : affect appropriate [de-identified] : Status post right nipple sparing mastectomy with tissue expander reconstruction; horizontal scar healing. Right side draining catheter in place- site clean.  No palpable masses left breast. [de-identified] : Status post right sentinel lymph node biopsy

## 2021-01-21 NOTE — RESULTS/DATA
[FreeTextEntry1] : Blood work results, imaging studies, and pathology reports reviewed in detail with patient .\par

## 2021-01-21 NOTE — CONSULT LETTER
[Dear  ___] : Dear  [unfilled], [Consult Letter:] : I had the pleasure of evaluating your patient, [unfilled]. [Please see my note below.] : Please see my note below. [Consult Closing:] : Thank you very much for allowing me to participate in the care of this patient.  If you have any questions, please do not hesitate to contact me. [Sincerely,] : Sincerely, [___] : [unfilled] [FreeTextEntry2] : Darshana Smallwood M.D.  [FreeTextEntry3] : EDUIN SHINE M.D.\par Medical Oncology\par [facility]\par [facility]\par [address] [place] [address], [facility]\par [place] [address]\par Telephone: [phone]\par FAX: [phone]\par \par

## 2021-01-21 NOTE — REASON FOR VISIT
[Initial Consultation] : an initial consultation [Family Member] : family member [FreeTextEntry2] : Right breast cancer

## 2021-01-21 NOTE — HISTORY OF PRESENT ILLNESS
[Disease: _____________________] : Disease: [unfilled] [de-identified] : 75 F with past medical history of hypercholesterolemia, s/p hysterectomy, hearing impairement, diagnosed with ER/IN positive, Her 2 not-amplified infiltrating ductal carcinoma right breast in October 2020.\par \par CASE SYNOPSIS:\par  10/7/20- mammogram: New architectural distortion central inferior right breast; 2 biopsy markers in the right breast.\par \par  10/7/20- breast US: 1.5 x 1.3 x 0.8 cm heterogeneous mass at 7:00 N6\par \par  10/27/20 - US guided core biopsy R breast; (7:00 N6 winged shaped clip) ;pathology c/w  infiltrating ductal carcinoma, moderately differentiated. No LVI. ER 90%, IN -40%, HER2 - negative.\par \par  11/10/20 - breast MRI : scattered enhancing nonspecific foci. There is an irregular enhancing mass in the lower outer right breast ( Seq 56054, Im 44 ) posteriorly measuring 2.1 x 1.6 x 2.0 cm which correlates to the newly diagnosed infiltrating ductal cancer. There is a focal nonmass enhancement in the lower central right breast measuring approximately 2 cm and located approximately 2 cm anterior to the known cancer ( Seq 99198, Im 53 ) for which further evaluation with targeted ultrasound is recommended and ultrasound-guided core biopsy for correlation is identified. If no correlate is seen, then MRI guided core biopsy is recommended. The right nipple is rotated far medially and there is underlying enhancement in the nipple areolar complex complex. This may be artifactual; however, further evaluation with targeted ultrasound is recommended to exclude underlying mass. 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  Has a residual right chest wall draining tube which might be discontinued today by plastic surgery.   Ms. LÓPEZ is of [employer] descent on both maternal and paternal side; denies family history of malignancy.  He is a retired , , has 2 sons; here accompanied by son, Sajan.\par  [de-identified] : Filtrating ductal carcinoma [de-identified] : ER 90%, MT -40%, HER2 - negative. [FreeTextEntry1] : Adjuvant systemic chemotherapy

## 2021-01-26 DIAGNOSIS — Z82.49 FAMILY HISTORY OF ISCHEMIC HEART DISEASE AND OTHER DISEASES OF THE CIRCULATORY SYSTEM: ICD-10-CM

## 2021-01-26 DIAGNOSIS — Z87.891 PERSONAL HISTORY OF NICOTINE DEPENDENCE: ICD-10-CM

## 2021-01-26 DIAGNOSIS — I25.2 OLD MYOCARDIAL INFARCTION: ICD-10-CM

## 2021-01-27 ENCOUNTER — APPOINTMENT (OUTPATIENT)
Dept: PLASTIC SURGERY | Facility: CLINIC | Age: 76
End: 2021-01-27

## 2021-01-27 ENCOUNTER — APPOINTMENT (OUTPATIENT)
Age: 76
End: 2021-01-27
Payer: MEDICARE

## 2021-01-27 VITALS
TEMPERATURE: 97.8 F | RESPIRATION RATE: 16 BRPM | HEART RATE: 71 BPM | WEIGHT: 135 LBS | BODY MASS INDEX: 25.49 KG/M2 | HEIGHT: 61 IN | SYSTOLIC BLOOD PRESSURE: 135 MMHG | DIASTOLIC BLOOD PRESSURE: 76 MMHG

## 2021-01-27 PROCEDURE — 99215 OFFICE O/P EST HI 40 MIN: CPT

## 2021-02-01 ENCOUNTER — APPOINTMENT (OUTPATIENT)
Dept: PLASTIC SURGERY | Facility: CLINIC | Age: 76
End: 2021-02-01

## 2021-02-03 ENCOUNTER — RESULT REVIEW (OUTPATIENT)
Age: 76
End: 2021-02-03

## 2021-02-05 ENCOUNTER — RESULT REVIEW (OUTPATIENT)
Age: 76
End: 2021-02-05

## 2021-02-05 ENCOUNTER — APPOINTMENT (OUTPATIENT)
Age: 76
End: 2021-02-05

## 2021-02-05 ENCOUNTER — LABORATORY RESULT (OUTPATIENT)
Age: 76
End: 2021-02-05

## 2021-02-05 ENCOUNTER — NON-APPOINTMENT (OUTPATIENT)
Age: 76
End: 2021-02-05

## 2021-02-09 ENCOUNTER — APPOINTMENT (OUTPATIENT)
Age: 76
End: 2021-02-09

## 2021-02-09 VITALS
TEMPERATURE: 96 F | DIASTOLIC BLOOD PRESSURE: 80 MMHG | WEIGHT: 136.3 LBS | BODY MASS INDEX: 25.75 KG/M2 | HEART RATE: 90 BPM | SYSTOLIC BLOOD PRESSURE: 145 MMHG

## 2021-02-10 ENCOUNTER — OUTPATIENT (OUTPATIENT)
Dept: OUTPATIENT SERVICES | Facility: HOSPITAL | Age: 76
LOS: 1 days | Discharge: HOME | End: 2021-02-10

## 2021-02-10 DIAGNOSIS — R11.2 NAUSEA WITH VOMITING, UNSPECIFIED: ICD-10-CM

## 2021-02-10 DIAGNOSIS — Z90.710 ACQUIRED ABSENCE OF BOTH CERVIX AND UTERUS: Chronic | ICD-10-CM

## 2021-02-10 DIAGNOSIS — Z95.5 PRESENCE OF CORONARY ANGIOPLASTY IMPLANT AND GRAFT: Chronic | ICD-10-CM

## 2021-02-10 DIAGNOSIS — Z98.890 OTHER SPECIFIED POSTPROCEDURAL STATES: Chronic | ICD-10-CM

## 2021-02-10 DIAGNOSIS — C50.919 MALIGNANT NEOPLASM OF UNSPECIFIED SITE OF UNSPECIFIED FEMALE BREAST: ICD-10-CM

## 2021-02-10 DIAGNOSIS — Z51.11 ENCOUNTER FOR ANTINEOPLASTIC CHEMOTHERAPY: ICD-10-CM

## 2021-02-11 LAB — SURGICAL PATHOLOGY STUDY: SIGNIFICANT CHANGE UP

## 2021-02-16 ENCOUNTER — APPOINTMENT (OUTPATIENT)
Age: 76
End: 2021-02-16
Payer: MEDICARE

## 2021-02-16 VITALS
DIASTOLIC BLOOD PRESSURE: 61 MMHG | HEART RATE: 94 BPM | RESPIRATION RATE: 16 BRPM | SYSTOLIC BLOOD PRESSURE: 111 MMHG | TEMPERATURE: 97.3 F | WEIGHT: 135 LBS | HEIGHT: 61 IN | BODY MASS INDEX: 25.49 KG/M2

## 2021-02-16 PROCEDURE — 99214 OFFICE O/P EST MOD 30 MIN: CPT

## 2021-02-16 NOTE — REVIEW OF SYSTEMS
[Patient Intake Form Reviewed] : Patient intake form was reviewed [Negative] : Psychiatric [Fatigue] : fatigue [Recent Change In Weight] : ~T no recent weight change [Abdominal Pain] : abdominal pain [Vomiting] : no vomiting [Constipation] : no constipation [de-identified] : pruritus; dry skin; some hair loss

## 2021-02-16 NOTE — HISTORY OF PRESENT ILLNESS
[Disease: _____________________] : Disease: [unfilled] [de-identified] : 76 F with past medical history of hypercholesterolemia, s/p hysterectomy, hearing impairement, diagnosed with ER/LA positive, Her 2 not-amplified infiltrating ductal carcinoma right breast in October 2020.\par \par CASE SYNOPSIS:\par  10/7/20- mammogram: New architectural distortion central inferior right breast; 2 biopsy markers in the right breast.\par \par  10/7/20- breast US: 1.5 x 1.3 x 0.8 cm heterogeneous mass at 7:00 N6\par \par  10/27/20 - US guided core biopsy R breast; (7:00 N6 winged shaped clip) ;pathology c/w  infiltrating ductal carcinoma, moderately differentiated. No LVI. ER 90%, LA -40%, HER2 - negative.\par \par  11/10/20 - breast MRI : scattered enhancing nonspecific foci. There is an irregular enhancing mass in the lower outer right breast ( Seq 65999, Im 44 ) posteriorly measuring 2.1 x 1.6 x 2.0 cm which correlates to the newly diagnosed infiltrating ductal cancer. There is a focal nonmass enhancement in the lower central right breast measuring approximately 2 cm and located approximately 2 cm anterior to the known cancer ( Seq 84353, Im 53 ) for which further evaluation with targeted ultrasound is recommended and ultrasound-guided core biopsy for correlation is identified. If no correlate is seen, then MRI guided core biopsy is recommended. The right nipple is rotated far medially and there is underlying enhancement in the nipple areolar complex complex. This may be artifactual; however, further evaluation with targeted ultrasound is recommended to exclude underlying mass. 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 [FreeTextEntry1] : Adjuvant systemic chemotherapy [de-identified] :  is returning for short-term follow-up; on 2/5/21 she started  1st cycle of systemic chemotherapy for stage II infiltrating ductal carcinoma ER 90% positive, HER-2 negative.  Patient tolerated treatment with no significant reaction, despite not using dexamethasone as premedication, due to patient's reported  past psychiatric complications when exposed to steroids ( paranoid ideation).  She reports side effects following her first exposure to chemotherapy including : fatigue, stomach discomfort/ ache, no nausea or vomiting, dry skin, pruritus, loss of appetite but no loss of weight, minimal loss of hair.  Symptoms are getting better.  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  Did not see her dentist in a while.  Review of recent blood work consistent with slight decrease in GFR (44), and CA 27–29 within normal range.

## 2021-02-16 NOTE — ASSESSMENT
[FreeTextEntry1] : Ms. LÓPEZ 's questions were answered to her satisfaction. She  expressed her  understanding and willingness to comply with the above recommendations, and  will return to the office in 6 weeks.\par \par \par \par \par \par \par \par

## 2021-02-16 NOTE — REASON FOR VISIT
[Follow-Up Visit] : a follow-up [Family Member] : family member [FreeTextEntry2] : right breast cancer

## 2021-02-16 NOTE — PHYSICAL EXAM
[Normal] : full range of motion and no deformities appreciated [Restricted in physically strenuous activity but ambulatory and able to carry out work of a light or sedentary nature] : Status 1- Restricted in physically strenuous activity but ambulatory and able to carry out work of a light or sedentary nature, e.g., light house work, office work [de-identified] : Status post right nipple sparing mastectomy with tissue expander reconstruction; horizontal scar healing. Right side draining catheter in place- site clean.  No palpable masses left breast. [de-identified] : Status post right sentinel lymph node biopsy [de-identified] : dry skin

## 2021-02-17 ENCOUNTER — APPOINTMENT (OUTPATIENT)
Dept: PLASTIC SURGERY | Facility: CLINIC | Age: 76
End: 2021-02-17
Payer: MEDICARE

## 2021-02-17 PROCEDURE — 99024 POSTOP FOLLOW-UP VISIT: CPT

## 2021-02-23 ENCOUNTER — NON-APPOINTMENT (OUTPATIENT)
Age: 76
End: 2021-02-23

## 2021-03-01 ENCOUNTER — APPOINTMENT (OUTPATIENT)
Age: 76
End: 2021-03-01

## 2021-03-02 ENCOUNTER — RESULT REVIEW (OUTPATIENT)
Age: 76
End: 2021-03-02

## 2021-03-02 ENCOUNTER — APPOINTMENT (OUTPATIENT)
Age: 76
End: 2021-03-02

## 2021-03-02 VITALS
TEMPERATURE: 96.1 F | WEIGHT: 135.4 LBS | BODY MASS INDEX: 25.58 KG/M2 | DIASTOLIC BLOOD PRESSURE: 74 MMHG | SYSTOLIC BLOOD PRESSURE: 129 MMHG | HEART RATE: 67 BPM

## 2021-03-02 LAB
ALBUMIN SERPL ELPH-MCNC: 4.7 G/DL — SIGNIFICANT CHANGE UP (ref 3.3–5)
ALP SERPL-CCNC: 100 U/L — SIGNIFICANT CHANGE UP (ref 40–120)
ALT FLD-CCNC: 12 U/L — SIGNIFICANT CHANGE UP (ref 10–45)
ANION GAP SERPL CALC-SCNC: 10 MMOL/L — SIGNIFICANT CHANGE UP (ref 5–17)
AST SERPL-CCNC: 20 U/L — SIGNIFICANT CHANGE UP (ref 10–40)
BASOPHILS # BLD AUTO: 0.09 K/UL — SIGNIFICANT CHANGE UP (ref 0–0.2)
BASOPHILS NFR BLD AUTO: 1 % — SIGNIFICANT CHANGE UP (ref 0–2)
BILIRUB SERPL-MCNC: 0.2 MG/DL — SIGNIFICANT CHANGE UP (ref 0.2–1.2)
BUN SERPL-MCNC: 21 MG/DL — SIGNIFICANT CHANGE UP (ref 7–23)
CALCIUM SERPL-MCNC: 10 MG/DL — SIGNIFICANT CHANGE UP (ref 8.4–10.5)
CHLORIDE SERPL-SCNC: 101 MMOL/L — SIGNIFICANT CHANGE UP (ref 96–108)
CO2 SERPL-SCNC: 29 MMOL/L — SIGNIFICANT CHANGE UP (ref 22–31)
CREAT SERPL-MCNC: 1.14 MG/DL — SIGNIFICANT CHANGE UP (ref 0.5–1.3)
EOSINOPHIL # BLD AUTO: 0.01 K/UL — SIGNIFICANT CHANGE UP (ref 0–0.5)
EOSINOPHIL NFR BLD AUTO: 0.1 % — SIGNIFICANT CHANGE UP (ref 0–6)
GLUCOSE SERPL-MCNC: 79 MG/DL — SIGNIFICANT CHANGE UP (ref 70–99)
HCT VFR BLD CALC: 39 % — SIGNIFICANT CHANGE UP (ref 34.5–45)
HGB BLD-MCNC: 13 G/DL — SIGNIFICANT CHANGE UP (ref 11.5–15.5)
IMM GRANULOCYTES NFR BLD AUTO: 0.8 % — SIGNIFICANT CHANGE UP (ref 0–1.5)
LYMPHOCYTES # BLD AUTO: 1.81 K/UL — SIGNIFICANT CHANGE UP (ref 1–3.3)
LYMPHOCYTES # BLD AUTO: 20.6 % — SIGNIFICANT CHANGE UP (ref 13–44)
MCHC RBC-ENTMCNC: 31.9 PG — SIGNIFICANT CHANGE UP (ref 27–34)
MCHC RBC-ENTMCNC: 33.3 GM/DL — SIGNIFICANT CHANGE UP (ref 32–36)
MCV RBC AUTO: 95.8 FL — SIGNIFICANT CHANGE UP (ref 80–100)
MONOCYTES # BLD AUTO: 0.73 K/UL — SIGNIFICANT CHANGE UP (ref 0–0.9)
MONOCYTES NFR BLD AUTO: 8.3 % — SIGNIFICANT CHANGE UP (ref 2–14)
NEUTROPHILS # BLD AUTO: 6.06 K/UL — SIGNIFICANT CHANGE UP (ref 1.8–7.4)
NEUTROPHILS NFR BLD AUTO: 69.2 % — SIGNIFICANT CHANGE UP (ref 43–77)
NRBC # BLD: 0 /100 WBCS — SIGNIFICANT CHANGE UP (ref 0–0)
PLATELET # BLD AUTO: 437 K/UL — HIGH (ref 150–400)
POTASSIUM SERPL-MCNC: 4.7 MMOL/L — SIGNIFICANT CHANGE UP (ref 3.5–5.3)
POTASSIUM SERPL-SCNC: 4.7 MMOL/L — SIGNIFICANT CHANGE UP (ref 3.5–5.3)
PROT SERPL-MCNC: 7.1 G/DL — SIGNIFICANT CHANGE UP (ref 6–8.3)
RBC # BLD: 4.07 M/UL — SIGNIFICANT CHANGE UP (ref 3.8–5.2)
RBC # FLD: 11.7 % — SIGNIFICANT CHANGE UP (ref 10.3–14.5)
SODIUM SERPL-SCNC: 140 MMOL/L — SIGNIFICANT CHANGE UP (ref 135–145)
WBC # BLD: 8.77 K/UL — SIGNIFICANT CHANGE UP (ref 3.8–10.5)
WBC # FLD AUTO: 8.77 K/UL — SIGNIFICANT CHANGE UP (ref 3.8–10.5)

## 2021-03-04 DIAGNOSIS — R11.2 NAUSEA WITH VOMITING, UNSPECIFIED: ICD-10-CM

## 2021-03-04 DIAGNOSIS — Z51.11 ENCOUNTER FOR ANTINEOPLASTIC CHEMOTHERAPY: ICD-10-CM

## 2021-03-09 ENCOUNTER — RESULT REVIEW (OUTPATIENT)
Age: 76
End: 2021-03-09

## 2021-03-09 ENCOUNTER — APPOINTMENT (OUTPATIENT)
Age: 76
End: 2021-03-09
Payer: MEDICARE

## 2021-03-09 ENCOUNTER — LABORATORY RESULT (OUTPATIENT)
Age: 76
End: 2021-03-09

## 2021-03-09 VITALS
SYSTOLIC BLOOD PRESSURE: 124 MMHG | WEIGHT: 134.2 LBS | HEART RATE: 85 BPM | BODY MASS INDEX: 25.36 KG/M2 | TEMPERATURE: 97.2 F | DIASTOLIC BLOOD PRESSURE: 72 MMHG

## 2021-03-09 DIAGNOSIS — E53.8 DEFICIENCY OF OTHER SPECIFIED B GROUP VITAMINS: ICD-10-CM

## 2021-03-09 LAB
ALBUMIN SERPL ELPH-MCNC: 4.5 G/DL — SIGNIFICANT CHANGE UP (ref 3.3–5)
ALP SERPL-CCNC: 101 U/L — SIGNIFICANT CHANGE UP (ref 40–120)
ALT FLD-CCNC: 21 U/L — SIGNIFICANT CHANGE UP (ref 10–45)
ANION GAP SERPL CALC-SCNC: 10 MMOL/L — SIGNIFICANT CHANGE UP (ref 5–17)
AST SERPL-CCNC: 22 U/L — SIGNIFICANT CHANGE UP (ref 10–40)
BASOPHILS # BLD AUTO: 0.03 K/UL — SIGNIFICANT CHANGE UP (ref 0–0.2)
BASOPHILS NFR BLD AUTO: 2 % — SIGNIFICANT CHANGE UP (ref 0–2)
BILIRUB SERPL-MCNC: 0.4 MG/DL — SIGNIFICANT CHANGE UP (ref 0.2–1.2)
BUN SERPL-MCNC: 18 MG/DL — SIGNIFICANT CHANGE UP (ref 7–23)
CALCIUM SERPL-MCNC: 10.1 MG/DL — SIGNIFICANT CHANGE UP (ref 8.4–10.5)
CHLORIDE SERPL-SCNC: 101 MMOL/L — SIGNIFICANT CHANGE UP (ref 96–108)
CO2 SERPL-SCNC: 27 MMOL/L — SIGNIFICANT CHANGE UP (ref 22–31)
CREAT SERPL-MCNC: 0.91 MG/DL — SIGNIFICANT CHANGE UP (ref 0.5–1.3)
EOSINOPHIL # BLD AUTO: 0.06 K/UL — SIGNIFICANT CHANGE UP (ref 0–0.5)
EOSINOPHIL NFR BLD AUTO: 4 % — SIGNIFICANT CHANGE UP (ref 0–6)
GIANT PLATELETS BLD QL SMEAR: PRESENT — SIGNIFICANT CHANGE UP
GLUCOSE SERPL-MCNC: 96 MG/DL — SIGNIFICANT CHANGE UP (ref 70–99)
HCT VFR BLD CALC: 34.9 % — SIGNIFICANT CHANGE UP (ref 34.5–45)
HGB BLD-MCNC: 11.8 G/DL — SIGNIFICANT CHANGE UP (ref 11.5–15.5)
LG PLATELETS BLD QL AUTO: SLIGHT — SIGNIFICANT CHANGE UP
LYMPHOCYTES # BLD AUTO: 0.46 K/UL — LOW (ref 1–3.3)
LYMPHOCYTES # BLD AUTO: 33 % — SIGNIFICANT CHANGE UP (ref 13–44)
MCHC RBC-ENTMCNC: 31.8 PG — SIGNIFICANT CHANGE UP (ref 27–34)
MCHC RBC-ENTMCNC: 33.8 GM/DL — SIGNIFICANT CHANGE UP (ref 32–36)
MCV RBC AUTO: 94.1 FL — SIGNIFICANT CHANGE UP (ref 80–100)
MONOCYTES # BLD AUTO: 0.03 K/UL — SIGNIFICANT CHANGE UP (ref 0–0.9)
MONOCYTES NFR BLD AUTO: 2 % — SIGNIFICANT CHANGE UP (ref 2–14)
MYELOCYTES NFR BLD: 1 % — HIGH (ref 0–0)
NEUTROPHILS # BLD AUTO: 0.7 K/UL — LOW (ref 1.8–7.4)
NEUTROPHILS NFR BLD AUTO: 50 % — SIGNIFICANT CHANGE UP (ref 43–77)
NRBC # BLD: 0 /100 — SIGNIFICANT CHANGE UP (ref 0–0)
NRBC # BLD: SIGNIFICANT CHANGE UP /100 WBCS (ref 0–0)
PLAT MORPH BLD: NORMAL — SIGNIFICANT CHANGE UP
PLATELET # BLD AUTO: 250 K/UL — SIGNIFICANT CHANGE UP (ref 150–400)
POTASSIUM SERPL-MCNC: 4.8 MMOL/L — SIGNIFICANT CHANGE UP (ref 3.5–5.3)
POTASSIUM SERPL-SCNC: 4.8 MMOL/L — SIGNIFICANT CHANGE UP (ref 3.5–5.3)
PROT SERPL-MCNC: 6.8 G/DL — SIGNIFICANT CHANGE UP (ref 6–8.3)
RBC # BLD: 3.71 M/UL — LOW (ref 3.8–5.2)
RBC # FLD: 11.9 % — SIGNIFICANT CHANGE UP (ref 10.3–14.5)
RBC BLD AUTO: NORMAL — SIGNIFICANT CHANGE UP
SODIUM SERPL-SCNC: 138 MMOL/L — SIGNIFICANT CHANGE UP (ref 135–145)
VARIANT LYMPHS # BLD: 8 % — HIGH (ref 0–6)
WBC # BLD: 1.4 K/UL — LOW (ref 3.8–10.5)
WBC # FLD AUTO: 1.4 K/UL — LOW (ref 3.8–10.5)

## 2021-03-09 PROCEDURE — 99214 OFFICE O/P EST MOD 30 MIN: CPT

## 2021-03-09 RX ORDER — DESVENLAFAXINE 100 MG/1
100 TABLET, EXTENDED RELEASE ORAL
Refills: 0 | Status: ACTIVE | COMMUNITY

## 2021-03-09 NOTE — RESULTS/DATA
[FreeTextEntry1] : WBC 1.4    ANC: 0.7   Hgb: 11.8  HCT: 34.9   MCV: 94.1  Plts: 250\par CMP: pending

## 2021-03-09 NOTE — PHYSICAL EXAM
[Normal] : affect appropriate [de-identified] : anicteric [de-identified] : no rashes, hands very dry with some fissures on finger tips. Some RA changes, no obvious nail changes at present.

## 2021-03-09 NOTE — ASSESSMENT
[FreeTextEntry1] : Patient is a 76 y.o.,Germline genetic testing negative, with bilateral metachronous breast cancers: a remote hx of a left breast cancer in 2010 s/p lumpectomy and XRT, and now an ER+, HER2- left sided BC s/p right nipple sparing mastectomy with expander reconstruction for stage IIB, prognostic IIA disease.\par 2/9/21 - Started on adjuvant chemo with Taxotere/Cytoxan x 6. \par Today she is D8 C#2. \par Overall tolerating as expected. \par Plan: \par 1. Low B12 - Patient was advised to start on OTC B12 SL - will help prevent neuropathies and anemia. \par 2. Diarrhea - Told may be from chemo and that may occur next cycle.  Reminded can take Imodium PRN.  If worse to call office.  Keep hydrated. \par 3. Neutropenia - Neutropenic precautions reinforced.  \par 4. Pre/Post meds - to continue with Singulair pre-chemo.  I am still concerned with risk of fluid retention due to lack of steroids after chemo - often can see swelling of fingertips and nail lifting.  None observed yet.  Patient told this may happen -reassured if did it will be reversible.  \par 5. Onc - C#3 due on 3/23/21. \par

## 2021-03-21 ENCOUNTER — OUTPATIENT (OUTPATIENT)
Dept: OUTPATIENT SERVICES | Facility: HOSPITAL | Age: 76
LOS: 1 days | Discharge: HOME | End: 2021-03-21

## 2021-03-21 DIAGNOSIS — Z90.710 ACQUIRED ABSENCE OF BOTH CERVIX AND UTERUS: Chronic | ICD-10-CM

## 2021-03-21 DIAGNOSIS — Z95.5 PRESENCE OF CORONARY ANGIOPLASTY IMPLANT AND GRAFT: Chronic | ICD-10-CM

## 2021-03-21 DIAGNOSIS — Z98.890 OTHER SPECIFIED POSTPROCEDURAL STATES: Chronic | ICD-10-CM

## 2021-03-21 DIAGNOSIS — C50.919 MALIGNANT NEOPLASM OF UNSPECIFIED SITE OF UNSPECIFIED FEMALE BREAST: ICD-10-CM

## 2021-03-23 ENCOUNTER — APPOINTMENT (OUTPATIENT)
Age: 76
End: 2021-03-23

## 2021-03-23 ENCOUNTER — RESULT REVIEW (OUTPATIENT)
Age: 76
End: 2021-03-23

## 2021-03-23 VITALS
WEIGHT: 136.8 LBS | TEMPERATURE: 97.7 F | SYSTOLIC BLOOD PRESSURE: 122 MMHG | HEART RATE: 79 BPM | BODY MASS INDEX: 25.85 KG/M2 | DIASTOLIC BLOOD PRESSURE: 76 MMHG

## 2021-03-24 DIAGNOSIS — R11.2 NAUSEA WITH VOMITING, UNSPECIFIED: ICD-10-CM

## 2021-03-24 DIAGNOSIS — Z51.11 ENCOUNTER FOR ANTINEOPLASTIC CHEMOTHERAPY: ICD-10-CM

## 2021-03-26 ENCOUNTER — APPOINTMENT (OUTPATIENT)
Dept: RADIOLOGY | Facility: CLINIC | Age: 76
End: 2021-03-26
Payer: MEDICARE

## 2021-03-26 ENCOUNTER — OUTPATIENT (OUTPATIENT)
Dept: OUTPATIENT SERVICES | Facility: HOSPITAL | Age: 76
LOS: 1 days | End: 2021-03-26
Payer: MEDICARE

## 2021-03-26 ENCOUNTER — NON-APPOINTMENT (OUTPATIENT)
Age: 76
End: 2021-03-26

## 2021-03-26 DIAGNOSIS — K21.9 GASTRO-ESOPHAGEAL REFLUX DISEASE W/OUT ESOPHAGITIS: ICD-10-CM

## 2021-03-26 DIAGNOSIS — Z95.5 PRESENCE OF CORONARY ANGIOPLASTY IMPLANT AND GRAFT: Chronic | ICD-10-CM

## 2021-03-26 DIAGNOSIS — R05 COUGH: ICD-10-CM

## 2021-03-26 DIAGNOSIS — Z90.710 ACQUIRED ABSENCE OF BOTH CERVIX AND UTERUS: Chronic | ICD-10-CM

## 2021-03-26 DIAGNOSIS — Z98.890 OTHER SPECIFIED POSTPROCEDURAL STATES: Chronic | ICD-10-CM

## 2021-03-26 PROCEDURE — 71046 X-RAY EXAM CHEST 2 VIEWS: CPT | Mod: 26

## 2021-03-26 PROCEDURE — 71046 X-RAY EXAM CHEST 2 VIEWS: CPT

## 2021-03-30 ENCOUNTER — RESULT REVIEW (OUTPATIENT)
Age: 76
End: 2021-03-30

## 2021-03-30 ENCOUNTER — APPOINTMENT (OUTPATIENT)
Age: 76
End: 2021-03-30
Payer: MEDICARE

## 2021-03-30 VITALS
HEIGHT: 61 IN | TEMPERATURE: 97.8 F | HEART RATE: 97 BPM | SYSTOLIC BLOOD PRESSURE: 94 MMHG | RESPIRATION RATE: 16 BRPM | BODY MASS INDEX: 25.68 KG/M2 | DIASTOLIC BLOOD PRESSURE: 57 MMHG | WEIGHT: 136 LBS

## 2021-03-30 LAB
ALBUMIN SERPL ELPH-MCNC: 4 G/DL — SIGNIFICANT CHANGE UP (ref 3.3–5)
ALP SERPL-CCNC: 81 U/L — SIGNIFICANT CHANGE UP (ref 40–120)
ALT FLD-CCNC: 14 U/L — SIGNIFICANT CHANGE UP (ref 10–45)
ANION GAP SERPL CALC-SCNC: 11 MMOL/L — SIGNIFICANT CHANGE UP (ref 5–17)
ANISOCYTOSIS BLD QL: SLIGHT — SIGNIFICANT CHANGE UP
AST SERPL-CCNC: 22 U/L — SIGNIFICANT CHANGE UP (ref 10–40)
BASOPHILS # BLD AUTO: 0.01 K/UL — SIGNIFICANT CHANGE UP (ref 0–0.2)
BASOPHILS NFR BLD AUTO: 1 % — SIGNIFICANT CHANGE UP (ref 0–2)
BILIRUB SERPL-MCNC: 0.2 MG/DL — SIGNIFICANT CHANGE UP (ref 0.2–1.2)
BUN SERPL-MCNC: 17 MG/DL — SIGNIFICANT CHANGE UP (ref 7–23)
CALCIUM SERPL-MCNC: 9.7 MG/DL — SIGNIFICANT CHANGE UP (ref 8.4–10.5)
CHLORIDE SERPL-SCNC: 106 MMOL/L — SIGNIFICANT CHANGE UP (ref 96–108)
CO2 SERPL-SCNC: 26 MMOL/L — SIGNIFICANT CHANGE UP (ref 22–31)
CREAT SERPL-MCNC: 1 MG/DL — SIGNIFICANT CHANGE UP (ref 0.5–1.3)
EOSINOPHIL # BLD AUTO: 0.03 K/UL — SIGNIFICANT CHANGE UP (ref 0–0.5)
EOSINOPHIL NFR BLD AUTO: 2 % — SIGNIFICANT CHANGE UP (ref 0–6)
GLUCOSE SERPL-MCNC: 102 MG/DL — HIGH (ref 70–99)
HCT VFR BLD CALC: 34.1 % — LOW (ref 34.5–45)
HGB BLD-MCNC: 11.1 G/DL — LOW (ref 11.5–15.5)
LG PLATELETS BLD QL AUTO: SLIGHT — SIGNIFICANT CHANGE UP
LYMPHOCYTES # BLD AUTO: 0.59 K/UL — LOW (ref 1–3.3)
LYMPHOCYTES # BLD AUTO: 42 % — SIGNIFICANT CHANGE UP (ref 13–44)
MCHC RBC-ENTMCNC: 30.8 PG — SIGNIFICANT CHANGE UP (ref 27–34)
MCHC RBC-ENTMCNC: 32.6 GM/DL — SIGNIFICANT CHANGE UP (ref 32–36)
MCV RBC AUTO: 94.7 FL — SIGNIFICANT CHANGE UP (ref 80–100)
MONOCYTES # BLD AUTO: 0.04 K/UL — SIGNIFICANT CHANGE UP (ref 0–0.9)
MONOCYTES NFR BLD AUTO: 3 % — SIGNIFICANT CHANGE UP (ref 2–14)
NEUTROPHILS # BLD AUTO: 0.68 K/UL — LOW (ref 1.8–7.4)
NEUTROPHILS NFR BLD AUTO: 48 % — SIGNIFICANT CHANGE UP (ref 43–77)
NRBC # BLD: 0 /100 — SIGNIFICANT CHANGE UP (ref 0–0)
NRBC # BLD: SIGNIFICANT CHANGE UP /100 WBCS (ref 0–0)
PLAT MORPH BLD: NORMAL — SIGNIFICANT CHANGE UP
PLATELET # BLD AUTO: 244 K/UL — SIGNIFICANT CHANGE UP (ref 150–400)
POTASSIUM SERPL-MCNC: 4.7 MMOL/L — SIGNIFICANT CHANGE UP (ref 3.5–5.3)
POTASSIUM SERPL-SCNC: 4.7 MMOL/L — SIGNIFICANT CHANGE UP (ref 3.5–5.3)
PROT SERPL-MCNC: 6 G/DL — SIGNIFICANT CHANGE UP (ref 6–8.3)
RBC # BLD: 3.6 M/UL — LOW (ref 3.8–5.2)
RBC # FLD: 12.8 % — SIGNIFICANT CHANGE UP (ref 10.3–14.5)
RBC BLD AUTO: NORMAL — SIGNIFICANT CHANGE UP
SODIUM SERPL-SCNC: 143 MMOL/L — SIGNIFICANT CHANGE UP (ref 135–145)
SPHEROCYTES BLD QL SMEAR: SLIGHT — SIGNIFICANT CHANGE UP
VARIANT LYMPHS # BLD: 4 % — SIGNIFICANT CHANGE UP (ref 0–6)
WBC # BLD: 1.41 K/UL — LOW (ref 3.8–10.5)
WBC # FLD AUTO: 1.41 K/UL — LOW (ref 3.8–10.5)

## 2021-03-30 PROCEDURE — 99214 OFFICE O/P EST MOD 30 MIN: CPT

## 2021-03-30 NOTE — HISTORY OF PRESENT ILLNESS
[Disease: _____________________] : Disease: [unfilled] [de-identified] : 76 F with past medical history of hypercholesterolemia, s/p hysterectomy, hearing impairement, diagnosed with ER/AK positive, Her 2 not-amplified infiltrating ductal carcinoma right breast in October 2020.\par \par CASE SYNOPSIS:\par  10/7/20- mammogram: New architectural distortion central inferior right breast; 2 biopsy markers in the right breast.\par \par  10/7/20- breast US: 1.5 x 1.3 x 0.8 cm heterogeneous mass at 7:00 N6\par \par  10/27/20 - US guided core biopsy R breast; (7:00 N6 winged shaped clip) ;pathology c/w  infiltrating ductal carcinoma, moderately differentiated. No LVI. ER 90%, AK -40%, HER2 - negative.\par \par  11/10/20 - breast MRI : scattered enhancing nonspecific foci. There is an irregular enhancing mass in the lower outer right breast ( Seq 81499, Im 44 ) posteriorly measuring 2.1 x 1.6 x 2.0 cm which correlates to the newly diagnosed infiltrating ductal cancer. There is a focal nonmass enhancement in the lower central right breast measuring approximately 2 cm and located approximately 2 cm anterior to the known cancer ( Seq 82393, Im 53 ) for which further evaluation with targeted ultrasound is recommended and ultrasound-guided core biopsy for correlation is identified. If no correlate is seen, then MRI guided core biopsy is recommended. The right nipple is rotated far medially and there is underlying enhancement in the nipple areolar complex complex. This may be artifactual; however, further evaluation with targeted ultrasound is recommended to exclude underlying mass. 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 [FreeTextEntry1] : Adjuvant systemic chemotherapy [de-identified] : Ms. LÓPEZ received last cycle (#3) of systemic chemotherapy with docetaxel/cyclophosphamide; experienced chemotherapy-induced neutropenia for which she received G-CSF.  Benadryl dose decreased and she has not been exposed to steroids given her history of steroid induced psychosis.  Complains of occasional loose stools, for which she is using Imodium, and skin dryness, but no paresthesias.  She is also very somnolent 48 hours post chemotherapy.  So far no evidence of fluid retention, despite lack of premedication with steroids.  She received Moderna Covid vaccine  Reported no side effects from vaccination.  Patient is fatigued a few days post treatment, then her symptoms improved.Laboratory tests today consistent with neutropenia.

## 2021-03-30 NOTE — REVIEW OF SYSTEMS
[Fatigue] : fatigue [Abdominal Pain] : abdominal pain [Negative] : Psychiatric [Fever] : no fever [Chills] : no chills [Recent Change In Weight] : ~T no recent weight change [Vomiting] : no vomiting [Constipation] : no constipation [Skin Rash] : no skin rash [de-identified] : alopecia; skin dryness

## 2021-03-30 NOTE — PHYSICAL EXAM
[Restricted in physically strenuous activity but ambulatory and able to carry out work of a light or sedentary nature] : Status 1- Restricted in physically strenuous activity but ambulatory and able to carry out work of a light or sedentary nature, e.g., light house work, office work [Normal] : normoactive bowel sounds, soft and nontender, no hepatosplenomegaly or masses appreciated [de-identified] : Status post right nipple sparing mastectomy with tissue expander reconstruction; horizontal scar healing. Right side draining catheter in place- site clean.  No palpable masses left breast. [de-identified] : Status post right sentinel lymph node biopsy [de-identified] : dry skin

## 2021-03-31 DIAGNOSIS — C50.911 MALIGNANT NEOPLASM OF UNSPECIFIED SITE OF RIGHT FEMALE BREAST: ICD-10-CM

## 2021-04-13 ENCOUNTER — RESULT REVIEW (OUTPATIENT)
Age: 76
End: 2021-04-13

## 2021-04-13 ENCOUNTER — APPOINTMENT (OUTPATIENT)
Age: 76
End: 2021-04-13

## 2021-04-13 VITALS
WEIGHT: 136.1 LBS | SYSTOLIC BLOOD PRESSURE: 125 MMHG | HEART RATE: 74 BPM | TEMPERATURE: 97.6 F | BODY MASS INDEX: 25.72 KG/M2 | DIASTOLIC BLOOD PRESSURE: 64 MMHG

## 2021-04-19 ENCOUNTER — APPOINTMENT (OUTPATIENT)
Dept: ULTRASOUND IMAGING | Facility: CLINIC | Age: 76
End: 2021-04-19
Payer: MEDICARE

## 2021-04-19 ENCOUNTER — OUTPATIENT (OUTPATIENT)
Dept: OUTPATIENT SERVICES | Facility: HOSPITAL | Age: 76
LOS: 1 days | End: 2021-04-19
Payer: MEDICARE

## 2021-04-19 ENCOUNTER — NON-APPOINTMENT (OUTPATIENT)
Age: 76
End: 2021-04-19

## 2021-04-19 ENCOUNTER — APPOINTMENT (OUTPATIENT)
Dept: BREAST CENTER | Facility: CLINIC | Age: 76
End: 2021-04-19
Payer: MEDICARE

## 2021-04-19 VITALS
WEIGHT: 136 LBS | DIASTOLIC BLOOD PRESSURE: 60 MMHG | SYSTOLIC BLOOD PRESSURE: 98 MMHG | HEART RATE: 87 BPM | BODY MASS INDEX: 25.68 KG/M2 | HEIGHT: 61 IN

## 2021-04-19 DIAGNOSIS — Z90.710 ACQUIRED ABSENCE OF BOTH CERVIX AND UTERUS: Chronic | ICD-10-CM

## 2021-04-19 DIAGNOSIS — Z98.890 OTHER SPECIFIED POSTPROCEDURAL STATES: Chronic | ICD-10-CM

## 2021-04-19 DIAGNOSIS — M79.89 OTHER SPECIFIED SOFT TISSUE DISORDERS: ICD-10-CM

## 2021-04-19 DIAGNOSIS — Z95.5 PRESENCE OF CORONARY ANGIOPLASTY IMPLANT AND GRAFT: Chronic | ICD-10-CM

## 2021-04-19 PROCEDURE — 93971 EXTREMITY STUDY: CPT | Mod: 26,RT

## 2021-04-19 PROCEDURE — 99213 OFFICE O/P EST LOW 20 MIN: CPT

## 2021-04-19 PROCEDURE — 93971 EXTREMITY STUDY: CPT

## 2021-04-19 NOTE — HISTORY OF PRESENT ILLNESS
[FreeTextEntry1] : 76 year old female  underwent a right nipple sparing mastectomy with tissue expander reconstruction, sentinel node biopsy and completion axillary node dissection in January 2021 presents with complaints of tightness/swelling  of her right upper extremity.  She is currently receiving adjuvant chemotherapy.

## 2021-04-19 NOTE — DATA REVIEWED
[FreeTextEntry1] : Mammogram:   10/07/20     Findings:Architectural distortion central inferior right bresat which is new. \par 2 biopsy markers in the right breast.    \par \par Right breast ultrasound:  10/7/20  Findings:  1.5 x 1.3 x 0.8 cm heterogeneous mass at 7:00 N6 \par \par US guided core biopsy right breast (7:00 N6 winged shaped clip) : 10/27/20  PATHOLOGY:  Infiltrating ductal carcinoma, moderately differentiated. No LVI.  ER - - 90%,  NM -40%, HER2 - negative. \par \par Breast MRI:  11/10/2020    Findings:  There are scattered enhancing nonspecific foci. There is an irregular enhancing mass in the lower outer right breast ( Seq 85072, Im 44 ) posteriorly measuring 2.1 x 1.6 x 2.0 cm which correlates to the newly diagnosed infiltrating ductal cancer. There is a focal nonmass enhancement in the lower central right breast measuring approximately 2 cm and located approximately 2 cm anterior to the known cancer ( Seq 96752, Im 53 ) for which further evaluation with targeted ultrasound is recommended and ultrasound-guided core biopsy for correlation is identified. If no correlate is seen, then MRI guided core biopsy is recommended. The right nipple is rotated far medially and there is underlying enhancement in the nipple areolar complex complex. This may be artifactual; however, further evaluation with targeted ultrasound is recommended to exclude underlying mass. There is a right 2- 3 mm focus of enhancement in the upper slightly outer right breast ( Seq 15152, Im 91) for which MRI guided core biopsy is recommended.\par \par LEFT BREAST:\par There are scattered enhancing nonspecific foci. There are post lumpectomy changes in the left breast far posteriorly where susceptibility artifact from clips at the lumpectomy site are noted. There is no suspicious enhancement in the left breast.\par \par \par IMPRESSION:\par \par Irregular enhancing mass in the lower outer right breast posteriorly consistent with the known newly diagnosed infiltrating cancer.\par \par 2 cm area of nonmass enhancement in the lower central right breast for which further evaluation with targeted ultrasound is recommended. If a sonographic correlate is identified if correlates are identified., ultrasound-guided core biopsy is recommended. If no correlate is identified, then further evaluation with MRI core biopsy is recommended.\par \par There is enhancement in the right nipple areolar complex which may be artifactual. However, further evaluation with targeted ultrasound to exclude an underlying lesion is recommended. If an underlying lesion is identified, then ultrasound-guided core biopsy may be performed.\par \par 2-3 mm focus of enhancement in the upper slightly outer right breast for which MRI guided core biopsy is recommended.  RECOMMENDATION:  MR guided biopsy.\par \par As above, ultrasound-guided core biopsies in the right breast are also recommended if correlates are identified.\par \par \par Targeted right 11/19/20   Findings:  At 7:00 N4 right breast there is a 15 x 9 x 6 mm focal irregular mass which c/w with region of nonmass enhancement in the lower central right breast.  There is no sonographic abnormality at the right nipple areola region.   \par \par Ultrasound guided core biopsy Right breast 7:00 N4: 11/19/20   Pathology: Infiltrating Ductal carcinoma. ( 6/9). ER - 95%, NM - 60%, HER2 - negative. \par \par SURGICAL PATHOLOGY:  1/6/21  Results:  1) Right breast - retronipple tissue - negative.  Infiltrating ductal carcinoma, 2 sites measuring 2.4 cm and 1.9 cm. Milan score 6/9.  Margins negative.  \par [place] node # 1 - 8 mm metastatic focus with extranodal extension.  Axillary lymphadenectomy - 10 negative nodes.    ER and NM positive, HER 2 negative ( for both tumors).\par \par \par \par \par \par \par \par    \par

## 2021-04-19 NOTE — PHYSICAL EXAM
[Sclera nonicteric] : sclera nonicteric [No Cervical Adenopathy] : no cervical adenopathy [No Rubs] : no pericardial rub [Examined in the supine and seated position] : examined in the supine and seated position [No Edema] : no edema [No Rashes] : no rashes [No Ulceration] : no ulceration [Normocephalic] : normocephalic [Conjunctiva pink] : conjunctiva pink [Supple] : supple [No Supraclavicular Adenopathy] : no supraclavicular adenopathy [No Thyromegaly] : no thyromegaly [Clear to Auscultation Bilat] : clear to auscultation bilaterally [No Gallops] : no gallops [Asymmetrical] : asymmetrical [Grade 2] : Ptosis Grade 2 [No dominant masses] : no dominant masses in right breast  [No dominant masses] : no dominant masses left breast [No Nipple Retraction] : no left nipple retraction [No Nipple Discharge] : no left nipple discharge [No Axillary Lymphadenopathy] : no right axillary lymphadenopathy [de-identified] : Lumpectomy scar at 1-2:00 with volume loss deformity. [de-identified] : Mastectomy flap/niipple areola complex viable.  [de-identified] : Mild swelling/erythema above the antecubital fossa.  Erythema present

## 2021-04-20 ENCOUNTER — RESULT REVIEW (OUTPATIENT)
Age: 76
End: 2021-04-20

## 2021-04-20 ENCOUNTER — APPOINTMENT (OUTPATIENT)
Dept: HEMATOLOGY ONCOLOGY | Facility: CLINIC | Age: 76
End: 2021-04-20
Payer: MEDICARE

## 2021-04-20 VITALS
RESPIRATION RATE: 16 BRPM | HEART RATE: 84 BPM | DIASTOLIC BLOOD PRESSURE: 60 MMHG | WEIGHT: 137 LBS | SYSTOLIC BLOOD PRESSURE: 103 MMHG | BODY MASS INDEX: 25.86 KG/M2 | TEMPERATURE: 97.7 F | HEIGHT: 61 IN

## 2021-04-20 DIAGNOSIS — M79.89 OTHER SPECIFIED SOFT TISSUE DISORDERS: ICD-10-CM

## 2021-04-20 DIAGNOSIS — Z79.899 OTHER LONG TERM (CURRENT) DRUG THERAPY: ICD-10-CM

## 2021-04-20 LAB
ANISOCYTOSIS BLD QL: SLIGHT — SIGNIFICANT CHANGE UP
BASOPHILS # BLD AUTO: 0.04 K/UL — SIGNIFICANT CHANGE UP (ref 0–0.2)
BASOPHILS NFR BLD AUTO: 2.2 % — HIGH (ref 0–2)
EOSINOPHIL # BLD AUTO: 0.06 K/UL — SIGNIFICANT CHANGE UP (ref 0–0.5)
EOSINOPHIL NFR BLD AUTO: 3.4 % — SIGNIFICANT CHANGE UP (ref 0–6)
HCT VFR BLD CALC: 31.8 % — LOW (ref 34.5–45)
HGB BLD-MCNC: 10 G/DL — LOW (ref 11.5–15.5)
IMM GRANULOCYTES NFR BLD AUTO: 1.1 % — SIGNIFICANT CHANGE UP (ref 0–1.5)
LYMPHOCYTES # BLD AUTO: 0.5 K/UL — LOW (ref 1–3.3)
LYMPHOCYTES # BLD AUTO: 28.1 % — SIGNIFICANT CHANGE UP (ref 13–44)
MACROCYTES BLD QL: SLIGHT — SIGNIFICANT CHANGE UP
MCHC RBC-ENTMCNC: 30.1 PG — SIGNIFICANT CHANGE UP (ref 27–34)
MCHC RBC-ENTMCNC: 31.4 GM/DL — LOW (ref 32–36)
MCV RBC AUTO: 95.8 FL — SIGNIFICANT CHANGE UP (ref 80–100)
MONOCYTES # BLD AUTO: 0.09 K/UL — SIGNIFICANT CHANGE UP (ref 0–0.9)
MONOCYTES NFR BLD AUTO: 5.1 % — SIGNIFICANT CHANGE UP (ref 2–14)
NEUTROPHILS # BLD AUTO: 1.07 K/UL — LOW (ref 1.8–7.4)
NEUTROPHILS NFR BLD AUTO: 60.1 % — SIGNIFICANT CHANGE UP (ref 43–77)
NRBC # BLD: 0 /100 WBCS — SIGNIFICANT CHANGE UP (ref 0–0)
PLAT MORPH BLD: NORMAL — SIGNIFICANT CHANGE UP
PLATELET # BLD AUTO: 235 K/UL — SIGNIFICANT CHANGE UP (ref 150–400)
RBC # BLD: 3.32 M/UL — LOW (ref 3.8–5.2)
RBC # FLD: 14.2 % — SIGNIFICANT CHANGE UP (ref 10.3–14.5)
RBC BLD AUTO: SIGNIFICANT CHANGE UP
WBC # BLD: 1.78 K/UL — LOW (ref 3.8–10.5)
WBC # FLD AUTO: 1.78 K/UL — LOW (ref 3.8–10.5)

## 2021-04-20 PROCEDURE — 99213 OFFICE O/P EST LOW 20 MIN: CPT

## 2021-04-20 RX ORDER — PNV NO.95/FERROUS FUM/FOLIC AC 28MG-0.8MG
TABLET ORAL
Refills: 0 | Status: ACTIVE | COMMUNITY

## 2021-04-20 NOTE — PHYSICAL EXAM
[Normal] : affect appropriate [de-identified] : anicteric [de-identified] : no c/c/e [de-identified] : no rashes, hands mildly dry, fissures tips of fingers resolved.  Nails with flattening, mild lifting.  . Some RA changes.

## 2021-04-20 NOTE — HISTORY OF PRESENT ILLNESS
[Disease: _____________________] : Disease: [unfilled] [T: ___] : T[unfilled] [N: ___] : N[unfilled] [AJCC Stage: ____] : AJCC Stage: [unfilled] [de-identified] : ALVAREZ LÓPEZ is a 76 y.o. with a PMH significant for HLD, NSTEMI 6/2018 -> stent, and lumbar spondylosis, who we are following for bilateral metachronous breast cancers: a remote hx of a left breast cancer in 2010 s/p lumpectomy and XRT, and now an ER+, HER2- left sided stage IIB BC, prognostic IIA.  Germline genetic testing negative.\par \par 10/7/20 - Routine mammo/Sono: New architectural distortion central inferior right breast; on sono 1.5 x 1.3 x 0.8 cm heterogeneous mass at 7:00 N6\par 10/27/20 - US guided core biopsy R breast 7:00 N6 - PATH - moderately differentiated infiltrating ductal carcinoma (6/9), no LVI.   ER 90%, ME 40%, HER2 negative.\par 11/10/20 - MRI breast - 2.1 x 1.6 x 2.0cm irregular enhancing mass in the right breast LOQ posteriorly which correlates to the newly diagnosed cancer. There is a focal NME in the lower central right breast measuring approximately 2 cm and located approximately 2 cm anterior to the known cancer. The right nipple is rotated far medially and there is underlying enhancement in the nipple areolar complex complex - ? artifactual vs. underlying mass. There is a right 2- 3 mm focus of enhancement in the upper slightly outer right breast.\par 1/6/21 - Right nipple sparing mastectomy with SLND -> ALND, expander reconstruction.  PATH - IDC x 2 sites.  Largest 2.4cm IDC (6/9) with extensive DCIS, cribriform and solid type, intermediate grade with necrosis. +LVI.  Second area was 1.9cm IDC (6 - 7/9) with focal DCIS, cribriform type with calcifications.  +LVI, +PNI.  \par 1 SLN+ (0.8cm, +IRLANDA), 10 additional LN's negative.  margins negative, but closest margin anterior 0.3cm.\par [id number] = IIB, prognostic IIA.\par 2/9/21 - Started Adjuvant chemotherapy with TC x 6.  Patient reported that she had a hx of steroid psychosis and so refused any pre/post steroids with treatment.  [de-identified] : moderately differentiated infiltrating ductal carcinoma (6/9), no LVI [de-identified] : ER 90%, IA 40%, HER2 negative\par 10/30/20 - INVITAE - negative [de-identified] : Patient not taking steroid pre/post meds due to hx of steroid psychosis.  Has been taking Singulair the night before.  \par \par ~ 4/12/21 noted R arm  felt tightness/pulling  upper part above elbow.  Not noticeably swollen.  Saw Dr. Monet 4/19/21 - US negative.  Was given Rx for antibiotics.  F/w in ~ 2 weeks.  \par Has usual fatigue but cumulative.  Now legs tired.  If walks a long time needs to use a cane.  \par Gained about 3 pounds - feels this is a lot for her.  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  Denies neuropathies. \par Some constipation alternating with diarrhea - managing. \par Completed Moderna COVID vaccine on 4/1/21. \par Patient denies any changes in her family, medical, or social history since her last visit of 3/30/21.

## 2021-04-20 NOTE — REVIEW OF SYSTEMS
[Patient Intake Form Reviewed] : Patient intake form was reviewed [Negative] : Allergic/Immunologic [Fatigue] : fatigue [Constipation] : constipation [Diarrhea] : diarrhea [Muscle Weakness] : muscle weakness [FreeTextEntry9] : legs [de-identified] : memory loss

## 2021-04-20 NOTE — RESULTS/DATA
[FreeTextEntry1] : WBC 1.78    ANC: 1.07   Hgb: 10.0  HCT: 30.1   MCV: 96  Plts: 235\par CMP: pending

## 2021-04-20 NOTE — ASSESSMENT
[FreeTextEntry1] : Patient is a 76 y.o.,Germline genetic testing negative, with bilateral metachronous breast cancers: a remote hx of a left breast cancer in 2010 s/p lumpectomy and XRT, and now an ER+, HER2- left sided BC s/p right nipple sparing mastectomy with expander reconstruction for stage IIB, prognostic IIA disease.\par 2/9/21 - Started on adjuvant chemo with Taxotere/Cytoxan x 6.  \par Hx steroid psychosis - taking without steroid premeds. \par Today she is D8 C#4. \par Overall tolerating as expected.  Having more cumulative fatigue. \par Plan: \par 1. Low B12 - Continue with OTC B12 SL - will help prevent neuropathies and anemia. \par 2. Neutropenia - Neutropenic precautions reinforced.  \par 3. Pre/Post meds - to continue with Singulair pre-chemo.  Will continue to monitor for fluid retention - has gained about 3 pounds.  \par 4. RUE swelling- Likely lymphedema.  Continue on antibiotics and f/u with Dr. Smallwood. \par 5. Onc - C#5 due on 5/4/21. PE with Dr. Colunga on 5/11/21.\par

## 2021-04-21 LAB
ALBUMIN SERPL ELPH-MCNC: 3.9 G/DL — SIGNIFICANT CHANGE UP (ref 3.3–5)
ALP SERPL-CCNC: 75 U/L — SIGNIFICANT CHANGE UP (ref 40–120)
ALT FLD-CCNC: 11 U/L — SIGNIFICANT CHANGE UP (ref 10–45)
ANION GAP SERPL CALC-SCNC: 9 MMOL/L — SIGNIFICANT CHANGE UP (ref 5–17)
AST SERPL-CCNC: 18 U/L — SIGNIFICANT CHANGE UP (ref 10–40)
BILIRUB SERPL-MCNC: 0.2 MG/DL — SIGNIFICANT CHANGE UP (ref 0.2–1.2)
BUN SERPL-MCNC: 18 MG/DL — SIGNIFICANT CHANGE UP (ref 7–23)
CALCIUM SERPL-MCNC: 9.5 MG/DL — SIGNIFICANT CHANGE UP (ref 8.4–10.5)
CHLORIDE SERPL-SCNC: 102 MMOL/L — SIGNIFICANT CHANGE UP (ref 96–108)
CO2 SERPL-SCNC: 28 MMOL/L — SIGNIFICANT CHANGE UP (ref 22–31)
CREAT SERPL-MCNC: 0.96 MG/DL — SIGNIFICANT CHANGE UP (ref 0.5–1.3)
GLUCOSE SERPL-MCNC: 83 MG/DL — SIGNIFICANT CHANGE UP (ref 70–99)
POTASSIUM SERPL-MCNC: 4.9 MMOL/L — SIGNIFICANT CHANGE UP (ref 3.5–5.3)
POTASSIUM SERPL-SCNC: 4.9 MMOL/L — SIGNIFICANT CHANGE UP (ref 3.5–5.3)
PROT SERPL-MCNC: 5.9 G/DL — LOW (ref 6–8.3)
SODIUM SERPL-SCNC: 139 MMOL/L — SIGNIFICANT CHANGE UP (ref 135–145)

## 2021-04-27 ENCOUNTER — APPOINTMENT (OUTPATIENT)
Age: 76
End: 2021-04-27

## 2021-04-30 ENCOUNTER — OUTPATIENT (OUTPATIENT)
Dept: OUTPATIENT SERVICES | Facility: HOSPITAL | Age: 76
LOS: 1 days | Discharge: HOME | End: 2021-04-30

## 2021-04-30 DIAGNOSIS — Z98.890 OTHER SPECIFIED POSTPROCEDURAL STATES: Chronic | ICD-10-CM

## 2021-04-30 DIAGNOSIS — Z90.710 ACQUIRED ABSENCE OF BOTH CERVIX AND UTERUS: Chronic | ICD-10-CM

## 2021-04-30 DIAGNOSIS — Z95.5 PRESENCE OF CORONARY ANGIOPLASTY IMPLANT AND GRAFT: Chronic | ICD-10-CM

## 2021-04-30 DIAGNOSIS — C50.911 MALIGNANT NEOPLASM OF UNSPECIFIED SITE OF RIGHT FEMALE BREAST: ICD-10-CM

## 2021-05-03 ENCOUNTER — APPOINTMENT (OUTPATIENT)
Dept: BREAST CENTER | Facility: CLINIC | Age: 76
End: 2021-05-03
Payer: MEDICARE

## 2021-05-03 VITALS
DIASTOLIC BLOOD PRESSURE: 59 MMHG | WEIGHT: 138 LBS | SYSTOLIC BLOOD PRESSURE: 94 MMHG | HEART RATE: 90 BPM | BODY MASS INDEX: 26.06 KG/M2 | HEIGHT: 61 IN

## 2021-05-03 DIAGNOSIS — Z85.3 PERSONAL HISTORY OF MALIGNANT NEOPLASM OF BREAST: ICD-10-CM

## 2021-05-03 PROCEDURE — 99213 OFFICE O/P EST LOW 20 MIN: CPT

## 2021-05-03 NOTE — PHYSICAL EXAM
[Normocephalic] : normocephalic [Sclera nonicteric] : sclera nonicteric [Conjunctiva pink] : conjunctiva pink [Supple] : supple [No Supraclavicular Adenopathy] : no supraclavicular adenopathy [No Cervical Adenopathy] : no cervical adenopathy [No Thyromegaly] : no thyromegaly [Clear to Auscultation Bilat] : clear to auscultation bilaterally [No Gallops] : no gallops [No Rubs] : no pericardial rub [Examined in the supine and seated position] : examined in the supine and seated position [Asymmetrical] : asymmetrical [Grade 2] : Ptosis Grade 2 [No dominant masses] : no dominant masses in right breast  [No dominant masses] : no dominant masses left breast [No Nipple Retraction] : no left nipple retraction [No Nipple Discharge] : no left nipple discharge [No Edema] : no edema [No Rashes] : no rashes [No Ulceration] : no ulceration [No Axillary Lymphadenopathy] : no left axillary lymphadenopathy [de-identified] : Mastectomy flap/niipple areola complex viable.  [de-identified] : Lumpectomy scar at 1-2:00 with volume loss deformity. [de-identified] : Swelling and erythema have resolved.

## 2021-05-03 NOTE — DATA REVIEWED
[FreeTextEntry1] : Mammogram:   10/07/20     Findings:Architectural distortion central inferior right bresat which is new. \par 2 biopsy markers in the right breast.    \par \par Right breast ultrasound:  10/7/20  Findings:  1.5 x 1.3 x 0.8 cm heterogeneous mass at 7:00 N6 \par \par US guided core biopsy right breast (7:00 N6 winged shaped clip) : 10/27/20  PATHOLOGY:  Infiltrating ductal carcinoma, moderately differentiated. No LVI.  ER - - 90%,  NC -40%, HER2 - negative. \par \par Breast MRI:  11/10/2020    Findings:  There are scattered enhancing nonspecific foci. There is an irregular enhancing mass in the lower outer right breast ( Seq 05894, Im 44 ) posteriorly measuring 2.1 x 1.6 x 2.0 cm which correlates to the newly diagnosed infiltrating ductal cancer. There is a focal nonmass enhancement in the lower central right breast measuring approximately 2 cm and located approximately 2 cm anterior to the known cancer ( Seq 64702, Im 53 ) for which further evaluation with targeted ultrasound is recommended and ultrasound-guided core biopsy for correlation is identified. If no correlate is seen, then MRI guided core biopsy is recommended. The right nipple is rotated far medially and there is underlying enhancement in the nipple areolar complex complex. This may be artifactual; however, further evaluation with targeted ultrasound is recommended to exclude underlying mass. There is a right 2- 3 mm focus of enhancement in the upper slightly outer right breast ( Seq 71747, Im 91) for which MRI guided core biopsy is recommended.\par \par LEFT BREAST:\par There are scattered enhancing nonspecific foci. There are post lumpectomy changes in the left breast far posteriorly where susceptibility artifact from clips at the lumpectomy site are noted. There is no suspicious enhancement in the left breast.\par \par \par IMPRESSION:\par \par Irregular enhancing mass in the lower outer right breast posteriorly consistent with the known newly diagnosed infiltrating cancer.\par \par 2 cm area of nonmass enhancement in the lower central right breast for which further evaluation with targeted ultrasound is recommended. If a sonographic correlate is identified if correlates are identified., ultrasound-guided core biopsy is recommended. If no correlate is identified, then further evaluation with MRI core biopsy is recommended.\par \par There is enhancement in the right nipple areolar complex which may be artifactual. However, further evaluation with targeted ultrasound to exclude an underlying lesion is recommended. If an underlying lesion is identified, then ultrasound-guided core biopsy may be performed.\par \par 2-3 mm focus of enhancement in the upper slightly outer right breast for which MRI guided core biopsy is recommended.  RECOMMENDATION:  MR guided biopsy.\par \par As above, ultrasound-guided core biopsies in the right breast are also recommended if correlates are identified.\par \par \par Targeted right 11/19/20   Findings:  At 7:00 N4 right breast there is a 15 x 9 x 6 mm focal irregular mass which c/w with region of nonmass enhancement in the lower central right breast.  There is no sonographic abnormality at the right nipple areola region.   \par \par Ultrasound guided core biopsy Right breast 7:00 N4: 11/19/20   Pathology: Infiltrating Ductal carcinoma. ( 6/9). ER - 95%, NC - 60%, HER2 - negative. \par \par SURGICAL PATHOLOGY:  1/6/21  Results:  1) Right breast - retronipple tissue - negative.  Infiltrating ductal carcinoma, 2 sites measuring 2.4 cm and 1.9 cm. Milan score 6/9.  Margins negative.  \par [place] node # 1 - 8 mm metastatic focus with extranodal extension.  Axillary lymphadenectomy - 10 negative nodes.    ER and NC positive, HER 2 negative ( for both tumors).\par \par \par \par \par \par \par \par    \par

## 2021-05-03 NOTE — HISTORY OF PRESENT ILLNESS
[FreeTextEntry1] : 76 year old female  underwent a right nipple sparing mastectomy with tissue expander reconstruction, sentinel node biopsy and completion axillary node dissection in January 2021.  She is  currently receiving adjuvant chemotherapy. She was here 2 weeks ago with complaints of tightness/swelling of her right upper extremity with mild erythema.  She was placed on oral antibiotics and is here for a follow up visit.

## 2021-05-04 ENCOUNTER — RESULT REVIEW (OUTPATIENT)
Age: 76
End: 2021-05-04

## 2021-05-04 ENCOUNTER — APPOINTMENT (OUTPATIENT)
Dept: INFUSION THERAPY | Facility: CLINIC | Age: 76
End: 2021-05-04

## 2021-05-04 VITALS
BODY MASS INDEX: 25.89 KG/M2 | TEMPERATURE: 97.2 F | DIASTOLIC BLOOD PRESSURE: 69 MMHG | SYSTOLIC BLOOD PRESSURE: 120 MMHG | HEART RATE: 79 BPM | WEIGHT: 137 LBS

## 2021-05-04 LAB
BASOPHILS # BLD AUTO: 0.05 K/UL — SIGNIFICANT CHANGE UP (ref 0–0.2)
BASOPHILS NFR BLD AUTO: 0.7 % — SIGNIFICANT CHANGE UP (ref 0–2)
EOSINOPHIL # BLD AUTO: 0.04 K/UL — SIGNIFICANT CHANGE UP (ref 0–0.5)
EOSINOPHIL NFR BLD AUTO: 0.6 % — SIGNIFICANT CHANGE UP (ref 0–6)
HCT VFR BLD CALC: 35 % — SIGNIFICANT CHANGE UP (ref 34.5–45)
HGB BLD-MCNC: 11.1 G/DL — LOW (ref 11.5–15.5)
IMM GRANULOCYTES NFR BLD AUTO: 1.9 % — HIGH (ref 0–1.5)
LYMPHOCYTES # BLD AUTO: 0.84 K/UL — LOW (ref 1–3.3)
LYMPHOCYTES # BLD AUTO: 12.5 % — LOW (ref 13–44)
MCHC RBC-ENTMCNC: 29.8 PG — SIGNIFICANT CHANGE UP (ref 27–34)
MCHC RBC-ENTMCNC: 31.7 GM/DL — LOW (ref 32–36)
MCV RBC AUTO: 93.8 FL — SIGNIFICANT CHANGE UP (ref 80–100)
MONOCYTES # BLD AUTO: 0.99 K/UL — HIGH (ref 0–0.9)
MONOCYTES NFR BLD AUTO: 14.7 % — HIGH (ref 2–14)
NEUTROPHILS # BLD AUTO: 4.69 K/UL — SIGNIFICANT CHANGE UP (ref 1.8–7.4)
NEUTROPHILS NFR BLD AUTO: 69.6 % — SIGNIFICANT CHANGE UP (ref 43–77)
NRBC # BLD: 0 /100 WBCS — SIGNIFICANT CHANGE UP (ref 0–0)
PLATELET # BLD AUTO: 304 K/UL — SIGNIFICANT CHANGE UP (ref 150–400)
RBC # BLD: 3.73 M/UL — LOW (ref 3.8–5.2)
RBC # FLD: 16.4 % — HIGH (ref 10.3–14.5)
WBC # BLD: 6.74 K/UL — SIGNIFICANT CHANGE UP (ref 3.8–10.5)
WBC # FLD AUTO: 6.74 K/UL — SIGNIFICANT CHANGE UP (ref 3.8–10.5)

## 2021-05-05 DIAGNOSIS — R11.2 NAUSEA WITH VOMITING, UNSPECIFIED: ICD-10-CM

## 2021-05-05 DIAGNOSIS — Z51.11 ENCOUNTER FOR ANTINEOPLASTIC CHEMOTHERAPY: ICD-10-CM

## 2021-05-11 ENCOUNTER — APPOINTMENT (OUTPATIENT)
Dept: HEMATOLOGY ONCOLOGY | Facility: CLINIC | Age: 76
End: 2021-05-11

## 2021-05-12 ENCOUNTER — RESULT REVIEW (OUTPATIENT)
Age: 76
End: 2021-05-12

## 2021-05-12 ENCOUNTER — APPOINTMENT (OUTPATIENT)
Dept: INFUSION THERAPY | Facility: CLINIC | Age: 76
End: 2021-05-12

## 2021-05-12 ENCOUNTER — APPOINTMENT (OUTPATIENT)
Dept: HEMATOLOGY ONCOLOGY | Facility: CLINIC | Age: 76
End: 2021-05-12
Payer: MEDICARE

## 2021-05-12 ENCOUNTER — INPATIENT (INPATIENT)
Facility: HOSPITAL | Age: 76
LOS: 3 days | Discharge: HOME HEALTH | DRG: 808 | End: 2021-05-16
Attending: FAMILY MEDICINE | Admitting: INTERNAL MEDICINE
Payer: MEDICARE

## 2021-05-12 VITALS
RESPIRATION RATE: 16 BRPM | SYSTOLIC BLOOD PRESSURE: 100 MMHG | WEIGHT: 130.95 LBS | OXYGEN SATURATION: 97 % | DIASTOLIC BLOOD PRESSURE: 64 MMHG | TEMPERATURE: 98 F | HEART RATE: 79 BPM | HEIGHT: 61 IN

## 2021-05-12 VITALS
SYSTOLIC BLOOD PRESSURE: 96 MMHG | HEART RATE: 96 BPM | TEMPERATURE: 97.7 F | DIASTOLIC BLOOD PRESSURE: 61 MMHG | OXYGEN SATURATION: 96 %

## 2021-05-12 DIAGNOSIS — Z95.5 PRESENCE OF CORONARY ANGIOPLASTY IMPLANT AND GRAFT: Chronic | ICD-10-CM

## 2021-05-12 DIAGNOSIS — D70.1 AGRANULOCYTOSIS SECONDARY TO CANCER CHEMOTHERAPY: ICD-10-CM

## 2021-05-12 DIAGNOSIS — R06.00 DYSPNEA, UNSPECIFIED: ICD-10-CM

## 2021-05-12 DIAGNOSIS — Z98.890 OTHER SPECIFIED POSTPROCEDURAL STATES: Chronic | ICD-10-CM

## 2021-05-12 DIAGNOSIS — Z90.710 ACQUIRED ABSENCE OF BOTH CERVIX AND UTERUS: Chronic | ICD-10-CM

## 2021-05-12 LAB
ALBUMIN SERPL ELPH-MCNC: 2.4 G/DL — LOW (ref 3.3–5)
ALP SERPL-CCNC: 195 U/L — HIGH (ref 40–120)
ALT FLD-CCNC: 61 U/L — SIGNIFICANT CHANGE UP (ref 12–78)
ANION GAP SERPL CALC-SCNC: 6 MMOL/L — SIGNIFICANT CHANGE UP (ref 5–17)
ANISOCYTOSIS BLD QL: SIGNIFICANT CHANGE UP
AST SERPL-CCNC: 62 U/L — HIGH (ref 15–37)
BASOPHILS # BLD AUTO: 0 K/UL — SIGNIFICANT CHANGE UP (ref 0–0.2)
BASOPHILS # BLD AUTO: 0.02 K/UL — SIGNIFICANT CHANGE UP (ref 0–0.2)
BASOPHILS NFR BLD AUTO: 0 % — SIGNIFICANT CHANGE UP (ref 0–2)
BASOPHILS NFR BLD AUTO: 2 % — SIGNIFICANT CHANGE UP (ref 0–2)
BILIRUB SERPL-MCNC: 0.4 MG/DL — SIGNIFICANT CHANGE UP (ref 0.2–1.2)
BUN SERPL-MCNC: 18 MG/DL — SIGNIFICANT CHANGE UP (ref 7–23)
CALCIUM SERPL-MCNC: 7.9 MG/DL — LOW (ref 8.5–10.1)
CHLORIDE SERPL-SCNC: 100 MMOL/L — SIGNIFICANT CHANGE UP (ref 96–108)
CO2 SERPL-SCNC: 26 MMOL/L — SIGNIFICANT CHANGE UP (ref 22–31)
CREAT SERPL-MCNC: 0.94 MG/DL — SIGNIFICANT CHANGE UP (ref 0.5–1.3)
DOHLE BOD BLD QL SMEAR: PRESENT — SIGNIFICANT CHANGE UP
EOSINOPHIL # BLD AUTO: 0.03 K/UL — SIGNIFICANT CHANGE UP (ref 0–0.5)
EOSINOPHIL # BLD AUTO: 0.1 K/UL — SIGNIFICANT CHANGE UP (ref 0–0.5)
EOSINOPHIL NFR BLD AUTO: 3 % — SIGNIFICANT CHANGE UP (ref 0–6)
EOSINOPHIL NFR BLD AUTO: 9 % — HIGH (ref 0–6)
GIANT PLATELETS BLD QL SMEAR: PRESENT — SIGNIFICANT CHANGE UP
GLUCOSE SERPL-MCNC: 97 MG/DL — SIGNIFICANT CHANGE UP (ref 70–99)
HCT VFR BLD CALC: 27.3 % — LOW (ref 34.5–45)
HCT VFR BLD CALC: 30.5 % — LOW (ref 34.5–45)
HGB BLD-MCNC: 10 G/DL — LOW (ref 11.5–15.5)
HGB BLD-MCNC: 9 G/DL — LOW (ref 11.5–15.5)
LG PLATELETS BLD QL AUTO: SIGNIFICANT CHANGE UP
LIDOCAIN IGE QN: 106 U/L — SIGNIFICANT CHANGE UP (ref 73–393)
LYMPHOCYTES # BLD AUTO: 0.3 K/UL — LOW (ref 1–3.3)
LYMPHOCYTES # BLD AUTO: 0.31 K/UL — LOW (ref 1–3.3)
LYMPHOCYTES # BLD AUTO: 26 % — SIGNIFICANT CHANGE UP (ref 13–44)
LYMPHOCYTES # BLD AUTO: 29 % — SIGNIFICANT CHANGE UP (ref 13–44)
MACROCYTES BLD QL: SLIGHT — SIGNIFICANT CHANGE UP
MAGNESIUM SERPL-MCNC: 2 MG/DL — SIGNIFICANT CHANGE UP (ref 1.6–2.6)
MCHC RBC-ENTMCNC: 29.7 PG — SIGNIFICANT CHANGE UP (ref 27–34)
MCHC RBC-ENTMCNC: 29.7 PG — SIGNIFICANT CHANGE UP (ref 27–34)
MCHC RBC-ENTMCNC: 32.8 GM/DL — SIGNIFICANT CHANGE UP (ref 32–36)
MCHC RBC-ENTMCNC: 33 GM/DL — SIGNIFICANT CHANGE UP (ref 32–36)
MCV RBC AUTO: 90.1 FL — SIGNIFICANT CHANGE UP (ref 80–100)
MCV RBC AUTO: 90.5 FL — SIGNIFICANT CHANGE UP (ref 80–100)
MICROCYTES BLD QL: SLIGHT — SIGNIFICANT CHANGE UP
MONOCYTES # BLD AUTO: 0.17 K/UL — SIGNIFICANT CHANGE UP (ref 0–0.9)
MONOCYTES # BLD AUTO: 0.26 K/UL — SIGNIFICANT CHANGE UP (ref 0–0.9)
MONOCYTES NFR BLD AUTO: 16 % — HIGH (ref 2–14)
MONOCYTES NFR BLD AUTO: 22 % — HIGH (ref 2–14)
NEUTROPHILS # BLD AUTO: 0.47 K/UL — LOW (ref 1.8–7.4)
NEUTROPHILS # BLD AUTO: 0.55 K/UL — LOW (ref 1.8–7.4)
NEUTROPHILS NFR BLD AUTO: 42 % — LOW (ref 43–77)
NEUTROPHILS NFR BLD AUTO: 47 % — SIGNIFICANT CHANGE UP (ref 43–77)
NRBC # BLD: 0 /100 — SIGNIFICANT CHANGE UP (ref 0–0)
NRBC # BLD: SIGNIFICANT CHANGE UP /100 WBCS (ref 0–0)
NRBC # BLD: SIGNIFICANT CHANGE UP /100 WBCS (ref 0–0)
PLAT MORPH BLD: ABNORMAL
PLATELET # BLD AUTO: 310 K/UL — SIGNIFICANT CHANGE UP (ref 150–400)
PLATELET # BLD AUTO: 333 K/UL — SIGNIFICANT CHANGE UP (ref 150–400)
POLYCHROMASIA BLD QL SMEAR: SLIGHT — SIGNIFICANT CHANGE UP
POTASSIUM SERPL-MCNC: 3.5 MMOL/L — SIGNIFICANT CHANGE UP (ref 3.5–5.3)
POTASSIUM SERPL-SCNC: 3.5 MMOL/L — SIGNIFICANT CHANGE UP (ref 3.5–5.3)
PROT SERPL-MCNC: 5.9 GM/DL — LOW (ref 6–8.3)
RAPID RVP RESULT: SIGNIFICANT CHANGE UP
RBC # BLD: 3.03 M/UL — LOW (ref 3.8–5.2)
RBC # BLD: 3.37 M/UL — LOW (ref 3.8–5.2)
RBC # FLD: 16.3 % — HIGH (ref 10.3–14.5)
RBC # FLD: 16.3 % — HIGH (ref 10.3–14.5)
RBC BLD AUTO: ABNORMAL
SARS-COV-2 RNA SPEC QL NAA+PROBE: SIGNIFICANT CHANGE UP
SODIUM SERPL-SCNC: 132 MMOL/L — LOW (ref 135–145)
VARIANT LYMPHS # BLD: 2 % — SIGNIFICANT CHANGE UP (ref 0–6)
WBC # BLD: 1.06 K/UL — CRITICAL LOW (ref 3.8–10.5)
WBC # BLD: 1.16 K/UL — LOW (ref 3.8–10.5)
WBC # FLD AUTO: 1.06 K/UL — CRITICAL LOW (ref 3.8–10.5)
WBC # FLD AUTO: 1.16 K/UL — LOW (ref 3.8–10.5)

## 2021-05-12 PROCEDURE — 99285 EMERGENCY DEPT VISIT HI MDM: CPT | Mod: CS

## 2021-05-12 PROCEDURE — 93306 TTE W/DOPPLER COMPLETE: CPT

## 2021-05-12 PROCEDURE — 80053 COMPREHEN METABOLIC PANEL: CPT

## 2021-05-12 PROCEDURE — 81001 URINALYSIS AUTO W/SCOPE: CPT

## 2021-05-12 PROCEDURE — 36415 COLL VENOUS BLD VENIPUNCTURE: CPT

## 2021-05-12 PROCEDURE — 86803 HEPATITIS C AB TEST: CPT

## 2021-05-12 PROCEDURE — 87086 URINE CULTURE/COLONY COUNT: CPT

## 2021-05-12 PROCEDURE — 97530 THERAPEUTIC ACTIVITIES: CPT | Mod: GP

## 2021-05-12 PROCEDURE — 83880 ASSAY OF NATRIURETIC PEPTIDE: CPT

## 2021-05-12 PROCEDURE — 86769 SARS-COV-2 COVID-19 ANTIBODY: CPT

## 2021-05-12 PROCEDURE — 85610 PROTHROMBIN TIME: CPT

## 2021-05-12 PROCEDURE — 71275 CT ANGIOGRAPHY CHEST: CPT | Mod: 26,MA

## 2021-05-12 PROCEDURE — 84100 ASSAY OF PHOSPHORUS: CPT

## 2021-05-12 PROCEDURE — 82746 ASSAY OF FOLIC ACID SERUM: CPT

## 2021-05-12 PROCEDURE — 84484 ASSAY OF TROPONIN QUANT: CPT

## 2021-05-12 PROCEDURE — 97116 GAIT TRAINING THERAPY: CPT | Mod: GP

## 2021-05-12 PROCEDURE — 71045 X-RAY EXAM CHEST 1 VIEW: CPT | Mod: 26

## 2021-05-12 PROCEDURE — 85730 THROMBOPLASTIN TIME PARTIAL: CPT

## 2021-05-12 PROCEDURE — 97163 PT EVAL HIGH COMPLEX 45 MIN: CPT | Mod: GP

## 2021-05-12 PROCEDURE — 82728 ASSAY OF FERRITIN: CPT

## 2021-05-12 PROCEDURE — 83735 ASSAY OF MAGNESIUM: CPT

## 2021-05-12 PROCEDURE — 85045 AUTOMATED RETICULOCYTE COUNT: CPT

## 2021-05-12 PROCEDURE — 83540 ASSAY OF IRON: CPT

## 2021-05-12 PROCEDURE — 94760 N-INVAS EAR/PLS OXIMETRY 1: CPT

## 2021-05-12 PROCEDURE — 85025 COMPLETE CBC W/AUTO DIFF WBC: CPT

## 2021-05-12 PROCEDURE — 93010 ELECTROCARDIOGRAM REPORT: CPT

## 2021-05-12 PROCEDURE — 99215 OFFICE O/P EST HI 40 MIN: CPT

## 2021-05-12 PROCEDURE — 93971 EXTREMITY STUDY: CPT | Mod: RT

## 2021-05-12 PROCEDURE — 82607 VITAMIN B-12: CPT

## 2021-05-12 PROCEDURE — 83550 IRON BINDING TEST: CPT

## 2021-05-12 PROCEDURE — 80061 LIPID PANEL: CPT

## 2021-05-12 PROCEDURE — 99223 1ST HOSP IP/OBS HIGH 75: CPT

## 2021-05-12 PROCEDURE — 82550 ASSAY OF CK (CPK): CPT

## 2021-05-12 PROCEDURE — 36600 WITHDRAWAL OF ARTERIAL BLOOD: CPT

## 2021-05-12 PROCEDURE — 82803 BLOOD GASES ANY COMBINATION: CPT

## 2021-05-12 PROCEDURE — 83690 ASSAY OF LIPASE: CPT

## 2021-05-12 PROCEDURE — 71045 X-RAY EXAM CHEST 1 VIEW: CPT

## 2021-05-12 RX ORDER — TRAZODONE HCL 50 MG
300 TABLET ORAL
Qty: 0 | Refills: 0 | DISCHARGE

## 2021-05-12 RX ORDER — TRAZODONE HCL 50 MG
300 TABLET ORAL ONCE
Refills: 0 | Status: COMPLETED | OUTPATIENT
Start: 2021-05-12 | End: 2021-05-12

## 2021-05-12 RX ORDER — SODIUM CHLORIDE 9 MG/ML
1000 INJECTION INTRAMUSCULAR; INTRAVENOUS; SUBCUTANEOUS ONCE
Refills: 0 | Status: COMPLETED | OUTPATIENT
Start: 2021-05-12 | End: 2021-05-12

## 2021-05-12 RX ADMIN — SODIUM CHLORIDE 1000 MILLILITER(S): 9 INJECTION INTRAMUSCULAR; INTRAVENOUS; SUBCUTANEOUS at 18:05

## 2021-05-12 RX ADMIN — Medication 300 MILLIGRAM(S): at 22:54

## 2021-05-12 RX ADMIN — SODIUM CHLORIDE 1000 MILLILITER(S): 9 INJECTION INTRAMUSCULAR; INTRAVENOUS; SUBCUTANEOUS at 19:05

## 2021-05-12 NOTE — ED ADULT NURSE NOTE - NSIMPLEMENTINTERV_GEN_ALL_ED
Implemented All Fall Risk Interventions:  [place] to call system. Call bell, personal items and telephone within reach. Instruct patient to call for assistance. Room bathroom lighting operational. Non-slip footwear when patient is off stretcher. Physically safe environment: no spills, clutter or unnecessary equipment. Stretcher in lowest position, wheels locked, appropriate side rails in place. Provide visual cue, wrist band, yellow gown, etc. Monitor gait and stability. Monitor for mental status changes and reorient to person, place, and time. Review medications for side effects contributing to fall risk. Reinforce activity limits and safety measures with patient and family.

## 2021-05-12 NOTE — ASSESSMENT
[FreeTextEntry1] : Patient is a 76 y.o.,Germline genetic testing negative, with bilateral metachronous breast cancers: a remote hx of a left breast cancer in 2010 s/p lumpectomy and XRT, and now an ER+, HER2- left sided BC s/p right nipple sparing mastectomy with expander reconstruction for stage IIB, prognostic IIA disease.\par 2/9/21 - Started on adjuvant chemo with Taxotere/Cytoxan x 6.  \par Hx steroid psychosis - taking without steroid premeds. \par Today she is D9  C#5  \par Overall had been tolerating as expected, now for past few days not eating and extreme SOB. \par On exam lung sounds clear but RR elevated at 22, ? use of accessory muscles?\par No fevers but face slightly flushed. \par Patient currently living alone - her son is away in [facility] until tomorrow.  She cannot walk from chair to bathroom without assistance. \par Patient was seen along with Dr. Colunga\par Plan: \par It is currently unsafe for patient to go home - at risk for falls at the least. \par Need to r/o PE +/- pneumonia (in setting of neutropenia).   \par D/W her need to go to the hospital for emergent testing.  She initially refused, stating that she was too tired to do this, and that she wanted to wait for her son to come home tomorrow.  Spoke with son Sajan who then called his mother and convinced her to go to ER.  Ambulance was called to take her there directly from our office.  \par Dr. Colunga called ER to give report. \par C#6/6 was due on 5/25/21 - will defer for now.  \par

## 2021-05-12 NOTE — H&P ADULT - NSHPREVIEWOFSYSTEMS_GEN_ALL_CORE
Gen: + malaise, fatigue, weight loss. Negative for fevers, chills, or weight gain  Eyes: no blurred vision or lacrimation  ENT: no tinnitus or vertigo  Resp: no wheezing, dyspnea at rest, pleuritic chest pain, or hemoptysis  CV: + CHO. No chest pain or palpitations  GI: no nausea, vomiting, abdominal pain, diarrhea, constipation, melena, or hematochezia  : no dysuria or hematuria  MSK: no arthralgias or joint swelling  Neuro: + weakness. No focal deficits, confusion, dizziness, tremors, or seizures  Skin: no rash, lesions, or edema

## 2021-05-12 NOTE — ED PROVIDER NOTE - PROGRESS NOTE DETAILS
discussed results with pt and her son.  only sig finding so far is neutropenia with ANC  0.03.  pt should be admitted for further workup considering her profound weakness and neutropenia.  pt agrees to admission.

## 2021-05-12 NOTE — HISTORY OF PRESENT ILLNESS
[Disease: _____________________] : Disease: [unfilled] [T: ___] : T[unfilled] [N: ___] : N[unfilled] [AJCC Stage: ____] : AJCC Stage: [unfilled] [de-identified] : ALVAREZ LÓPEZ is a 76 y.o. with a PMH significant for HLD, NSTEMI 6/2018 -> stent, and lumbar spondylosis, who we are following for bilateral metachronous breast cancers: a remote hx of a left breast cancer in 2010 s/p lumpectomy and XRT, and now an ER+, HER2- left sided stage IIB BC, prognostic IIA.  Germline genetic testing negative.\par \par 10/7/20 - Presented with abnormality in right breast on routine mammo/sono.  Biopsy ER/WI +, HER2 neg IDC. \par  1/6/21 - Right nipple sparing mastectomy with SLND -> ALND, expander reconstruction.  PATH - IDC x 2 sites.  Largest 2.4cm IDC (6/9) with extensive DCIS, cribriform and solid type, intermediate grade with necrosis. +LVI.  Second area was 1.9cm IDC (6 - 7/9) with focal DCIS, cribriform type with calcifications.  +LVI, +PNI.  \par 1 SLN+ (0.8cm, +IRLANDA), 10 additional LN's negative.  margins negative, but closest margin anterior 0.3cm.\par [id number] = IIB, prognostic IIA.\par 2/9/21 - Started Adjuvant chemotherapy with TC x 6.  Patient reported that she had a hx of steroid psychosis and so refused any pre/post steroids with treatment. \par ~ 4/12/21 noted R arm  felt tightness/pulling  upper part above elbow.  Not noticeably swollen.  Saw Dr. Smallwood 4/19/21 - US negative.  Was given Rx for antibiotics.  Resolved so was felt to be a cellulitis.  [de-identified] : moderately differentiated infiltrating ductal carcinoma (6/9), no LVI [de-identified] : ER 90%, DC 40%, HER2 negative\par 10/30/20 - INVITAE - negative [de-identified] : Patient was supposed to come for PE yest but was "not feeling good enough" to come.  \par Today son called to report that in addition to feeling weak she was also extremely SOB.  Patient came in for hydration and evaluation. \par \par She reports that she has not been eating for the past few days.  No N/V, just no appetite.  Also no significant fluid intake either.  Denies fevers or s/s infection - no rashes, dysuria, or diarrhea.  She states she is constipated but that is because she has not had a BM in a few days (but has not eaten either).  \par Her major complaint is SOB with minimal exertion.  She can't walk even a few steps without having dyspnea.  She knows this started a few days ago but cannot be more specific than that - too tired to elaborate more.  \par \par These symptoms are new - she did not have anything like this with her prior chemo's. \par \par Was given 1.5L in the office today, O2 sat 96%, RR 22. Had some chills during infusion but no temperature even when taken 30 minutes after.  BP slightly low at 96/61 but she has been there before.  \par  \par Patient denies any changes in her family, medical, or social history since her last visit of 3/30/21.

## 2021-05-12 NOTE — ED PROVIDER NOTE - PMH
Breast cancer  left breast- lumpectomy and radiation - CURRENTLY RIGHT BREAST IDC  Depression    Hiatal hernia    History of coronary artery disease    Lumbar radiculopathy    Lumbar spondylosis

## 2021-05-12 NOTE — ED ADULT NURSE REASSESSMENT NOTE - NS ED NURSE REASSESS COMMENT FT1
Spoke with patient and patient son Sajan  (with patient's permission) regarding plan of care. Patient pending bed and orders at this time. Pt refused straight cath for urine, Dr Carney made aware. Pt resting comfortably in bed at this time. Questions addressed, needs addressed, pt resting comfortably in bed at this time.

## 2021-05-12 NOTE — H&P ADULT - NSHPPHYSICALEXAM_GEN_ALL_CORE
Vital Signs Last 24 Hrs  T(C): 36.4 (12 May 2021 17:26), Max: 36.6 (12 May 2021 16:30)  T(F): 97.6 (12 May 2021 17:26), Max: 97.8 (12 May 2021 16:30)  HR: 84 (12 May 2021 19:37) (77 - 96)  BP: 102/55 (12 May 2021 19:37) (87/51 - 102/55)  BP(mean): 69 (12 May 2021 19:37) (69 - 69)  RR: 15 (12 May 2021 19:37) (15 - 16)  SpO2: 100% (12 May 2021 19:37) (97% - 100%) Vital Signs Last 24 Hrs  T(C): 36.4 (12 May 2021 17:26), Max: 36.6 (12 May 2021 16:30)  T(F): 97.6 (12 May 2021 17:26), Max: 97.8 (12 May 2021 16:30)  HR: 84 (12 May 2021 19:37) (77 - 96)  BP: 102/55 (12 May 2021 19:37) (87/51 - 102/55)  BP(mean): 69 (12 May 2021 19:37) (69 - 69)  RR: 15 (12 May 2021 19:37) (15 - 16)  SpO2: 100% (12 May 2021 19:37) (97% - 100%)    GENERAL: No acute distress but fatigued  HEENT: PERRL, EOMI, MM dry, no oropharyngeal lesions  NECK: supple, no stiffness, no JVD, no thyromegaly  PULM: respirations non-labored, clear to auscultation bilaterally, no rales, rhonchi, or wheezes  CV: regular rate and rhythm, no murmurs, gallops, or rubs  GI: abdomen soft, nontender, nondistended, no masses felt, normal bowel sounds  : PureWick in place  MSK: strength 5/5 bilateral upper and lower extremities except 4.5/5 strength knee flexion bilaterally. No joint swelling, erythema, or warmth  LYMPH: no anterior cervical, posterior cervical, supraclavicular, or inguinal lymphadenopathy  NEURO: A&Ox3, no tremors, sensation intact  SKIN: no rashes, lesions, or edema Vital Signs Last 24 Hrs  T(C): 36.4 (12 May 2021 17:26), Max: 36.6 (12 May 2021 16:30)  T(F): 97.6 (12 May 2021 17:26), Max: 97.8 (12 May 2021 16:30)  HR: 84 (12 May 2021 19:37) (77 - 96)  BP: 102/55 (12 May 2021 19:37) (87/51 - 102/55)  BP(mean): 69 (12 May 2021 19:37) (69 - 69)  RR: 15 (12 May 2021 19:37) (15 - 16)  SpO2: 100% (12 May 2021 19:37) (97% - 100%)    GENERAL: No acute distress but fatigued  HEENT: PERRL, EOMI, MM dry, no oropharyngeal lesions  NECK: supple, no stiffness, no JVD, no thyromegaly  PULM: respirations non-labored, clear to auscultation bilaterally, no rales, rhonchi, or wheezes  CV: regular rate and rhythm, no murmurs, gallops, or rubs  BREAST: s/p right nipple-sparing mastectomy with scarring/firmness, no erythema, warmth, or TTP in either breast  GI: abdomen soft, nontender, nondistended, no masses felt, normal bowel sounds  : PureWick in place  MSK: strength 5/5 bilateral upper and lower extremities except 4.5/5 strength knee flexion bilaterally. No joint swelling, erythema, or warmth  LYMPH: no anterior cervical, posterior cervical, supraclavicular, or inguinal lymphadenopathy  NEURO: A&Ox3, no tremors, sensation intact  SKIN: no rashes, lesions, or edema

## 2021-05-12 NOTE — H&P ADULT - ASSESSMENT
75 yo F with a PMH CAD s/p 2 stents,, depression, HLD, breast cancer s/p RT and lumpectomy (on chemotherapy), and insomnia who presents with weakness. 75 yo F with a PMH CAD s/p stent, depression, HLD, breast cancer s/p RT/mastectomy/lumpectomy (on chemotherapy), and insomnia who presents with weakness.    1) Failure to Thrive in Adult  - Likely due to chemotherapy, last dose 5/14  - Low suspicion of acute infection, no specific pain  - With worsening anemia/leukopenia  - COIVID-19 PCR result negative  - F/u vitals, trend for fevers  - Check UA    2/3) Acute on chronic anemia/Leukopenia  - ________________ 77 yo F with a PMH CAD s/p stent, depression, HLD, breast cancer s/p RT/mastectomy/lumpectomy (on chemotherapy), and insomnia who presents with weakness.    1/2/3) Failure to Thrive in Adult/Acute on chronic anemia/Neutropenia  - Likely due to chemotherapy, last dose 5/14  - Low suspicion of acute infection, no specific pain, hold off on antibiotics for now  - With worsening anemia/leukopenia,   - COIVID-19 PCR result negative  - F/u vitals, trend for fevers  - Check UA to r/o UTI  - Check iron studies, ferritin, B12, folate, reticulocyte count  - Check fecal occult  - Hematology/Oncology consult  - IVFs, encourage PO intake  - PT eval    4) Stage II breast cancer  - S/p right mastectomy  - On chemotherapy last dose 5/4, next planned for 5/25  - Supportive therapy, including PPI, anti-emetics  - Hematology/Oncology consult    5) Acute urinary retention  - Was not urinating on PureWick  - Bladder scan showed 750 ml; now s/p straight cath  - Recheck TOV, if unable to, will place [place]  - Renal function intact  - IVFs    6) Depression  - C/w Desvenlafaxine 100 mg QD  - C/w Trazodone 300 mg QHS    7) HLD  - C/w Atorvastatin equivalent dose of Rosuvastatin 40 mg QD and aspirin 81 mg QD    8) Prophylactic measure  - DVT PPX: IMPROVE score of 3, will give Lovenox 40 mg SQ QD  - Diet: DASH  - Dispo: pending improvement in sxs

## 2021-05-12 NOTE — H&P ADULT - NSHPSOCIALHISTORY_GEN_ALL_CORE
Smoked a pack per day x many years, stopped ?20 years ago.  No significant alcohol or drug use.  Her son lives with her.  Not employed.

## 2021-05-12 NOTE — ED ADULT TRIAGE NOTE - CHIEF COMPLAINT QUOTE
PT BIBEMS from chemo center/infusion center, as per EMS sent for dehydration and R/O PNA due to pt being increasingly SOB, fever, weakness and "requiring more assistance than usual".

## 2021-05-12 NOTE — REASON FOR VISIT
[Follow-Up Visit] : a follow-up [Urgent Visit] : an urgent  [FreeTextEntry2] : Breast Cancer - Adjuvant TC C5 D9 - Dehydration, Dyspnea on minimal exertion

## 2021-05-12 NOTE — H&P ADULT - HISTORY OF PRESENT ILLNESS
75 yo F with a PMH CAD s/p 2 stents,, depression, HLD, breast cancer s/p RT and lumpectomy (on chemotherapy), and insomnia who presents with weakness.    In the ED, she was given Trazodone 300 mg PO x1 and NS 1L x1. 75 yo F with a PMH CAD s/p stent, depression, HLD, breast cancer s/p RT/mastectomy/lumpectomy (on chemotherapy), and insomnia who presents with weakness. She has been on chemotherapy every 21 days for the past few months, last one on 5/4 (9 days ago). She had tolerated chemotherapy but a couple days after the last treatment, she developed severe weakness. She has since had tremendous difficulty with walking or other minimal exertion. She denies dizziness while walking, fevers, chills, cough, chest pain, abdominal pain, nausea, vomiting, diarrhea, blood in her stool or urine, dysuria, or rash/swelling. She states her next (and so far last) treatment was scheduled for 5/25.  Her breast cancer, according to paperwork, is stage IIB.    In the ED, she was given Trazodone 300 mg PO x1 and NS 1L x1.

## 2021-05-12 NOTE — ED ADULT NURSE NOTE - OBJECTIVE STATEMENT
Pt brought in by ambulance c/o weakness increasing over last 7 days. Pt alert and oriented x4, pt states is on IV chemo for breast cancer. Pt denies chest pain, dizziness, vomiting, diarrhea. Cardiac monitoring in progress, EKG done, safety maintained..

## 2021-05-12 NOTE — ED PROVIDER NOTE - OBJECTIVE STATEMENT
77 y/o female with PMHx of breast CA s/p RT s/p lumpectomy currently on chemotherapy (last treatment 5/4/21), CAD s/p x2 stents, lumbar spondylosis, hiatal hernia, depression presents to the ED c/o weakness, progressively worsening over the past week. Associated SOB, decreased appetite over the past x3 days. Denies fever, nausea, vomiting, diarrhea, chest pain. Pt went to her oncologist's office today for fluids, was told to come to ED for further evaluation. Oncologist: Dr. Colunga.

## 2021-05-12 NOTE — ED PROVIDER NOTE - CARE PLAN
Principal Discharge DX:	Chemotherapy-induced neutropenia  Secondary Diagnosis:	Weakness  Secondary Diagnosis:	Poor appetite  Secondary Diagnosis:	CHO (dyspnea on exertion)

## 2021-05-12 NOTE — PHYSICAL EXAM
[Normal] : grossly intact [de-identified] : anicteric; faces slightly flushed [de-identified] : RLL initially dull, cleared after several deep breaths, tachypneic, ? use of accessory muscles? [de-identified] : no c/c/e; no port - peripheral access [de-identified] : Nails with flattening, mild lifting.    Some RA changes. [de-identified] : Patient started to become teary (son is her support system and he is away until tomorrow).  Also mir fatigued that she can't handle.

## 2021-05-12 NOTE — RESULTS/DATA
[FreeTextEntry1] : WBC 1.16   ANC: 0.55   Hgb: 10.0  HCT: 30.5   MCV: 90.5  Plts: 333\par CMP: pending

## 2021-05-12 NOTE — ED ADULT NURSE NOTE - CHPI ED NUR SYMPTOMS NEG
no chest pain/no chills/no cough/no diaphoresis/no edema/no fever/no headache/no hemoptysis/no wheezing

## 2021-05-12 NOTE — H&P ADULT - NSHPLABSRESULTS_GEN_ALL_CORE
Labs personally reviewed and interpreted. Notable for leukopenia (WBC 1.16, recently 6.7) with  (decreased to 470 on repeat), lymphopenia (0.30), and 2% bands. Hb 10.0 (baseline ~ 11) which decreased to 9 on repeat, plt 333, Na low at 132, K 3.5, Cl 100, HCO3 26, BUN/creatinine 18/0.94, BG 97, calcium 7.9 with albumin 2.4 (corrected calcium normal at 9.1), Mg 2.0, alk phos elevated at 195, AST elevated at 62, ALT normal at 61, and lipase normal at 106.  RVP/COVID-19 PCR result negative.    CXR personally reviewed and interpreted. Notable for right breast swelling. Lung fields appear clear without focal consolidations, effusions, interstitial markings, pneumothorax, or obvious cardiomegaly.  CTA chest personally reviewed and interpreted. Notable for:  LUNGS AND AIRWAYS: Patent central airways.  Mild emphysematous changes. Biapical scarring, greater on the right. Atelectatic changes.  PLEURA: Small right pleural effusion.  MEDIASTINUM AND TASHIA: No lymphadenopathy.  VESSELS: Within normal limits. No pulmonary emboli visualized.  HEART: Heart size is normal. No pericardial effusion.  CHEST WALL AND LOWER NECK: Status post right mastectomy and axillary node dissection. Prominent soft tissue adjacent to the right axillary clips. This may represent postsurgical changes although no active neoplasm cannot be excluded. Please correlate clinically. Right breast prosthesis with tissue expander. Overlying skin thickening and subcutaneous edema. Small fluid collection along the lateral inferior aspect.  VISUALIZED UPPER ABDOMEN: 2.8 cm cyst in the upper pole of the right kidney.  BONES: Minimal degenerative changes.    IMPRESSION:  No pulmonary emboli. The graft small right pleural effusion. Mild COPD. Biapical scarring.  Prominent soft tissue adjacent to the right axillary clips. This may represent postsurgical changes although tumor cannot be excluded. Small fluid adjacent to the outer inferior aspect of the right breast prosthesis.    EKG ___________ Labs personally reviewed and interpreted. Notable for leukopenia (WBC 1.16, recently 6.7) with  (decreased to 470 on repeat), lymphopenia (0.30), and 2% bands. Hb 10.0 (baseline ~ 11) which decreased to 9 on repeat, plt 333, Na low at 132, K 3.5, Cl 100, HCO3 26, BUN/creatinine 18/0.94, BG 97, calcium 7.9 with albumin 2.4 (corrected calcium normal at 9.1), Mg 2.0, alk phos elevated at 195, AST elevated at 62, ALT normal at 61, and lipase normal at 106.  RVP/COVID-19 PCR result negative.    CXR personally reviewed and interpreted. Notable for right breast swelling. Lung fields appear clear without focal consolidations, effusions, interstitial markings, pneumothorax, or obvious cardiomegaly.  CTA chest personally reviewed and interpreted. Notable for:  LUNGS AND AIRWAYS: Patent central airways.  Mild emphysematous changes. Biapical scarring, greater on the right. Atelectatic changes.  PLEURA: Small right pleural effusion.  MEDIASTINUM AND TASHIA: No lymphadenopathy.  VESSELS: Within normal limits. No pulmonary emboli visualized.  HEART: Heart size is normal. No pericardial effusion.  CHEST WALL AND LOWER NECK: Status post right mastectomy and axillary node dissection. Prominent soft tissue adjacent to the right axillary clips. This may represent postsurgical changes although no active neoplasm cannot be excluded. Please correlate clinically. Right breast prosthesis with tissue expander. Overlying skin thickening and subcutaneous edema. Small fluid collection along the lateral inferior aspect.  VISUALIZED UPPER ABDOMEN: 2.8 cm cyst in the upper pole of the right kidney.  BONES: Minimal degenerative changes.    IMPRESSION:  No pulmonary emboli. The graft small right pleural effusion. Mild COPD. Biapical scarring.  Prominent soft tissue adjacent to the right axillary clips. This may represent postsurgical changes although tumor cannot be excluded. Small fluid adjacent to the outer inferior aspect of the right breast prosthesis.    EKG personally reviewed. Normal sinus rhythm, normal axis. No pathological Q waves or ST changes. Rate 75, , QTc 428.

## 2021-05-12 NOTE — H&P ADULT - NSICDXPASTSURGICALHX_GEN_ALL_CORE_FT
PAST SURGICAL HISTORY:  H/O heart artery stent     H/O lumpectomy     H/O: hysterectomy      PAST SURGICAL HISTORY:  H/O heart artery stent     H/O left mastectomy     H/O lumpectomy     H/O: hysterectomy

## 2021-05-13 DIAGNOSIS — E86.0 DEHYDRATION: ICD-10-CM

## 2021-05-13 DIAGNOSIS — Z90.12 ACQUIRED ABSENCE OF LEFT BREAST AND NIPPLE: Chronic | ICD-10-CM

## 2021-05-13 DIAGNOSIS — Z79.899 OTHER LONG TERM (CURRENT) DRUG THERAPY: ICD-10-CM

## 2021-05-13 DIAGNOSIS — R06.00 DYSPNEA, UNSPECIFIED: ICD-10-CM

## 2021-05-13 DIAGNOSIS — D70.1 AGRANULOCYTOSIS SECONDARY TO CANCER CHEMOTHERAPY: ICD-10-CM

## 2021-05-13 LAB
ADD ON TEST-SPECIMEN IN LAB: SIGNIFICANT CHANGE UP
ALBUMIN SERPL ELPH-MCNC: 2.1 G/DL — LOW (ref 3.3–5)
ALBUMIN SERPL ELPH-MCNC: 3.5 G/DL — SIGNIFICANT CHANGE UP (ref 3.3–5)
ALP SERPL-CCNC: 166 U/L — HIGH (ref 40–120)
ALP SERPL-CCNC: 234 U/L — HIGH (ref 40–120)
ALT FLD-CCNC: 58 U/L — HIGH (ref 10–45)
ALT FLD-CCNC: 69 U/L — SIGNIFICANT CHANGE UP (ref 12–78)
ANION GAP SERPL CALC-SCNC: 16 MMOL/L — SIGNIFICANT CHANGE UP (ref 5–17)
ANION GAP SERPL CALC-SCNC: 7 MMOL/L — SIGNIFICANT CHANGE UP (ref 5–17)
APPEARANCE UR: ABNORMAL
APTT BLD: 22.6 SEC — LOW (ref 27.5–35.5)
AST SERPL-CCNC: 72 U/L — HIGH (ref 10–40)
AST SERPL-CCNC: 89 U/L — HIGH (ref 15–37)
BASOPHILS # BLD AUTO: 0.01 K/UL — SIGNIFICANT CHANGE UP (ref 0–0.2)
BASOPHILS NFR BLD AUTO: 1 % — SIGNIFICANT CHANGE UP (ref 0–2)
BILIRUB SERPL-MCNC: 0.3 MG/DL — SIGNIFICANT CHANGE UP (ref 0.2–1.2)
BILIRUB SERPL-MCNC: 0.4 MG/DL — SIGNIFICANT CHANGE UP (ref 0.2–1.2)
BILIRUB UR-MCNC: NEGATIVE — SIGNIFICANT CHANGE UP
BUN SERPL-MCNC: 16 MG/DL — SIGNIFICANT CHANGE UP (ref 7–23)
BUN SERPL-MCNC: 17 MG/DL — SIGNIFICANT CHANGE UP (ref 7–23)
CALCIUM SERPL-MCNC: 8 MG/DL — LOW (ref 8.5–10.1)
CALCIUM SERPL-MCNC: 9.2 MG/DL — SIGNIFICANT CHANGE UP (ref 8.4–10.5)
CHLORIDE SERPL-SCNC: 104 MMOL/L — SIGNIFICANT CHANGE UP (ref 96–108)
CHLORIDE SERPL-SCNC: 93 MMOL/L — LOW (ref 96–108)
CO2 SERPL-SCNC: 22 MMOL/L — SIGNIFICANT CHANGE UP (ref 22–31)
CO2 SERPL-SCNC: 23 MMOL/L — SIGNIFICANT CHANGE UP (ref 22–31)
COLOR SPEC: YELLOW — SIGNIFICANT CHANGE UP
COVID-19 SPIKE DOMAIN AB INTERP: POSITIVE
COVID-19 SPIKE DOMAIN ANTIBODY RESULT: >250 U/ML — HIGH
CREAT SERPL-MCNC: 0.77 MG/DL — SIGNIFICANT CHANGE UP (ref 0.5–1.3)
CREAT SERPL-MCNC: 1.09 MG/DL — SIGNIFICANT CHANGE UP (ref 0.5–1.3)
CULTURE RESULTS: NO GROWTH — SIGNIFICANT CHANGE UP
DIFF PNL FLD: ABNORMAL
EOSINOPHIL # BLD AUTO: 0.16 K/UL — SIGNIFICANT CHANGE UP (ref 0–0.5)
EOSINOPHIL NFR BLD AUTO: 15 % — HIGH (ref 0–6)
FERRITIN SERPL-MCNC: 906 NG/ML — HIGH (ref 15–150)
FOLATE SERPL-MCNC: 13.7 NG/ML — SIGNIFICANT CHANGE UP
GLUCOSE SERPL-MCNC: 105 MG/DL — HIGH (ref 70–99)
GLUCOSE SERPL-MCNC: 93 MG/DL — SIGNIFICANT CHANGE UP (ref 70–99)
GLUCOSE UR QL: NEGATIVE MG/DL — SIGNIFICANT CHANGE UP
HCT VFR BLD CALC: 25.4 % — LOW (ref 34.5–45)
HCV AB S/CO SERPL IA: 0.08 S/CO — SIGNIFICANT CHANGE UP (ref 0–0.99)
HCV AB SERPL-IMP: SIGNIFICANT CHANGE UP
HGB BLD-MCNC: 8.3 G/DL — LOW (ref 11.5–15.5)
INR BLD: 0.96 RATIO — SIGNIFICANT CHANGE UP (ref 0.88–1.16)
IRON SATN MFR SERPL: 16 % — SIGNIFICANT CHANGE UP (ref 14–50)
IRON SATN MFR SERPL: 30 UG/DL — SIGNIFICANT CHANGE UP (ref 30–160)
KETONES UR-MCNC: NEGATIVE — SIGNIFICANT CHANGE UP
LEUKOCYTE ESTERASE UR-ACNC: NEGATIVE — SIGNIFICANT CHANGE UP
LYMPHOCYTES # BLD AUTO: 0.39 K/UL — LOW (ref 1–3.3)
LYMPHOCYTES # BLD AUTO: 37 % — SIGNIFICANT CHANGE UP (ref 13–44)
MAGNESIUM SERPL-MCNC: 2 MG/DL — SIGNIFICANT CHANGE UP (ref 1.6–2.6)
MCHC RBC-ENTMCNC: 29.5 PG — SIGNIFICANT CHANGE UP (ref 27–34)
MCHC RBC-ENTMCNC: 32.7 GM/DL — SIGNIFICANT CHANGE UP (ref 32–36)
MCV RBC AUTO: 90.4 FL — SIGNIFICANT CHANGE UP (ref 80–100)
MONOCYTES # BLD AUTO: 0.18 K/UL — SIGNIFICANT CHANGE UP (ref 0–0.9)
MONOCYTES NFR BLD AUTO: 17 % — HIGH (ref 2–14)
NEUTROPHILS # BLD AUTO: 0.32 K/UL — LOW (ref 1.8–7.4)
NEUTROPHILS NFR BLD AUTO: 30 % — LOW (ref 43–77)
NITRITE UR-MCNC: NEGATIVE — SIGNIFICANT CHANGE UP
NRBC # BLD: SIGNIFICANT CHANGE UP /100 WBCS (ref 0–0)
PH UR: 5 — SIGNIFICANT CHANGE UP (ref 5–8)
PHOSPHATE SERPL-MCNC: 2.4 MG/DL — LOW (ref 2.5–4.5)
PLATELET # BLD AUTO: 316 K/UL — SIGNIFICANT CHANGE UP (ref 150–400)
POTASSIUM SERPL-MCNC: 3.6 MMOL/L — SIGNIFICANT CHANGE UP (ref 3.5–5.3)
POTASSIUM SERPL-MCNC: 4.4 MMOL/L — SIGNIFICANT CHANGE UP (ref 3.5–5.3)
POTASSIUM SERPL-SCNC: 3.6 MMOL/L — SIGNIFICANT CHANGE UP (ref 3.5–5.3)
POTASSIUM SERPL-SCNC: 4.4 MMOL/L — SIGNIFICANT CHANGE UP (ref 3.5–5.3)
PROT SERPL-MCNC: 5.3 GM/DL — LOW (ref 6–8.3)
PROT SERPL-MCNC: 6.2 G/DL — SIGNIFICANT CHANGE UP (ref 6–8.3)
PROT UR-MCNC: 15 MG/DL
PROTHROM AB SERPL-ACNC: 11.2 SEC — SIGNIFICANT CHANGE UP (ref 10.6–13.6)
RBC # BLD: 2.81 M/UL — LOW (ref 3.8–5.2)
RBC # BLD: 2.81 M/UL — LOW (ref 3.8–5.2)
RBC # FLD: 16.4 % — HIGH (ref 10.3–14.5)
RETICS #: 36.5 K/UL — SIGNIFICANT CHANGE UP (ref 25–125)
RETICS/RBC NFR: 1.3 % — SIGNIFICANT CHANGE UP (ref 0.5–2.5)
SARS-COV-2 IGG+IGM SERPL QL IA: >250 U/ML — HIGH
SARS-COV-2 IGG+IGM SERPL QL IA: POSITIVE
SODIUM SERPL-SCNC: 132 MMOL/L — LOW (ref 135–145)
SODIUM SERPL-SCNC: 134 MMOL/L — LOW (ref 135–145)
SP GR SPEC: 1.01 — SIGNIFICANT CHANGE UP (ref 1.01–1.02)
SPECIMEN SOURCE: SIGNIFICANT CHANGE UP
TIBC SERPL-MCNC: 188 UG/DL — LOW (ref 220–430)
UIBC SERPL-MCNC: 158 UG/DL — SIGNIFICANT CHANGE UP (ref 110–370)
UROBILINOGEN FLD QL: NEGATIVE MG/DL — SIGNIFICANT CHANGE UP
VIT B12 SERPL-MCNC: 952 PG/ML — SIGNIFICANT CHANGE UP (ref 232–1245)
WBC # BLD: 1.05 K/UL — CRITICAL LOW (ref 3.8–10.5)
WBC # FLD AUTO: 1.05 K/UL — CRITICAL LOW (ref 3.8–10.5)

## 2021-05-13 PROCEDURE — 99222 1ST HOSP IP/OBS MODERATE 55: CPT

## 2021-05-13 PROCEDURE — 93971 EXTREMITY STUDY: CPT | Mod: 26,RT

## 2021-05-13 PROCEDURE — 99233 SBSQ HOSP IP/OBS HIGH 50: CPT

## 2021-05-13 RX ORDER — SODIUM CHLORIDE 9 MG/ML
1000 INJECTION INTRAMUSCULAR; INTRAVENOUS; SUBCUTANEOUS
Refills: 0 | Status: DISCONTINUED | OUTPATIENT
Start: 2021-05-13 | End: 2021-05-13

## 2021-05-13 RX ORDER — ONDANSETRON 8 MG/1
4 TABLET, FILM COATED ORAL EVERY 6 HOURS
Refills: 0 | Status: DISCONTINUED | OUTPATIENT
Start: 2021-05-13 | End: 2021-05-16

## 2021-05-13 RX ORDER — MULTIVIT WITH MIN/MFOLATE/K2 340-15/3 G
1 POWDER (GRAM) ORAL ONCE
Refills: 0 | Status: COMPLETED | OUTPATIENT
Start: 2021-05-13 | End: 2021-05-13

## 2021-05-13 RX ORDER — PANTOPRAZOLE SODIUM 20 MG/1
40 TABLET, DELAYED RELEASE ORAL
Refills: 0 | Status: DISCONTINUED | OUTPATIENT
Start: 2021-05-13 | End: 2021-05-16

## 2021-05-13 RX ORDER — TRAZODONE HCL 50 MG
300 TABLET ORAL AT BEDTIME
Refills: 0 | Status: DISCONTINUED | OUTPATIENT
Start: 2021-05-13 | End: 2021-05-16

## 2021-05-13 RX ORDER — ATORVASTATIN CALCIUM 80 MG/1
80 TABLET, FILM COATED ORAL AT BEDTIME
Refills: 0 | Status: DISCONTINUED | OUTPATIENT
Start: 2021-05-13 | End: 2021-05-16

## 2021-05-13 RX ORDER — CHOLECALCIFEROL (VITAMIN D3) 125 MCG
2000 CAPSULE ORAL DAILY
Refills: 0 | Status: DISCONTINUED | OUTPATIENT
Start: 2021-05-13 | End: 2021-05-16

## 2021-05-13 RX ORDER — MONTELUKAST 4 MG/1
10 TABLET, CHEWABLE ORAL DAILY
Refills: 0 | Status: DISCONTINUED | OUTPATIENT
Start: 2021-05-13 | End: 2021-05-16

## 2021-05-13 RX ORDER — ASPIRIN/CALCIUM CARB/MAGNESIUM 324 MG
81 TABLET ORAL DAILY
Refills: 0 | Status: DISCONTINUED | OUTPATIENT
Start: 2021-05-13 | End: 2021-05-16

## 2021-05-13 RX ORDER — SODIUM CHLORIDE 9 MG/ML
500 INJECTION INTRAMUSCULAR; INTRAVENOUS; SUBCUTANEOUS ONCE
Refills: 0 | Status: COMPLETED | OUTPATIENT
Start: 2021-05-13 | End: 2021-05-13

## 2021-05-13 RX ORDER — DESVENLAFAXINE 50 MG/1
100 TABLET, EXTENDED RELEASE ORAL DAILY
Refills: 0 | Status: DISCONTINUED | OUTPATIENT
Start: 2021-05-13 | End: 2021-05-16

## 2021-05-13 RX ORDER — FILGRASTIM 480MCG/1.6
300 VIAL (ML) INJECTION DAILY
Refills: 0 | Status: DISCONTINUED | OUTPATIENT
Start: 2021-05-13 | End: 2021-05-15

## 2021-05-13 RX ORDER — SODIUM CHLORIDE 9 MG/ML
1000 INJECTION INTRAMUSCULAR; INTRAVENOUS; SUBCUTANEOUS
Refills: 0 | Status: DISCONTINUED | OUTPATIENT
Start: 2021-05-13 | End: 2021-05-14

## 2021-05-13 RX ORDER — ENOXAPARIN SODIUM 100 MG/ML
40 INJECTION SUBCUTANEOUS DAILY
Refills: 0 | Status: DISCONTINUED | OUTPATIENT
Start: 2021-05-13 | End: 2021-05-16

## 2021-05-13 RX ORDER — SODIUM,POTASSIUM PHOSPHATES 278-250MG
1 POWDER IN PACKET (EA) ORAL
Refills: 0 | Status: COMPLETED | OUTPATIENT
Start: 2021-05-13 | End: 2021-05-14

## 2021-05-13 RX ADMIN — Medication 81 MILLIGRAM(S): at 10:08

## 2021-05-13 RX ADMIN — Medication 300 MICROGRAM(S): at 21:12

## 2021-05-13 RX ADMIN — Medication 1 PACKET(S): at 18:15

## 2021-05-13 RX ADMIN — SODIUM CHLORIDE 75 MILLILITER(S): 9 INJECTION INTRAMUSCULAR; INTRAVENOUS; SUBCUTANEOUS at 10:08

## 2021-05-13 RX ADMIN — PANTOPRAZOLE SODIUM 40 MILLIGRAM(S): 20 TABLET, DELAYED RELEASE ORAL at 10:08

## 2021-05-13 RX ADMIN — Medication 1 PACKET(S): at 12:27

## 2021-05-13 RX ADMIN — ENOXAPARIN SODIUM 40 MILLIGRAM(S): 100 INJECTION SUBCUTANEOUS at 10:08

## 2021-05-13 RX ADMIN — SODIUM CHLORIDE 50 MILLILITER(S): 9 INJECTION INTRAMUSCULAR; INTRAVENOUS; SUBCUTANEOUS at 23:47

## 2021-05-13 RX ADMIN — SODIUM CHLORIDE 500 MILLILITER(S): 9 INJECTION INTRAMUSCULAR; INTRAVENOUS; SUBCUTANEOUS at 21:06

## 2021-05-13 RX ADMIN — Medication 300 MILLIGRAM(S): at 21:05

## 2021-05-13 RX ADMIN — ATORVASTATIN CALCIUM 80 MILLIGRAM(S): 80 TABLET, FILM COATED ORAL at 21:04

## 2021-05-13 RX ADMIN — SODIUM CHLORIDE 75 MILLILITER(S): 9 INJECTION INTRAMUSCULAR; INTRAVENOUS; SUBCUTANEOUS at 01:41

## 2021-05-13 RX ADMIN — DESVENLAFAXINE 100 MILLIGRAM(S): 50 TABLET, EXTENDED RELEASE ORAL at 10:08

## 2021-05-13 NOTE — CONSULT NOTE ADULT - SUBJECTIVE AND OBJECTIVE BOX
HPI:  77 yo F with a PMH CAD s/p stent, depression, HLD, breast cancer s/p RT/mastectomy/lumpectomy (on chemotherapy), and insomnia who presents with weakness. She has been on chemotherapy every 21 days for the past few months, last one on 5/4 (9 days ago). She had tolerated chemotherapy but a couple days after the last treatment, she developed severe weakness. She has since had tremendous difficulty with walking or other minimal exertion. She denies dizziness while walking, fevers, chills, cough, chest pain, abdominal pain, nausea, vomiting, diarrhea, blood in her stool or urine, dysuria, or rash/swelling. She states her next (and so far last) treatment was scheduled for 5/25.  Her breast cancer, according to paperwork, is stage IIB.  In the ED, she was given Trazodone 300 mg PO x1 and NS 1L x1. (12 May 2021 23:46)    ONCOLOGY  77 y/o female hx of L breast cancer 2010 s/p lumpectomy, metachronous R breast cancer s/p R mastectomy [facility] Jan 2021 pT2, N1a, ER/OK positive HER2 negative stage II B, prognostic II A on adjuvant chemotherapy with taxotere / cytoxan every 21 days - total 6 cycles planned ( Dr Lanier) Had TC cycle 5 on 5/4/21 . After last chemo- profound weakness. No fever, no cough, no dysuria, no n/v/d. Poor po intake. Extreme fatigue with minimal exertion.  This is an unusual reaction for her- she tolerated prior chemo quite well.    PAST MEDICAL & SURGICAL HISTORY:  Depression    Hiatal hernia    Lumbar radiculopathy    Lumbar spondylosis    History of coronary artery disease  s/p stent    Breast cancer  left breast- lumpectomy and mastectomy, and radiation - CURRENTLY RIGHT BREAST IDC    HLD (hyperlipidemia)  H/O: hysterectomy  H/O heart artery stent    H/O lumpectomy    H/O left mastectomy    FAMILY HISTORY:  FH: heart disease  father    Social History:  Smoked a pack per day x many years, stopped ?20 years ago.  No significant alcohol or drug use.  Her son lives with her.  Not employed. (12 May 2021 23:46)    ROS : as above all other sx reviewed and negative     Vital Signs Last 24 Hrs  T(C): 37 (13 May 2021 05:30), Max: 37.3 (13 May 2021 01:38)  T(F): 98.6 (13 May 2021 05:30), Max: 99.1 (13 May 2021 01:38)  HR: 102 (13 May 2021 05:30) (73 - 102)  BP: 99/48 (13 May 2021 05:30) (87/51 - 111/62)  BP(mean): 69 (12 May 2021 19:37) (69 - 69)  RR: 16 (13 May 2021 05:30) (15 - 16)  SpO2: 97% (13 May 2021 05:30) (97% - 100%)    Looks very fatigued but NAD   Sclera not icteric    Oral mucosa moist  No adenopathy  R mastectomy  with expander   Lungs clear  Heart RRR  Abd soft NT BS +  Neuro non focal  Musculoskeletal generalized weakness  Psych- appears depressed                           8.3    1.05  )-----------( 316      ( 13 May 2021 06:55 )             25.4     yesterday     CTA - no PE;  no infiltrates

## 2021-05-13 NOTE — DIETITIAN INITIAL EVALUATION ADULT. - NAME AND PHONE
Lu Omer RDN, CDN, [facility]      [phone]   [email or web address]

## 2021-05-13 NOTE — DIETITIAN INITIAL EVALUATION ADULT. - ADD RECOMMEND
Record PO intake in EMR after each meal (nursing.) Encourage PO intake. Monitor PO intake, tolerance, labs and weight.

## 2021-05-13 NOTE — PHYSICAL THERAPY INITIAL EVALUATION ADULT - GAIT DEVIATIONS NOTED, PT EVAL
Suture Removal: 12 days Excision Method: Elliptical Tissue Cultured Epidermal Autograft Text: The defect edges were debeveled with a #15 scalpel blade.  Given the location of the defect, shape of the defect and the proximity to free margins a tissue cultured epidermal autograft was deemed most appropriate.  The graft was then trimmed to fit the size of the defect.  The graft was then placed in the primary defect and oriented appropriately. Bilobed Transposition Flap Text: The defect edges were debeveled with a #15 scalpel blade.  Given the location of the defect and the proximity to free margins a bilobed transposition flap was deemed most appropriate.  Using a sterile surgical marker, an appropriate bilobe flap drawn around the defect.    The area thus outlined was incised deep to adipose tissue with a #15 scalpel blade.  The skin margins were undermined to an appropriate distance in all directions utilizing iris scissors. Complex Repair And Dorsal Nasal Flap Text: The defect edges were debeveled with a #15 scalpel blade.  The primary defect was closed partially with a complex linear closure.  Given the location of the remaining defect, shape of the defect and the proximity to free margins a dorsal nasal flap was deemed most appropriate for complete closure of the defect.  Using a sterile surgical marker, an appropriate flap was drawn incorporating the defect and placing the expected incisions within the relaxed skin tension lines where possible.    The area thus outlined was incised deep to adipose tissue with a #15 scalpel blade.  The skin margins were undermined to an appropriate distance in all directions utilizing iris scissors. Posterior Auricular Interpolation Flap Text: A decision was made to reconstruct the defect utilizing an interpolation axial flap and a staged reconstruction.  A telfa template was made of the defect.  This telfa template was then used to outline the posterior auricular interpolation flap.  The donor area for the pedicle flap was then injected with anesthesia.  The flap was excised through the skin and subcutaneous tissue down to the layer of the underlying musculature.  The pedicle flap was carefully excised within this deep plane to maintain its blood supply.  The edges of the donor site were undermined.   The donor site was closed in a primary fashion.  The pedicle was then rotated into position and sutured.  Once the tube was sutured into place, adequate blood supply was confirmed with blanching and refill.  The pedicle was then wrapped with xeroform gauze and dressed appropriately with a telfa and gauze bandage to ensure continued blood supply and protect the attached pedicle. V-Y Flap Text: The defect edges were debeveled with a #15 scalpel blade.  Given the location of the defect, shape of the defect and the proximity to free margins a V-Y flap was deemed most appropriate.  Using a sterile surgical marker, an appropriate advancement flap was drawn incorporating the defect and placing the expected incisions within the relaxed skin tension lines where possible.    The area thus outlined was incised deep to adipose tissue with a #15 scalpel blade.  The skin margins were undermined to an appropriate distance in all directions utilizing iris scissors. Anesthesia Type: 1% lidocaine with epinephrine Pre-Excision Curettage Text (Leave Blank If You Do Not Want): Prior to drawing the surgical margin the visible lesion was removed with electrodesiccation and curettage to clearly define the lesion size. Consent was obtained from the patient. The risks and benefits to therapy were discussed in detail. Specifically, the risks of infection, scarring, bleeding, prolonged wound healing, incomplete removal, allergy to anesthesia, nerve injury and recurrence were addressed. Prior to the procedure, the treatment site was clearly identified and confirmed by the patient. All components of Universal Protocol/PAUSE Rule completed. Saucerization Excision Additional Text (Leave Blank If You Do Not Want): The margin was drawn around the clinically apparent lesion.  Incisions were then made along these lines, in a tangential fashion, to the appropriate tissue plane and the lesion was extirpated. Star Wedge Flap Text: The defect edges were debeveled with a #15 scalpel blade.  Given the location of the defect, shape of the defect and the proximity to free margins a star wedge flap was deemed most appropriate.  Using a sterile surgical marker, an appropriate rotation flap was drawn incorporating the defect and placing the expected incisions within the relaxed skin tension lines where possible. The area thus outlined was incised deep to adipose tissue with a #15 scalpel blade.  The skin margins were undermined to an appropriate distance in all directions utilizing iris scissors. Cheek Interpolation Flap Text: A decision was made to reconstruct the defect utilizing an interpolation axial flap and a staged reconstruction.  A telfa template was made of the defect.  This telfa template was then used to outline the Cheek Interpolation flap.  The donor area for the pedicle flap was then injected with anesthesia.  The flap was excised through the skin and subcutaneous tissue down to the layer of the underlying musculature.  The interpolation flap was carefully excised within this deep plane to maintain its blood supply.  The edges of the donor site were undermined.   The donor site was closed in a primary fashion.  The pedicle was then rotated into position and sutured.  Once the tube was sutured into place, adequate blood supply was confirmed with blanching and refill.  The pedicle was then wrapped with xeroform gauze and dressed appropriately with a telfa and gauze bandage to ensure continued blood supply and protect the attached pedicle. Complex Repair And Transposition Flap Text: The defect edges were debeveled with a #15 scalpel blade.  The primary defect was closed partially with a complex linear closure.  Given the location of the remaining defect, shape of the defect and the proximity to free margins a transposition flap was deemed most appropriate for complete closure of the defect.  Using a sterile surgical marker, an appropriate advancement flap was drawn incorporating the defect and placing the expected incisions within the relaxed skin tension lines where possible.    The area thus outlined was incised deep to adipose tissue with a #15 scalpel blade.  The skin margins were undermined to an appropriate distance in all directions utilizing iris scissors. Partial Purse String (Intermediate) Text: Given the location of the defect and the characteristics of the surrounding skin an intermediate purse string closure was deemed most appropriate.  Undermining was performed circumferentially around the surgical defect.  A purse string suture was then placed and tightened. Wound tension of the circular defect prevented complete closure of the wound. Deep Sutures: 4-0 Vicryl Island Pedicle Flap Text: The defect edges were debeveled with a #15 scalpel blade.  Given the location of the defect, shape of the defect and the proximity to free margins an island pedicle advancement flap was deemed most appropriate.  Using a sterile surgical marker, an appropriate advancement flap was drawn incorporating the defect, outlining the appropriate donor tissue and placing the expected incisions within the relaxed skin tension lines where possible.    The area thus outlined was incised deep to adipose tissue with a #15 scalpel blade.  The skin margins were undermined to an appropriate distance in all directions around the primary defect and laterally outward around the island pedicle utilizing iris scissors.  There was minimal undermining beneath the pedicle flap. X Size Of Lesion In Cm (Optional): 1.1 Complex Repair And Rotation Flap Text: The defect edges were debeveled with a #15 scalpel blade.  The primary defect was closed partially with a complex linear closure.  Given the location of the remaining defect, shape of the defect and the proximity to free margins a rotation flap was deemed most appropriate for complete closure of the defect.  Using a sterile surgical marker, an appropriate advancement flap was drawn incorporating the defect and placing the expected incisions within the relaxed skin tension lines where possible.    The area thus outlined was incised deep to adipose tissue with a #15 scalpel blade.  The skin margins were undermined to an appropriate distance in all directions utilizing iris scissors. Keystone Flap Text: The defect edges were debeveled with a #15 scalpel blade.  Given the location of the defect, shape of the defect a keystone flap was deemed most appropriate.  Using a sterile surgical marker, an appropriate keystone flap was drawn incorporating the defect, outlining the appropriate donor tissue and placing the expected incisions within the relaxed skin tension lines where possible. The area thus outlined was incised deep to adipose tissue with a #15 scalpel blade.  The skin margins were undermined to an appropriate distance in all directions around the primary defect and laterally outward around the flap utilizing iris scissors. Size Of Margin In Cm: 0.4 Island Pedicle Flap-Requiring Vessel Identification Text: The defect edges were debeveled with a #15 scalpel blade.  Given the location of the defect, shape of the defect and the proximity to free margins an island pedicle advancement flap was deemed most appropriate.  Using a sterile surgical marker, an appropriate advancement flap was drawn, based on the axial vessel mentioned above, incorporating the defect, outlining the appropriate donor tissue and placing the expected incisions within the relaxed skin tension lines where possible.    The area thus outlined was incised deep to adipose tissue with a #15 scalpel blade.  The skin margins were undermined to an appropriate distance in all directions around the primary defect and laterally outward around the island pedicle utilizing iris scissors.  There was minimal undermining beneath the pedicle flap. O-L Flap Text: The defect edges were debeveled with a #15 scalpel blade.  Given the location of the defect, shape of the defect and the proximity to free margins an O-L flap was deemed most appropriate.  Using a sterile surgical marker, an appropriate advancement flap was drawn incorporating the defect and placing the expected incisions within the relaxed skin tension lines where possible.    The area thus outlined was incised deep to adipose tissue with a #15 scalpel blade.  The skin margins were undermined to an appropriate distance in all directions utilizing iris scissors. decreased cody/decreased step length Advancement Flap (Double) Text: The defect edges were debeveled with a #15 scalpel blade.  Given the location of the defect and the proximity to free margins a double advancement flap was deemed most appropriate.  Using a sterile surgical marker, the appropriate advancement flaps were drawn incorporating the defect and placing the expected incisions within the relaxed skin tension lines where possible.    The area thus outlined was incised deep to adipose tissue with a #15 scalpel blade.  The skin margins were undermined to an appropriate distance in all directions utilizing iris scissors. Positioning (Leave Blank If You Do Not Want): The patient was placed in a comfortable position exposing the surgical site. Advancement-Rotation Flap Text: The defect edges were debeveled with a #15 scalpel blade.  Given the location of the defect, shape of the defect and the proximity to free margins an advancement-rotation flap was deemed most appropriate.  Using a sterile surgical marker, an appropriate flap was drawn incorporating the defect and placing the expected incisions within the relaxed skin tension lines where possible. The area thus outlined was incised deep to adipose tissue with a #15 scalpel blade.  The skin margins were undermined to an appropriate distance in all directions utilizing iris scissors. O-Z Plasty Text: The defect edges were debeveled with a #15 scalpel blade.  Given the location of the defect, shape of the defect and the proximity to free margins an O-Z plasty (double transposition flap) was deemed most appropriate.  Using a sterile surgical marker, the appropriate transposition flaps were drawn incorporating the defect and placing the expected incisions within the relaxed skin tension lines where possible.    The area thus outlined was incised deep to adipose tissue with a #15 scalpel blade.  The skin margins were undermined to an appropriate distance in all directions utilizing iris scissors.  Hemostasis was achieved with electrocautery.  The flaps were then transposed into place, one clockwise and the other counterclockwise, and anchored with interrupted buried subcutaneous sutures. Dermal Autograft Text: The defect edges were debeveled with a #15 scalpel blade.  Given the location of the defect, shape of the defect and the proximity to free margins a dermal autograft was deemed most appropriate.  Using a sterile surgical marker, the primary defect shape was transferred to the donor site. The area thus outlined was incised deep to adipose tissue with a #15 scalpel blade.  The harvested graft was then trimmed of adipose and epidermal tissue until only dermis was left.  The skin graft was then placed in the primary defect and oriented appropriately. Split-Thickness Skin Graft Text: The defect edges were debeveled with a #15 scalpel blade.  Given the location of the defect, shape of the defect and the proximity to free margins a split thickness skin graft was deemed most appropriate.  Using a sterile surgical marker, the primary defect shape was transferred to the donor site. The split thickness graft was then harvested.  The skin graft was then placed in the primary defect and oriented appropriately. Additional Anesthesia Volume In Cc: 6 Excision Depth: adipose tissue Estimated Blood Loss (Cc): minimal Number Of Deep Sutures (Optional): 3 Ftsg Text: The defect edges were debeveled with a #15 scalpel blade.  Given the location of the defect, shape of the defect and the proximity to free margins a full thickness skin graft was deemed most appropriate.  Using a sterile surgical marker, the primary defect shape was transferred to the donor site. The area thus outlined was incised deep to adipose tissue with a #15 scalpel blade.  The harvested graft was then trimmed of adipose tissue until only dermis and epidermis was left.  The skin margins of the secondary defect were undermined to an appropriate distance in all directions utilizing iris scissors.  The secondary defect was closed with interrupted buried subcutaneous sutures.  The skin edges were then re-apposed with running  sutures.  The skin graft was then placed in the primary defect and oriented appropriately. Composite Graft Text: The defect edges were debeveled with a #15 scalpel blade.  Given the location of the defect, shape of the defect, the proximity to free margins and the fact the defect was full thickness a composite graft was deemed most appropriate.  The defect was outline and then transferred to the donor site.  A full thickness graft was then excised from the donor site. The graft was then placed in the primary defect, oriented appropriately and then sutured into place.  The secondary defect was then repaired using a primary closure. Intermediate / Complex Repair - Final Wound Length In Cm: 4.3 Secondary Defect Length (In Cm): 0 Scalpel Size: 15 blade Alar Island Pedicle Flap Text: The defect edges were debeveled with a #15 scalpel blade.  Given the location of the defect, shape of the defect and the proximity to the alar rim an island pedicle advancement flap was deemed most appropriate.  Using a sterile surgical marker, an appropriate advancement flap was drawn incorporating the defect, outlining the appropriate donor tissue and placing the expected incisions within the nasal ala running parallel to the alar rim. The area thus outlined was incised with a #15 scalpel blade.  The skin margins were undermined minimally to an appropriate distance in all directions around the primary defect and laterally outward around the island pedicle utilizing iris scissors.  There was minimal undermining beneath the pedicle flap. Bilobed Flap Text: The defect edges were debeveled with a #15 scalpel blade.  Given the location of the defect and the proximity to free margins a bilobe flap was deemed most appropriate.  Using a sterile surgical marker, an appropriate bilobe flap drawn around the defect.    The area thus outlined was incised deep to adipose tissue with a #15 scalpel blade.  The skin margins were undermined to an appropriate distance in all directions utilizing iris scissors. Epidermal Closure: running locked Rotation Flap Text: The defect edges were debeveled with a #15 scalpel blade.  Given the location of the defect, shape of the defect and the proximity to free margins a rotation flap was deemed most appropriate.  Using a sterile surgical marker, an appropriate rotation flap was drawn incorporating the defect and placing the expected incisions within the relaxed skin tension lines where possible.    The area thus outlined was incised deep to adipose tissue with a #15 scalpel blade.  The skin margins were undermined to an appropriate distance in all directions utilizing iris scissors. Helical Rim Advancement Flap Text: The defect edges were debeveled with a #15 blade scalpel.  Given the location of the defect and the proximity to free margins (helical rim) a double helical rim advancement flap was deemed most appropriate.  Using a sterile surgical marker, the appropriate advancement flaps were drawn incorporating the defect and placing the expected incisions between the helical rim and antihelix where possible.  The area thus outlined was incised through and through with a #15 scalpel blade.  With a skin hook and iris scissors, the flaps were gently and sharply undermined and freed up. Complex Repair And Ftsg Text: The defect edges were debeveled with a #15 scalpel blade.  The primary defect was closed partially with a complex linear closure.  Given the location of the defect, shape of the defect and the proximity to free margins a full thickness skin graft was deemed most appropriate to repair the remaining defect.  The graft was trimmed to fit the size of the remaining defect.  The graft was then placed in the primary defect, oriented appropriately, and sutured into place. Trilobed Flap Text: The defect edges were debeveled with a #15 scalpel blade.  Given the location of the defect and the proximity to free margins a trilobed flap was deemed most appropriate.  Using a sterile surgical marker, an appropriate trilobed flap drawn around the defect.    The area thus outlined was incised deep to adipose tissue with a #15 scalpel blade.  The skin margins were undermined to an appropriate distance in all directions utilizing iris scissors. V-Y Plasty Text: The defect edges were debeveled with a #15 scalpel blade.  Given the location of the defect, shape of the defect and the proximity to free margins an V-Y advancement flap was deemed most appropriate.  Using a sterile surgical marker, an appropriate advancement flap was drawn incorporating the defect and placing the expected incisions within the relaxed skin tension lines where possible.    The area thus outlined was incised deep to adipose tissue with a #15 scalpel blade.  The skin margins were undermined to an appropriate distance in all directions utilizing iris scissors. Complex Repair And V-Y Plasty Text: The defect edges were debeveled with a #15 scalpel blade.  The primary defect was closed partially with a complex linear closure.  Given the location of the remaining defect, shape of the defect and the proximity to free margins a V-Y plasty was deemed most appropriate for complete closure of the defect.  Using a sterile surgical marker, an appropriate advancement flap was drawn incorporating the defect and placing the expected incisions within the relaxed skin tension lines where possible.    The area thus outlined was incised deep to adipose tissue with a #15 scalpel blade.  The skin margins were undermined to an appropriate distance in all directions utilizing iris scissors. Curettage Prior To Excision?: Yes Perilesional Excision Additional Text (Leave Blank If You Do Not Want): The margin was drawn around the clinically apparent lesion. Incisions were then made along these lines to the appropriate tissue plane and the lesion was extirpated. Complex Repair And Tissue Cultured Epidermal Autograft Text: The defect edges were debeveled with a #15 scalpel blade.  The primary defect was closed partially with a complex linear closure.  Given the location of the defect, shape of the defect and the proximity to free margins an tissue cultured epidermal autograft was deemed most appropriate to repair the remaining defect.  The graft was trimmed to fit the size of the remaining defect.  The graft was then placed in the primary defect, oriented appropriately, and sutured into place. Dressing: dry sterile dressing Excisional Biopsy Additional Text (Leave Blank If You Do Not Want): The margin was drawn around the clinically apparent lesion. An elliptical shape was then drawn on the skin incorporating the lesion and margins.  Incisions were then made along these lines to the appropriate tissue plane and the lesion was extirpated. Wound Care: Bacitracin Partial Purse String (Simple) Text: Given the location of the defect and the characteristics of the surrounding skin a simple purse string closure was deemed most appropriate.  Undermining was performed circumferentially around the surgical defect.  A purse string suture was then placed and tightened. Wound tension of the circular defect prevented complete closure of the wound. Lip Wedge Excision Repair Text: Given the location of the defect and the proximity to free margins a full thickness wedge repair was deemed most appropriate.  Using a sterile surgical marker, the appropriate repair was drawn incorporating the defect and placing the expected incisions perpendicular to the vermillion border.  The vermillion border was also meticulously outlined to ensure appropriate reapproximation during the repair.  The area thus outlined was incised through and through with a #15 scalpel blade.  The muscularis and dermis were reaproximated with deep sutures following hemostasis. Care was taken to realign the vermillion border before proceeding with the superficial closure.  Once the vermillion was realigned the superfical and mucosal closure was finished. Complex Repair And Double M Plasty Text: The defect edges were debeveled with a #15 scalpel blade.  The primary defect was closed partially with a complex linear closure.  Given the location of the remaining defect, shape of the defect and the proximity to free margins a double M plasty was deemed most appropriate for complete closure of the defect.  Using a sterile surgical marker, an appropriate advancement flap was drawn incorporating the defect and placing the expected incisions within the relaxed skin tension lines where possible.    The area thus outlined was incised deep to adipose tissue with a #15 scalpel blade.  The skin margins were undermined to an appropriate distance in all directions utilizing iris scissors. Billing Type: Third-Party Bill Melolabial Transposition Flap Text: The defect edges were debeveled with a #15 scalpel blade.  Given the location of the defect and the proximity to free margins a melolabial flap was deemed most appropriate.  Using a sterile surgical marker, an appropriate melolabial transposition flap was drawn incorporating the defect.    The area thus outlined was incised deep to adipose tissue with a #15 scalpel blade.  The skin margins were undermined to an appropriate distance in all directions utilizing iris scissors. Z Plasty Text: The lesion was extirpated to the level of the fat with a #15 scalpel blade.  Given the location of the defect, shape of the defect and the proximity to free margins a Z-plasty was deemed most appropriate for repair.  Using a sterile surgical marker, the appropriate transposition arms of the Z-plasty were drawn incorporating the defect and placing the expected incisions within the relaxed skin tension lines where possible.    The area thus outlined was incised deep to adipose tissue with a #15 scalpel blade.  The skin margins were undermined to an appropriate distance in all directions utilizing iris scissors.  The opposing transposition arms were then transposed into place in opposite direction and anchored with interrupted buried subcutaneous sutures. Mastoid Interpolation Flap Text: A decision was made to reconstruct the defect utilizing an interpolation axial flap and a staged reconstruction.  A telfa template was made of the defect.  This telfa template was then used to outline the mastoid interpolation flap.  The donor area for the pedicle flap was then injected with anesthesia.  The flap was excised through the skin and subcutaneous tissue down to the layer of the underlying musculature.  The pedicle flap was carefully excised within this deep plane to maintain its blood supply.  The edges of the donor site were undermined.   The donor site was closed in a primary fashion.  The pedicle was then rotated into position and sutured.  Once the tube was sutured into place, adequate blood supply was confirmed with blanching and refill.  The pedicle was then wrapped with xeroform gauze and dressed appropriately with a telfa and gauze bandage to ensure continued blood supply and protect the attached pedicle. A-T Advancement Flap Text: The defect edges were debeveled with a #15 scalpel blade.  Given the location of the defect, shape of the defect and the proximity to free margins an A-T advancement flap was deemed most appropriate.  Using a sterile surgical marker, an appropriate advancement flap was drawn incorporating the defect and placing the expected incisions within the relaxed skin tension lines where possible.    The area thus outlined was incised deep to adipose tissue with a #15 scalpel blade.  The skin margins were undermined to an appropriate distance in all directions utilizing iris scissors. Complex Repair And Bilobe Flap Text: The defect edges were debeveled with a #15 scalpel blade.  The primary defect was closed partially with a complex linear closure.  Given the location of the remaining defect, shape of the defect and the proximity to free margins a bilobe flap was deemed most appropriate for complete closure of the defect.  Using a sterile surgical marker, an appropriate advancement flap was drawn incorporating the defect and placing the expected incisions within the relaxed skin tension lines where possible.    The area thus outlined was incised deep to adipose tissue with a #15 scalpel blade.  The skin margins were undermined to an appropriate distance in all directions utilizing iris scissors. Rhombic Flap Text: The defect edges were debeveled with a #15 scalpel blade.  Given the location of the defect and the proximity to free margins a rhombic flap was deemed most appropriate.  Using a sterile surgical marker, an appropriate rhombic flap was drawn incorporating the defect.    The area thus outlined was incised deep to adipose tissue with a #15 scalpel blade.  The skin margins were undermined to an appropriate distance in all directions utilizing iris scissors. Modified Advancement Flap Text: The defect edges were debeveled with a #15 scalpel blade.  Given the location of the defect, shape of the defect and the proximity to free margins a modified advancement flap was deemed most appropriate.  Using a sterile surgical marker, an appropriate advancement flap was drawn incorporating the defect and placing the expected incisions within the relaxed skin tension lines where possible.    The area thus outlined was incised deep to adipose tissue with a #15 scalpel blade.  The skin margins were undermined to an appropriate distance in all directions utilizing iris scissors. Detail Level: Detailed Hemostasis: Electrocautery Interpolation Flap Text: A decision was made to reconstruct the defect utilizing an interpolation axial flap and a staged reconstruction.  A telfa template was made of the defect.  This telfa template was then used to outline the interpolation flap.  The donor area for the pedicle flap was then injected with anesthesia.  The flap was excised through the skin and subcutaneous tissue down to the layer of the underlying musculature.  The interpolation flap was carefully excised within this deep plane to maintain its blood supply.  The edges of the donor site were undermined.   The donor site was closed in a primary fashion.  The pedicle was then rotated into position and sutured.  Once the tube was sutured into place, adequate blood supply was confirmed with blanching and refill.  The pedicle was then wrapped with xeroform gauze and dressed appropriately with a telfa and gauze bandage to ensure continued blood supply and protect the attached pedicle. Complex Repair And Double Advancement Flap Text: The defect edges were debeveled with a #15 scalpel blade.  The primary defect was closed partially with a complex linear closure.  Given the location of the remaining defect, shape of the defect and the proximity to free margins a double advancement flap was deemed most appropriate for complete closure of the defect.  Using a sterile surgical marker, an appropriate advancement flap was drawn incorporating the defect and placing the expected incisions within the relaxed skin tension lines where possible.    The area thus outlined was incised deep to adipose tissue with a #15 scalpel blade.  The skin margins were undermined to an appropriate distance in all directions utilizing iris scissors. Accession #: [id number] Repair Type: Intermediate Complex Repair And Melolabial Flap Text: The defect edges were debeveled with a #15 scalpel blade.  The primary defect was closed partially with a complex linear closure.  Given the location of the remaining defect, shape of the defect and the proximity to free margins a melolabial flap was deemed most appropriate for complete closure of the defect.  Using a sterile surgical marker, an appropriate advancement flap was drawn incorporating the defect and placing the expected incisions within the relaxed skin tension lines where possible.    The area thus outlined was incised deep to adipose tissue with a #15 scalpel blade.  The skin margins were undermined to an appropriate distance in all directions utilizing iris scissors. Fusiform Excision Additional Text (Leave Blank If You Do Not Want): The margin was drawn around the clinically apparent lesion.  A fusiform shape was then drawn on the skin incorporating the lesion and margins.  Incisions were then made along these lines to the appropriate tissue plane and the lesion was extirpated. Complex Repair And O-L Flap Text: The defect edges were debeveled with a #15 scalpel blade.  The primary defect was closed partially with a complex linear closure.  Given the location of the remaining defect, shape of the defect and the proximity to free margins an O-L flap was deemed most appropriate for complete closure of the defect.  Using a sterile surgical marker, an appropriate flap was drawn incorporating the defect and placing the expected incisions within the relaxed skin tension lines where possible.    The area thus outlined was incised deep to adipose tissue with a #15 scalpel blade.  The skin margins were undermined to an appropriate distance in all directions utilizing iris scissors. Mucosal Advancement Flap Text: Given the location of the defect, shape of the defect and the proximity to free margins a mucosal advancement flap was deemed most appropriate. Incisions were made with a 15 blade scalpel in the appropriate fashion along the cutaneous vermillion border and the mucosal lip. The remaining actinically damaged mucosal tissue was excised.  The mucosal advancement flap was then elevated to the gingival sulcus with care taken to preserve the neurovascular structures and advanced into the primary defect. Care was taken to ensure that precise realignment of the vermillion border was achieved. Slit Excision Additional Text (Leave Blank If You Do Not Want): A linear line was drawn on the skin overlying the lesion. An incision was made slowly until the lesion was visualized.  Once visualized, the lesion was removed with blunt dissection. Muscle Hinge Flap Text: The defect edges were debeveled with a #15 scalpel blade.  Given the size, depth and location of the defect and the proximity to free margins a muscle hinge flap was deemed most appropriate.  Using a sterile surgical marker, an appropriate hinge flap was drawn incorporating the defect. The area thus outlined was incised with a #15 scalpel blade.  The skin margins were undermined to an appropriate distance in all directions utilizing iris scissors. Complex Repair And Modified Advancement Flap Text: The defect edges were debeveled with a #15 scalpel blade.  The primary defect was closed partially with a complex linear closure.  Given the location of the remaining defect, shape of the defect and the proximity to free margins a modified advancement flap was deemed most appropriate for complete closure of the defect.  Using a sterile surgical marker, an appropriate advancement flap was drawn incorporating the defect and placing the expected incisions within the relaxed skin tension lines where possible.    The area thus outlined was incised deep to adipose tissue with a #15 scalpel blade.  The skin margins were undermined to an appropriate distance in all directions utilizing iris scissors. Intermediate Repair Preamble Text (Leave Blank If You Do Not Want): Undermining was performed with blunt dissection. Bilateral Helical Rim Advancement Flap Text: The defect edges were debeveled with a #15 blade scalpel.  Given the location of the defect and the proximity to free margins (helical rim) a bilateral helical rim advancement flap was deemed most appropriate.  Using a sterile surgical marker, the appropriate advancement flaps were drawn incorporating the defect and placing the expected incisions between the helical rim and antihelix where possible.  The area thus outlined was incised through and through with a #15 scalpel blade.  With a skin hook and iris scissors, the flaps were gently and sharply undermined and freed up. Island Pedicle Flap With Canthal Suspension Text: The defect edges were debeveled with a #15 scalpel blade.  Given the location of the defect, shape of the defect and the proximity to free margins an island pedicle advancement flap was deemed most appropriate.  Using a sterile surgical marker, an appropriate advancement flap was drawn incorporating the defect, outlining the appropriate donor tissue and placing the expected incisions within the relaxed skin tension lines where possible. The area thus outlined was incised deep to adipose tissue with a #15 scalpel blade.  The skin margins were undermined to an appropriate distance in all directions around the primary defect and laterally outward around the island pedicle utilizing iris scissors.  There was minimal undermining beneath the pedicle flap. A suspension suture was placed in the canthal tendon to prevent tension and prevent ectropion. Complex Repair And Single Advancement Flap Text: The defect edges were debeveled with a #15 scalpel blade.  The primary defect was closed partially with a complex linear closure.  Given the location of the remaining defect, shape of the defect and the proximity to free margins a single advancement flap was deemed most appropriate for complete closure of the defect.  Using a sterile surgical marker, an appropriate advancement flap was drawn incorporating the defect and placing the expected incisions within the relaxed skin tension lines where possible.    The area thus outlined was incised deep to adipose tissue with a #15 scalpel blade.  The skin margins were undermined to an appropriate distance in all directions utilizing iris scissors. Advancement Flap (Single) Text: The defect edges were debeveled with a #15 scalpel blade.  Given the location of the defect and the proximity to free margins a single advancement flap was deemed most appropriate.  Using a sterile surgical marker, an appropriate advancement flap was drawn incorporating the defect and placing the expected incisions within the relaxed skin tension lines where possible.    The area thus outlined was incised deep to adipose tissue with a #15 scalpel blade.  The skin margins were undermined to an appropriate distance in all directions utilizing iris scissors. Burow's Advancement Flap Text: The defect edges were debeveled with a #15 scalpel blade.  Given the location of the defect and the proximity to free margins a Burow's advancement flap was deemed most appropriate.  Using a sterile surgical marker, the appropriate advancement flap was drawn incorporating the defect and placing the expected incisions within the relaxed skin tension lines where possible.    The area thus outlined was incised deep to adipose tissue with a #15 scalpel blade.  The skin margins were undermined to an appropriate distance in all directions utilizing iris scissors. Home Suture Removal Text: Patient was provided a home suture removal kit and will remove their sutures at home.  If they have any questions or difficulties they will call the office. Transposition Flap Text: The defect edges were debeveled with a #15 scalpel blade.  Given the location of the defect and the proximity to free margins a transposition flap was deemed most appropriate.  Using a sterile surgical marker, an appropriate transposition flap was drawn incorporating the defect.    The area thus outlined was incised deep to adipose tissue with a #15 scalpel blade.  The skin margins were undermined to an appropriate distance in all directions utilizing iris scissors. O-T Plasty Text: The defect edges were debeveled with a #15 scalpel blade.  Given the location of the defect, shape of the defect and the proximity to free margins an O-T plasty was deemed most appropriate.  Using a sterile surgical marker, an appropriate O-T plasty was drawn incorporating the defect and placing the expected incisions within the relaxed skin tension lines where possible.    The area thus outlined was incised deep to adipose tissue with a #15 scalpel blade.  The skin margins were undermined to an appropriate distance in all directions utilizing iris scissors. Post-Care Instructions: I reviewed with the patient in detail post-care instructions. Patient is not to engage in any heavy lifting, exercise, or swimming for the next 14 days. Should the patient develop any fevers, chills, bleeding, severe pain patient will contact the office immediately. Size Of Lesion In Cm: 1 Complex Repair And Split-Thickness Skin Graft Text: The defect edges were debeveled with a #15 scalpel blade.  The primary defect was closed partially with a complex linear closure.  Given the location of the defect, shape of the defect and the proximity to free margins a split thickness skin graft was deemed most appropriate to repair the remaining defect.  The graft was trimmed to fit the size of the remaining defect.  The graft was then placed in the primary defect, oriented appropriately, and sutured into place. Curvilinear Excision Additional Text (Leave Blank If You Do Not Want): The margin was drawn around the clinically apparent lesion.  A curvilinear shape was then drawn on the skin incorporating the lesion and margins.  Incisions were then made along these lines to the appropriate tissue plane and the lesion was extirpated. Hatchet Flap Text: The defect edges were debeveled with a #15 scalpel blade.  Given the location of the defect, shape of the defect and the proximity to free margins a hatchet flap was deemed most appropriate.  Using a sterile surgical marker, an appropriate hatchet flap was drawn incorporating the defect and placing the expected incisions within the relaxed skin tension lines where possible.    The area thus outlined was incised deep to adipose tissue with a #15 scalpel blade.  The skin margins were undermined to an appropriate distance in all directions utilizing iris scissors. Complex Repair And Skin Substitute Graft Text: The defect edges were debeveled with a #15 scalpel blade.  The primary defect was closed partially with a complex linear closure.  Given the location of the remaining defect, shape of the defect and the proximity to free margins a skin substitute graft was deemed most appropriate to repair the remaining defect.  The graft was trimmed to fit the size of the remaining defect.  The graft was then placed in the primary defect, oriented appropriately, and sutured into place. Cheek-To-Nose Interpolation Flap Text: A decision was made to reconstruct the defect utilizing an interpolation axial flap and a staged reconstruction.  A telfa template was made of the defect.  This telfa template was then used to outline the Cheek-To-Nose Interpolation flap.  The donor area for the pedicle flap was then injected with anesthesia.  The flap was excised through the skin and subcutaneous tissue down to the layer of the underlying musculature.  The interpolation flap was carefully excised within this deep plane to maintain its blood supply.  The edges of the donor site were undermined.   The donor site was closed in a primary fashion.  The pedicle was then rotated into position and sutured.  Once the tube was sutured into place, adequate blood supply was confirmed with blanching and refill.  The pedicle was then wrapped with xeroform gauze and dressed appropriately with a telfa and gauze bandage to ensure continued blood supply and protect the attached pedicle. Crescentic Advancement Flap Text: The defect edges were debeveled with a #15 scalpel blade.  Given the location of the defect and the proximity to free margins a crescentic advancement flap was deemed most appropriate.  Using a sterile surgical marker, the appropriate advancement flap was drawn incorporating the defect and placing the expected incisions within the relaxed skin tension lines where possible.    The area thus outlined was incised deep to adipose tissue with a #15 scalpel blade.  The skin margins were undermined to an appropriate distance in all directions utilizing iris scissors. Complex Repair And O-T Advancement Flap Text: The defect edges were debeveled with a #15 scalpel blade.  The primary defect was closed partially with a complex linear closure.  Given the location of the remaining defect, shape of the defect and the proximity to free margins an O-T advancement flap was deemed most appropriate for complete closure of the defect.  Using a sterile surgical marker, an appropriate advancement flap was drawn incorporating the defect and placing the expected incisions within the relaxed skin tension lines where possible.    The area thus outlined was incised deep to adipose tissue with a #15 scalpel blade.  The skin margins were undermined to an appropriate distance in all directions utilizing iris scissors. Ear Star Wedge Flap Text: The defect edges were debeveled with a #15 blade scalpel.  Given the location of the defect and the proximity to free margins (helical rim) an ear star wedge flap was deemed most appropriate.  Using a sterile surgical marker, the appropriate flap was drawn incorporating the defect and placing the expected incisions between the helical rim and antihelix where possible.  The area thus outlined was incised through and through with a #15 scalpel blade. Complex Repair And W Plasty Text: The defect edges were debeveled with a #15 scalpel blade.  The primary defect was closed partially with a complex linear closure.  Given the location of the remaining defect, shape of the defect and the proximity to free margins a W plasty was deemed most appropriate for complete closure of the defect.  Using a sterile surgical marker, an appropriate advancement flap was drawn incorporating the defect and placing the expected incisions within the relaxed skin tension lines where possible.    The area thus outlined was incised deep to adipose tissue with a #15 scalpel blade.  The skin margins were undermined to an appropriate distance in all directions utilizing iris scissors. Paramedian Forehead Flap Text: A decision was made to reconstruct the defect utilizing an interpolation axial flap and a staged reconstruction.  A telfa template was made of the defect.  This telfa template was then used to outline the paramedian forehead pedicle flap.  The donor area for the pedicle flap was then injected with anesthesia.  The flap was excised through the skin and subcutaneous tissue down to the layer of the underlying musculature.  The pedicle flap was carefully excised within this deep plane to maintain its blood supply.  The edges of the donor site were undermined.   The donor site was closed in a primary fashion.  The pedicle was then rotated into position and sutured.  Once the tube was sutured into place, adequate blood supply was confirmed with blanching and refill.  The pedicle was then wrapped with xeroform gauze and dressed appropriately with a telfa and gauze bandage to ensure continued blood supply and protect the attached pedicle. Render Path Notes In Note?: no Complex Repair And Z Plasty Text: The defect edges were debeveled with a #15 scalpel blade.  The primary defect was closed partially with a complex linear closure.  Given the location of the remaining defect, shape of the defect and the proximity to free margins a Z plasty was deemed most appropriate for complete closure of the defect.  Using a sterile surgical marker, an appropriate advancement flap was drawn incorporating the defect and placing the expected incisions within the relaxed skin tension lines where possible.    The area thus outlined was incised deep to adipose tissue with a #15 scalpel blade.  The skin margins were undermined to an appropriate distance in all directions utilizing iris scissors. S Plasty Text: Given the location and shape of the defect, and the orientation of relaxed skin tension lines, an S-plasty was deemed most appropriate for repair.  Using a sterile surgical marker, the appropriate outline of the S-plasty was drawn, incorporating the defect and placing the expected incisions within the relaxed skin tension lines where possible.  The area thus outlined was incised deep to adipose tissue with a #15 scalpel blade.  The skin margins were undermined to an appropriate distance in all directions utilizing iris scissors. The skin flaps were advanced over the defect.  The opposing margins were then approximated with interrupted buried subcutaneous sutures. Complex Repair And Rhombic Flap Text: The defect edges were debeveled with a #15 scalpel blade.  The primary defect was closed partially with a complex linear closure.  Given the location of the remaining defect, shape of the defect and the proximity to free margins a rhombic flap was deemed most appropriate for complete closure of the defect.  Using a sterile surgical marker, an appropriate advancement flap was drawn incorporating the defect and placing the expected incisions within the relaxed skin tension lines where possible.    The area thus outlined was incised deep to adipose tissue with a #15 scalpel blade.  The skin margins were undermined to an appropriate distance in all directions utilizing iris scissors. Elliptical Excision Additional Text (Leave Blank If You Do Not Want): The margin was drawn around the clinically apparent lesion.  An elliptical shape was then drawn on the skin incorporating the lesion and margins.  Incisions were then made along these lines to the appropriate tissue plane and the lesion was extirpated. Complex Repair And A-T Advancement Flap Text: The defect edges were debeveled with a #15 scalpel blade.  The primary defect was closed partially with a complex linear closure.  Given the location of the remaining defect, shape of the defect and the proximity to free margins an A-T advancement flap was deemed most appropriate for complete closure of the defect.  Using a sterile surgical marker, an appropriate advancement flap was drawn incorporating the defect and placing the expected incisions within the relaxed skin tension lines where possible.    The area thus outlined was incised deep to adipose tissue with a #15 scalpel blade.  The skin margins were undermined to an appropriate distance in all directions utilizing iris scissors. Cartilage Graft Text: The defect edges were debeveled with a #15 scalpel blade.  Given the location of the defect, shape of the defect, the fact the defect involved a full thickness cartilage defect a cartilage graft was deemed most appropriate.  An appropriate donor site was identified, cleansed, and anesthetized. The cartilage graft was then harvested and transferred to the recipient site, oriented appropriately and then sutured into place.  The secondary defect was then repaired using a primary closure. Complex Repair And M Plasty Text: The defect edges were debeveled with a #15 scalpel blade.  The primary defect was closed partially with a complex linear closure.  Given the location of the remaining defect, shape of the defect and the proximity to free margins an M plasty was deemed most appropriate for complete closure of the defect.  Using a sterile surgical marker, an appropriate advancement flap was drawn incorporating the defect and placing the expected incisions within the relaxed skin tension lines where possible.    The area thus outlined was incised deep to adipose tissue with a #15 scalpel blade.  The skin margins were undermined to an appropriate distance in all directions utilizing iris scissors. Path Notes (To The Dermatopathologist): Please check margins. Suture adan at superior tip. Xenograft Text: The defect edges were debeveled with a #15 scalpel blade.  Given the location of the defect, shape of the defect and the proximity to free margins a xenograft was deemed most appropriate.  The graft was then trimmed to fit the size of the defect.  The graft was then placed in the primary defect and oriented appropriately. Epidermal Sutures: 4-0 Nylon O-T Advancement Flap Text: The defect edges were debeveled with a #15 scalpel blade.  Given the location of the defect, shape of the defect and the proximity to free margins an O-T advancement flap was deemed most appropriate.  Using a sterile surgical marker, an appropriate advancement flap was drawn incorporating the defect and placing the expected incisions within the relaxed skin tension lines where possible.    The area thus outlined was incised deep to adipose tissue with a #15 scalpel blade.  The skin margins were undermined to an appropriate distance in all directions utilizing iris scissors. Complex Repair And Epidermal Autograft Text: The defect edges were debeveled with a #15 scalpel blade.  The primary defect was closed partially with a complex linear closure.  Given the location of the defect, shape of the defect and the proximity to free margins an epidermal autograft was deemed most appropriate to repair the remaining defect.  The graft was trimmed to fit the size of the remaining defect.  The graft was then placed in the primary defect, oriented appropriately, and sutured into place. Epidermal Autograft Text: The defect edges were debeveled with a #15 scalpel blade.  Given the location of the defect, shape of the defect and the proximity to free margins an epidermal autograft was deemed most appropriate.  Using a sterile surgical marker, the primary defect shape was transferred to the donor site. The epidermal graft was then harvested.  The skin graft was then placed in the primary defect and oriented appropriately. H Plasty Text: Given the location of the defect, shape of the defect and the proximity to free margins a H-plasty was deemed most appropriate for repair.  Using a sterile surgical marker, the appropriate advancement arms of the H-plasty were drawn incorporating the defect and placing the expected incisions within the relaxed skin tension lines where possible. The area thus outlined was incised deep to adipose tissue with a #15 scalpel blade. The skin margins were undermined to an appropriate distance in all directions utilizing iris scissors.  The opposing advancement arms were then advanced into place in opposite direction and anchored with interrupted buried subcutaneous sutures. Double Island Pedicle Flap Text: The defect edges were debeveled with a #15 scalpel blade.  Given the location of the defect, shape of the defect and the proximity to free margins a double island pedicle advancement flap was deemed most appropriate.  Using a sterile surgical marker, an appropriate advancement flap was drawn incorporating the defect, outlining the appropriate donor tissue and placing the expected incisions within the relaxed skin tension lines where possible.    The area thus outlined was incised deep to adipose tissue with a #15 scalpel blade.  The skin margins were undermined to an appropriate distance in all directions around the primary defect and laterally outward around the island pedicle utilizing iris scissors.  There was minimal undermining beneath the pedicle flap. Spiral Flap Text: The defect edges were debeveled with a #15 scalpel blade.  Given the location of the defect, shape of the defect and the proximity to free margins a spiral flap was deemed most appropriate.  Using a sterile surgical marker, an appropriate rotation flap was drawn incorporating the defect and placing the expected incisions within the relaxed skin tension lines where possible. The area thus outlined was incised deep to adipose tissue with a #15 scalpel blade.  The skin margins were undermined to an appropriate distance in all directions utilizing iris scissors. Skin Substitute Text: The defect edges were debeveled with a #15 scalpel blade.  Given the location of the defect, shape of the defect and the proximity to free margins a skin substitute graft was deemed most appropriate.  The graft material was trimmed to fit the size of the defect. The graft was then placed in the primary defect and oriented appropriately. Melolabial Interpolation Flap Text: A decision was made to reconstruct the defect utilizing an interpolation axial flap and a staged reconstruction.  A telfa template was made of the defect.  This telfa template was then used to outline the melolabial interpolation flap.  The donor area for the pedicle flap was then injected with anesthesia.  The flap was excised through the skin and subcutaneous tissue down to the layer of the underlying musculature.  The pedicle flap was carefully excised within this deep plane to maintain its blood supply.  The edges of the donor site were undermined.   The donor site was closed in a primary fashion.  The pedicle was then rotated into position and sutured.  Once the tube was sutured into place, adequate blood supply was confirmed with blanching and refill.  The pedicle was then wrapped with xeroform gauze and dressed appropriately with a telfa and gauze bandage to ensure continued blood supply and protect the attached pedicle. Complex Repair And Dermal Autograft Text: The defect edges were debeveled with a #15 scalpel blade.  The primary defect was closed partially with a complex linear closure.  Given the location of the defect, shape of the defect and the proximity to free margins an dermal autograft was deemed most appropriate to repair the remaining defect.  The graft was trimmed to fit the size of the remaining defect.  The graft was then placed in the primary defect, oriented appropriately, and sutured into place. Complex Repair Preamble Text (Leave Blank If You Do Not Want): Extensive wide undermining was performed. No Repair - Repaired With Adjacent Surgical Defect Text (Leave Blank If You Do Not Want): After the excision the defect was repaired concurrently with another surgical defect which was in close approximation. Bi-Rhombic Flap Text: The defect edges were debeveled with a #15 scalpel blade.  Given the location of the defect and the proximity to free margins a bi-rhombic flap was deemed most appropriate.  Using a sterile surgical marker, an appropriate rhombic flap was drawn incorporating the defect. The area thus outlined was incised deep to adipose tissue with a #15 scalpel blade.  The skin margins were undermined to an appropriate distance in all directions utilizing iris scissors. W Plasty Text: The lesion was extirpated to the level of the fat with a #15 scalpel blade.  Given the location of the defect, shape of the defect and the proximity to free margins a W-plasty was deemed most appropriate for repair.  Using a sterile surgical marker, the appropriate transposition arms of the W-plasty were drawn incorporating the defect and placing the expected incisions within the relaxed skin tension lines where possible.    The area thus outlined was incised deep to adipose tissue with a #15 scalpel blade.  The skin margins were undermined to an appropriate distance in all directions utilizing iris scissors.  The opposing transposition arms were then transposed into place in opposite direction and anchored with interrupted buried subcutaneous sutures. Purse String (Intermediate) Text: Given the location of the defect and the characteristics of the surrounding skin a pursestring intermediate closure was deemed most appropriate.  Undermining was performed circumfirentially around the surgical defect.  A purstring suture was then placed and tightened. Repair Performed By Another Provider Text (Leave Blank If You Do Not Want): After the tissue was excised the defect was repaired by another provider. Dorsal Nasal Flap Text: The defect edges were debeveled with a #15 scalpel blade.  Given the location of the defect and the proximity to free margins a dorsal nasal flap was deemed most appropriate.  Using a sterile surgical marker, an appropriate dorsal nasal flap was drawn around the defect.    The area thus outlined was incised deep to adipose tissue with a #15 scalpel blade.  The skin margins were undermined to an appropriate distance in all directions utilizing iris scissors. Purse String (Simple) Text: Given the location of the defect and the characteristics of the surrounding skin a purse string simple closure was deemed most appropriate.  Undermining was performed circumferentially around the surgical defect.  A purse string suture was then placed and tightened.

## 2021-05-13 NOTE — DIETITIAN INITIAL EVALUATION ADULT. - PERTINENT MEDS FT
MEDICATIONS  (STANDING):  aspirin enteric coated 81 milliGRAM(s) Oral daily  atorvastatin 80 milliGRAM(s) Oral at bedtime  desvenlafaxine  milliGRAM(s) Oral daily  enoxaparin Injectable 40 milliGRAM(s) SubCutaneous daily  filgrastim-sndz (ZARXIO) Injectable 300 MICROGram(s) SubCutaneous daily  magnesium citrate Oral Solution 1 Bottle Oral once  pantoprazole    Tablet 40 milliGRAM(s) Oral before breakfast  potassium phosphate / sodium phosphate Powder (PHOS-NaK) 1 Packet(s) Oral three times a day before meals  sodium chloride 0.9%. 1000 milliLiter(s) (75 mL/Hr) IV Continuous <Continuous>  traZODone 300 milliGRAM(s) Oral at bedtime    MEDICATIONS  (PRN):  ondansetron Injectable 4 milliGRAM(s) IV Push every 6 hours PRN Nausea and/or Vomiting

## 2021-05-13 NOTE — PROGRESS NOTE ADULT - SUBJECTIVE AND OBJECTIVE BOX
Subjective:  Patient is a 76y old  Female who presents with a chief complaint of acute leukopenia, weakness      HPI:  75 yo F with a PMH CAD s/p stent, depression, HLD, breast cancer s/p RT/mastectomy/lumpectomy (on chemotherapy), and insomnia who presents on 21 with weakness. She has been on chemotherapy every 21 days for the past few months, last one on  (9 days ago). She had tolerated chemotherapy but a couple days after the last treatment, she developed severe weakness. She has since had tremendous difficulty with walking or other minimal exertion. She denies dizziness while walking, fevers, chills, cough, chest pain, abdominal pain, nausea, vomiting, diarrhea, blood in her stool or urine, dysuria, or rash/swelling. She states her next (and so far last) treatment was scheduled for .  Her breast cancer, according to paperwork, is stage IIB.  In the ED, she was given Trazodone 300 mg PO x1 and NS 1L x1.       - patient seen and examined at bedside earlier today, + gen weakness,  dyspnea on exertion, + constipation for many days, POC discussed      Review of system- Rest of the review of system are negative except mentioned in HPI    Objective:   T(C): 36.8 (21 @ 08:57), Max: 37.3 (21 @ 01:38)  HR: 82 (21 @ 08:57) (73 - 102)  BP: 95/46 (21 @ 08:57) (87/51 - 111/62)  RR: 17 (21 @ 08:57) (15 - 17)  SpO2: 95% (21 @ 08:57) (95% - 100%)  Wt(kg): --  Daily Height in cm: 154.94 (12 May 2021 17:26)    Daily   CAPILLARY BLOOD GLUCOSE    Physical exam:   GENERAL: NAD  NERVOUS SYSTEM:  Alert & Oriented X3, non- focal exam, Motor Strength 5/5 B/L upper and lower extremities; DTRs 2+ intact and symmetric  HEAD:  Atraumatic, Normocephalic  EYES: EOMI, PERRLA, conjunctiva and sclera clear  HEENT: Moist mucous membranes  NECK: Supple, No JVD  CHEST/LUNG: Clear to auscultation bilaterally; No rales, no rhonchi, no wheezing  HEART: Regular rate and rhythm; No murmurs, no rubs or gallops  ABDOMEN: Soft, Non-tender, Non-distended; Bowel sounds present  GENITOURINARY- Voiding, no suprapubic tenderness  EXTREMITIES:  2+ Peripheral Pulses, No clubbing, cyanosis,   edema  MUSCULOSKELETAL:- No muscle tenderness, Muscle tone normal, No joint tenderness, no Joint swelling,  Joint ROM –normal   SKIN-no rash, no lesion  LABS: all reviewed                        8.3    1.05  )-----------( 316      ( 13 May 2021 06:55 )             25.4         134<L>  |  104  |  16  ----------------------------<  93  3.6   |  23  |  0.77    Ca    8.0<L>      13 May 2021 06:55  Phos  2.4       Mg     2.0         TPro  5.3<L>  /  Alb  2.1<L>  /  TBili  0.3  /  DBili  x   /  AST  89<H>  /  ALT  69  /  AlkPhos  166<H>      PT/INR - ( 13 May 2021 06:55 )   PT: 11.2 sec;   INR: 0.96 ratio         PTT - ( 13 May 2021 06:55 )  PTT:22.6 sec    CARDIAC MARKERS ( 13 May 2021 06:55 )  0.045 ng/mL / x     / 116 U/L / x     / x          Urinalysis Basic - ( 13 May 2021 00:56 )    Color: Yellow / Appearance: Slightly Turbid / S.010 / pH: x  Gluc: x / Ketone: Negative  / Bili: Negative / Urobili: Negative mg/dL   Blood: x / Protein: 15 mg/dL / Nitrite: Negative   Leuk Esterase: Negative / RBC: 3-5 /HPF / WBC 0-2   Sq Epi: x / Non Sq Epi: Occasional / Bacteria: Occasional          RECENT CULTURES:    RADIOLOGY & ADDITIONAL TESTS: all reviewed EKG reviewed  Current medications:  aspirin enteric coated 81 milliGRAM(s) Oral daily  atorvastatin 80 milliGRAM(s) Oral at bedtime  desvenlafaxine  milliGRAM(s) Oral daily  enoxaparin Injectable 40 milliGRAM(s) SubCutaneous daily  filgrastim-sndz (ZARXIO) Injectable 300 MICROGram(s) SubCutaneous daily  magnesium citrate Oral Solution 1 Bottle Oral once  ondansetron Injectable 4 milliGRAM(s) IV Push every 6 hours PRN  pantoprazole    Tablet 40 milliGRAM(s) Oral before breakfast  potassium phosphate / sodium phosphate Powder (PHOS-NaK) 1 Packet(s) Oral three times a day before meals  sodium chloride 0.9%. 1000 milliLiter(s) IV Continuous <Continuous>  traZODone 300 milliGRAM(s) Oral at bedtime             Subjective:  Patient is a 76y old  Female who presents with a chief complaint of acute leukopenia, weakness      HPI:  77 yo F with a PMH CAD s/p stent, depression, HLD, breast cancer s/p RT/mastectomy/lumpectomy (on chemotherapy), and insomnia who presents on 21 with weakness. She has been on chemotherapy every 21 days for the past few months, last one on  (9 days ago). She had tolerated chemotherapy but a couple days after the last treatment, she developed severe weakness. She has since had tremendous difficulty with walking or other minimal exertion. She denies dizziness while walking, fevers, chills, cough, chest pain, abdominal pain, nausea, vomiting, diarrhea, blood in her stool or urine, dysuria, or rash/swelling. She states her next (and so far last) treatment was scheduled for .  Her breast cancer, according to paperwork, is stage IIB.  In the ED, she was given Trazodone 300 mg PO x1 and NS 1L x1.       - patient seen and examined at bedside earlier today, + gen weakness,  dyspnea on exertion, + constipation for many days, POC discussed      Review of system- Rest of the review of system are negative except mentioned in HPI    Objective:   T(C): 36.8 (21 @ 08:57), Max: 37.3 (21 @ 01:38)  HR: 82 (21 @ 08:57) (73 - 102)  BP: 95/46 (21 @ 08:57) (87/51 - 111/62)  RR: 17 (21 @ 08:57) (15 - 17)  SpO2: 95% (21 @ 08:57) (95% - 100%)  Wt(kg): --  Daily Height in cm: 154.94 (12 May 2021 17:26)    Daily   CAPILLARY BLOOD GLUCOSE    Physical exam:   GENERAL: NAD  NERVOUS SYSTEM:  Alert & Oriented X3, non- focal exam, Motor Strength 5/5 B/L upper and lower extremities; DTRs 2+ intact and symmetric  HEAD:  Atraumatic, Normocephalic  EYES: EOMI, PERRLA, conjunctiva and sclera clear  HEENT: Moist mucous membranes  NECK: Supple, No JVD  CHEST/LUNG: Clear to auscultation bilaterally; No rales, no rhonchi, no wheezing  HEART: Regular rate and rhythm; No murmurs, no rubs or gallops  ABDOMEN: Soft, Non-tender, Non-distended; Bowel sounds present  GENITOURINARY- Voiding, no suprapubic tenderness  EXTREMITIES:  2+ Peripheral Pulses, No clubbing, cyanosis,   edema  MUSCULOSKELETAL:- No muscle tenderness, Muscle tone normal, No joint tenderness, no Joint swelling,  Joint ROM –normal   SKIN-no rash, no lesion, RUE swelling noted     LABS: all reviewed                        8.3    1.05  )-----------( 316      ( 13 May 2021 06:55 )             25.4   Vitamin B12, Serum in AM (21 @ 06:55)    Vitamin B12, Serum: 952 pg/mL        134<L>  |  104  |  16  ----------------------------<  93  3.6   |  23  |  0.77    Ca    8.0<L>      13 May 2021 06:55  Phos  2.4       Mg     2.0         TPro  5.3<L>  /  Alb  2.1<L>  /  TBili  0.3  /  DBili  x   /  AST  89<H>  /  ALT  69  /  AlkPhos  166<H>      PT/INR - ( 13 May 2021 06:55 )   PT: 11.2 sec;   INR: 0.96 ratio         PTT - ( 13 May 2021 06:55 )  PTT:22.6 sec    CARDIAC MARKERS ( 13 May 2021 06:55 )  0.045 ng/mL / x     / 116 U/L / x     / x          Urinalysis Basic - ( 13 May 2021 00:56 )    Color: Yellow / Appearance: Slightly Turbid / S.010 / pH: x  Gluc: x / Ketone: Negative  / Bili: Negative / Urobili: Negative mg/dL   Blood: x / Protein: 15 mg/dL / Nitrite: Negative   Leuk Esterase: Negative / RBC: 3-5 /HPF / WBC 0-2   Sq Epi: x / Non Sq Epi: Occasional / Bacteria: Occasional    RECENT CULTURES:    RADIOLOGY & ADDITIONAL TESTS: all reviewed EKG reviewed  < from: 12 Lead ECG (21 @ 17:46) >  Ventricular Rate 75 BPM    Atrial Rate 75 BPM    P-R Interval 128 ms    QRS Duration 84 ms    Q-T Interval 384 ms    QTC Calculation(Bazett) 428 ms    P Axis 44 degrees    R Axis 51 degrees    T Axis 33 degrees    Diagnosis Line Normal sinus rhythm  Nonspecific T wave abnormality  Abnormal ECG  When compared with ECG of 31-DEC-2020 10:02,  Nonspecific T wave abnormality now evident in Inferior leads    < end of copied text >  < from: CT Angio Chest PE Protocol w/ IV Cont (21 @ 19:10) >  FINDINGS:    LUNGS AND AIRWAYS: Patent central airways.  Mild emphysematous changes. Biapical scarring, greater on the right. Atelectatic changes.  PLEURA: Small right pleural effusion.  MEDIASTINUM AND TASHIA: No lymphadenopathy.  VESSELS: Within normal limits. No pulmonary emboli visualized.  HEART: Heart size is normal. No pericardial effusion.  CHEST WALL AND LOWER NECK: Status post right mastectomy and axillary node dissection. Prominent soft tissue adjacent to the right axillary clips. This may represent postsurgical changes although no active neoplasm cannot be excluded. Please correlate clinically. Right breast prosthesis with tissue expander. Overlying skin thickening and subcutaneous edema. Small fluid collection along the lateral inferior aspect.  VISUALIZED UPPER ABDOMEN: 2.8 cm cyst in the upper pole of the right kidney.  BONES: Minimal degenerative changes.    IMPRESSION:  No pulmonary emboli. The graft small right pleural effusion. Mild COPD.Biapical scarring.    Prominent soft tissue adjacent to the right axillary clips. This may represent postsurgical changes although tumor cannot be excluded. Small fluid adjacent to the outer inferior aspect of the right breast prosthesis.      < end of copied text >  < from: Xray Chest 1 View AP/PA. (21 @ 18:09) >  FINDINGS:    The lungs show mild haziness RIGHT lung base which may indicate small RIGHT effusion. Remaining visualized lung parenchyma clear.. No pneumothorax.    The heart and mediastinum are withinnormal limits. Atherosclerotic calcified intimal wall plaques within nondilated thoracic aorta    Visualized osseous structures are intact.      IMPRESSION:   Suspect a small RIGHT effusion and/or basilar airspace disease...  Confirmatory lateral radiograph recommended.    < end of copied text >    Current medications:  aspirin enteric coated 81 milliGRAM(s) Oral daily  atorvastatin 80 milliGRAM(s) Oral at bedtime  desvenlafaxine  milliGRAM(s) Oral daily  enoxaparin Injectable 40 milliGRAM(s) SubCutaneous daily  filgrastim-sndz (ZARXIO) Injectable 300 MICROGram(s) SubCutaneous daily  magnesium citrate Oral Solution 1 Bottle Oral once  ondansetron Injectable 4 milliGRAM(s) IV Push every 6 hours PRN  pantoprazole    Tablet 40 milliGRAM(s) Oral before breakfast  potassium phosphate / sodium phosphate Powder (PHOS-NaK) 1 Packet(s) Oral three times a day before meals  sodium chloride 0.9%. 1000 milliLiter(s) IV Continuous <Continuous>  traZODone 300 milliGRAM(s) Oral at bedtime

## 2021-05-13 NOTE — PHYSICAL THERAPY INITIAL EVALUATION ADULT - MODALITIES TREATMENT COMMENTS
pt left in bed supine post Eval @ pt request; bed alarm on; IV intact; RN Neel present; callbell in reach; pt instructed not to get up alone; call nursing for assist; obdulia well; denied pain

## 2021-05-13 NOTE — PROVIDER CONTACT NOTE (OTHER) - ASSESSMENT
right arm swollen, no redness noted, no pain noted just swelling. Right arm more swollen than left arm
pt BP 90/38, same as before  cc bolus given. pt asymptomatic

## 2021-05-13 NOTE — DIETITIAN INITIAL EVALUATION ADULT. - OTHER INFO
77 yo F with a PMH CAD s/p stent, depression, HLD, breast cancer s/p RT/mastectomy/lumpectomy (on chemotherapy), and insomnia who presents with weakness. She has been on chemotherapy every 21 days for the past few months, last one on 5/4 (9 days ago). She had tolerated chemotherapy but a couple days after the last treatment, she developed severe weakness. She has since had tremendous difficulty with walking or other minimal exertion. She denies dizziness while walking, fevers, chills, cough, chest pain, abdominal pain, nausea, vomiting, diarrhea, blood in her stool or urine, dysuria, or rash/swelling. She states her next (and so far last) treatment was scheduled for 5/25.  Her breast cancer, according to paperwork, is stage IIB.  in ED, BCx x 2 sets were drawn and received by lab.  given Unasyn and Lasix 40mg iv.    previous admitted at  from 04/18-04/26 due to acute hypoxic/hypercarbic respiratory failure, acute pulmonary edema and JAIMEE. 77 yo F with a PMH CAD s/p stent, depression, HLD, breast cancer s/p RT/mastectomy/lumpectomy (on chemotherapy), and insomnia who presents with weakness. She has been on chemotherapy every 21 days for the past few months, last one on 5/4 (9 days ago). She had tolerated chemotherapy but a couple days after the last treatment, she developed severe weakness. She has since had tremendous difficulty with walking or other minimal exertion. She denies dizziness while walking, fevers, chills, cough, chest pain, abdominal pain, nausea, vomiting, diarrhea, blood in her stool or urine, dysuria, or rash/swelling. She states her next (and so far last) treatment was scheduled for 5/25.  Her breast cancer, according to paperwork, is stage IIB.  in ED, BCx x 2 sets were drawn and received by lab.  given Unasyn and Lasix 40mg iv.    previous admitted at  from 04/18-04/26 due to acute hypoxic/hypercarbic respiratory failure, acute pulmonary edema and JAIMEE.  Pt interviewed in mid-afternoon.  Reports no issues chew/swallow, no GI issues.  Good appetite. Somewhat reluctant to talk.  Does not know wt hx.

## 2021-05-13 NOTE — CONSULT NOTE ADULT - SUBJECTIVE AND OBJECTIVE BOX
Patient is a 76y old  Female who presents with a chief complaint of acute leukopenia, weakness (13 May 2021 12:51)      HPI:  77 yo F with a PMH CAD s/p stent, depression, HLD, breast cancer s/p RT/mastectomy/lumpectomy (on chemotherapy), and insomnia who presents with weakness. She has been on chemotherapy every 21 days for the past few months, last one on  (9 days ago). She had tolerated chemotherapy but a couple days after the last treatment, she developed severe weakness. She has since had tremendous difficulty with walking or other minimal exertion. She denies dizziness while walking, fevers, chills, cough, chest pain, abdominal pain, nausea, vomiting, diarrhea, blood in her stool or urine, dysuria, or rash/swelling. She states her next (and so far last) treatment was scheduled for .  Her breast cancer, according to paperwork, is stage IIB.    In the ED, she was given Trazodone 300 mg PO x1 and NS 1L x1. (12 May 2021 23:46)      PAST MEDICAL & SURGICAL HISTORY:  Depression    Hiatal hernia    Lumbar radiculopathy    Lumbar spondylosis    History of coronary artery disease  s/p stent    Breast cancer  left breast- lumpectomy and mastectomy, and radiation - CURRENTLY RIGHT BREAST IDC    HLD (hyperlipidemia)    H/O: hysterectomy    H/O heart artery stent    H/O lumpectomy    H/O left mastectomy        PREVIOUS CARDIAC WORKUP:      Echo:   CONCLUSION:  --The basal inferolateral segment is mildly hypokinetic.  --LV ejection fraction (57 %) is normal.  --There is mild to moderate mitral regurgitation.  --There is mild tricuspid regurgitation.  --The right atrial pressure is normal (0 - 5 mm Hg). There is no pulmonary hypertension.  --There is no pericardial effusion.    Stress Test:      ·	There is a medium size defect, of severe severity, in the basal anterolateral, basal inferolateral and mid inferolateral wall(s), that is fixed, consistent with infarction.  ·	Rest gated analysis showed overall normal left ventricular function with normal LV size.  ·	Post-stress analysis showed overall normal LV function.  ·	Wall motion was abnormal. Inferolateral hypokinesis.  ·	Overall impression: Abnormal.  ·	Summary: Left ventricular perfusion is abnormal.  ·	No ECG evidence of ischemia after administration of IV regadenoson.  ·	SPECT results are abnormal and consistent with old inferolateral myocardial infarction.      Cardiac Cath:  [facility]I [facility] the [facility]      ALLERGIES:    codeine (Rash)  penicillins (Unknown)  predniSONE (Unknown)       MEDICATIONS  (STANDING):  aspirin enteric coated 81 milliGRAM(s) Oral daily  atorvastatin 80 milliGRAM(s) Oral at bedtime  desvenlafaxine  milliGRAM(s) Oral daily  enoxaparin Injectable 40 milliGRAM(s) SubCutaneous daily  filgrastim-sndz (ZARXIO) Injectable 300 MICROGram(s) SubCutaneous daily  magnesium citrate Oral Solution 1 Bottle Oral once  pantoprazole    Tablet 40 milliGRAM(s) Oral before breakfast  potassium phosphate / sodium phosphate Powder (PHOS-NaK) 1 Packet(s) Oral three times a day before meals  sodium chloride 0.9%. 1000 milliLiter(s) (75 mL/Hr) IV Continuous <Continuous>  traZODone 300 milliGRAM(s) Oral at bedtime    MEDICATIONS  (PRN):  ondansetron Injectable 4 milliGRAM(s) IV Push every 6 hours PRN Nausea and/or Vomiting      FAMILY HISTORY:  FH: heart disease  father          SOCIAL HISTORY:  .scl     ROS:     .ros    Vital Signs Last 24 Hrs  T(C): 36.8 (13 May 2021 08:57), Max: 37.3 (13 May 2021 01:38)  T(F): 98.2 (13 May 2021 08:57), Max: 99.1 (13 May 2021 01:38)  HR: 82 (13 May 2021 08:57) (73 - 102)  BP: 95/46 (13 May 2021 08:57) (87/51 - 111/62)  BP(mean): 69 (12 May 2021 19:37) (69 - 69)  RR: 17 (13 May 2021 08:57) (15 - 17)  SpO2: 95% (13 May 2021 08:57) (95% - 100%)    I&O's Summary      PHYSICAL EXAM:    .phy      TELEMETRY:    ECG:    LABS:                          8.3    1.05  )-----------( 316      ( 13 May 2021 06:55 )             25.4     05-13    134<L>  |  104  |  16  ----------------------------<  93  3.6   |  23  |  0.77    Ca    8.0<L>      13 May 2021 06:55  Phos  2.4       Mg     2.0         TPro  5.3<L>  /  Alb  2.1<L>  /  TBili  0.3  /  DBili  x   /  AST  89<H>  /  ALT  69  /  AlkPhos  166<H>   @ 06:55  Trop-I  0.045  CK      116      Pro BNP  876  @ 06:55    PT/INR - ( 13 May 2021 06:55 )   PT: 11.2 sec;   INR: 0.96 ratio    PTT - ( 13 May 2021 06:55 )  PTT:22.6 sec    Urinalysis Basic - ( 13 May 2021 00:56 )    Color: Yellow / Appearance: Slightly Turbid / S.010 / pH: x  Gluc: x / Ketone: Negative  / Bili: Negative / Urobili: Negative mg/dL   Blood: x / Protein: 15 mg/dL / Nitrite: Negative   Leuk Esterase: Negative / RBC: 3-5 /HPF / WBC 0-2   Sq Epi: x / Non Sq Epi: Occasional / Bacteria: Occasional      RADIOLOGY & ADDITIONAL STUDIES:     Patient is a 76y old  Female who presents with a chief complaint of acute leukopenia, weakness (13 May 2021 12:51)      HPI:  75 yo female with acute leukopenia and weakness after chemo for breast cancer. Cardiac history of CAD, s/p YARELI PCI of LCx. Currently no chest pain, no orthopnea She has dyspnea on exertion. She is anemic. No palpitation, no dizziness, no syncope. c/o right arm edema.      PAST MEDICAL & SURGICAL HISTORY:  Depression  Hiatal hernia  Lumbar radiculopathy  Lumbar spondylosis  History of coronary artery disease s/p stent  Breast cancer left breast- lumpectomy and mastectomy, and radiation - CURRENTLY RIGHT BREAST IDC  HLD (hyperlipidemia)  H/O: hysterectomy  H/O lumpectomy  H/O right mastectomy      PREVIOUS CARDIAC WORKUP:      Echo:   CONCLUSION:  --The basal inferolateral segment is mildly hypokinetic.  --LV ejection fraction (57 %) is normal.  --There is mild to moderate mitral regurgitation.  --There is mild tricuspid regurgitation.  --The right atrial pressure is normal (0 - 5 mm Hg). There is no pulmonary hypertension.  --There is no pericardial effusion.    Stress Test:      ·	There is a medium size defect, of severe severity, in the basal anterolateral, basal inferolateral and mid inferolateral wall(s), that is fixed, consistent with infarction.  ·	Rest gated analysis showed overall normal left ventricular function with normal LV size.  ·	Post-stress analysis showed overall normal LV function.  ·	Wall motion was abnormal. Inferolateral hypokinesis.  ·	Overall impression: Abnormal.  ·	Summary: Left ventricular perfusion is abnormal.  ·	No ECG evidence of ischemia after administration of IV regadenoson.  ·	SPECT results are abnormal and consistent with old inferolateral myocardial infarction.      Cardiac Cath:  [facility]      ALLERGIES:    codeine (Rash)  penicillins (Unknown)  predniSONE (Unknown)       MEDICATIONS  (STANDING):  aspirin enteric coated 81 milliGRAM(s) Oral daily  atorvastatin 80 milliGRAM(s) Oral at bedtime  desvenlafaxine  milliGRAM(s) Oral daily  enoxaparin Injectable 40 milliGRAM(s) SubCutaneous daily  filgrastim-sndz (ZARXIO) Injectable 300 MICROGram(s) SubCutaneous daily  magnesium citrate Oral Solution 1 Bottle Oral once  pantoprazole    Tablet 40 milliGRAM(s) Oral before breakfast  potassium phosphate / sodium phosphate Powder (PHOS-NaK) 1 Packet(s) Oral three times a day before meals  sodium chloride 0.9%. 1000 milliLiter(s) (75 mL/Hr) IV Continuous <Continuous>  traZODone 300 milliGRAM(s) Oral at bedtime    MEDICATIONS  (PRN):  ondansetron Injectable 4 milliGRAM(s) IV Push every 6 hours PRN Nausea and/or Vomiting      FAMILY HISTORY:  FH: heart disease  father          SOCIAL HISTORY:  .scl     ROS:     Detailed ten system ROS was performed and was negative except for history as eluded to above.    no fever  no chills  no nausea  no vomiting  no diarrhea  no constipation  no melena  no hematochezia  no chest pain  no palpitations  no sob at rest  no dyspnea on exertion  no cough  no wheezing  no anorexia  no headache  no dizziness  no syncope  + weakness  no myalgia  no dysuria  no polyuria  no hematuria     Vital Signs Last 24 Hrs  T(C): 36.8 (13 May 2021 08:57), Max: 37.3 (13 May 2021 01:38)  T(F): 98.2 (13 May 2021 08:57), Max: 99.1 (13 May 2021 01:38)  HR: 82 (13 May 2021 08:57) (73 - 102)  BP: 95/46 (13 May 2021 08:57) (87/51 - 111/62)  BP(mean): 69 (12 May 2021 19:37) (69 - 69)  RR: 17 (13 May 2021 08:57) (15 - 17)  SpO2: 95% (13 May 2021 08:57) (95% - 100%)      PHYSICAL EXAM:    General:                Comfortable, AAO X 3, in no distress.  HEENT:                  Atraumatic, PERRLA, EOMI, conjunctiva clear.    Neck:                     Supple, no adenopathy, no thyromegaly, no JVD, no bruit.  Chest:                    Clear, B/L symmetric air entry, no tachypnea  Heart:                     S1, S2 normal, + murmur.  Abdomen:              Soft, non-tender, bowel sounds active. No palpable masses.  Extremities:           right arm edema. Peripheral pulses normal.  Skin:                      Skin color, texture normal. No rashes.  Neurologic:            Grossly nonfocal.       ECG:   NSR, non specific ST changes    LABS:                          8.3    1.05  )-----------( 316      ( 13 May 2021 06:55 )             25.4         134<L>  |  104  |  16  ----------------------------<  93  3.6   |  23  |  0.77    Ca    8.0<L>      13 May 2021 06:55  Phos  2.4       Mg     2.0         TPro  5.3<L>  /  Alb  2.1<L>  /  TBili  0.3  /  DBili  x   /  AST  89<H>  /  ALT  69  /  AlkPhos  166<H>   @ 06:55  Trop-I  0.045  CK      116      Pro BNP  876  @ 06:55    PT/INR - ( 13 May 2021 06:55 )   PT: 11.2 sec;   INR: 0.96 ratio    PTT - ( 13 May 2021 06:55 )  PTT:22.6 sec    Urinalysis Basic - ( 13 May 2021 00:56 )    Color: Yellow / Appearance: Slightly Turbid / S.010 / pH: x  Gluc: x / Ketone: Negative  / Bili: Negative / Urobili: Negative mg/dL   Blood: x / Protein: 15 mg/dL / Nitrite: Negative   Leuk Esterase: Negative / RBC: 3-5 /HPF / WBC 0-2   Sq Epi: x / Non Sq Epi: Occasional / Bacteria: Occasional      RADIOLOGY & ADDITIONAL STUDIES:    < from: CT Angio Chest PE Protocol w/ IV Cont (21 @ 19:10) >  IMPRESSION:  No pulmonary emboli. Small right pleural effusion. Mild COPD. Biapical scarring.  Prominent soft tissue adjacent to the right axillary clips. This may represent postsurgical changes although tumor cannot be excluded. Small fluid adjacent to the outer inferior aspect of the right breast prosthesis.    < from: Xray Chest 1 View AP/PA. (21 @ 18:09) >  IMPRESSION:   Suspect a small RIGHT effusion and/or basilar airspace disease...  Confirmatory lateral radiograph recommended.

## 2021-05-13 NOTE — DIETITIAN INITIAL EVALUATION ADULT. - PERTINENT LABORATORY DATA
05-13 Na134 mmol/L<L> Glu 93 mg/dL K+ 3.6 mmol/L Cr  0.77 mg/dL BUN 16 mg/dL Phos 2.4 mg/dL<L> Alb 2.1 g/dL<L> PAB n/a       BMI: BMI (kg/m2): 24.8 (05-12-21 @ 17:26)  HbA1c:   Glucose:   BP: 95/46 (05-13-21 @ 08:57) (94/47 - 111/62)  Lipid Panel:

## 2021-05-13 NOTE — ED ADULT NURSE REASSESSMENT NOTE - NS ED NURSE REASSESS COMMENT FT1
Pt with c/o of not being able to void.  Bladder scan showed 675 cc.  Pt straight cathed - voided 750cc.

## 2021-05-13 NOTE — PROVIDER CONTACT NOTE (OTHER) - ACTION/TREATMENT ORDERED:
elevate arm on pillow and MD will be down to assess the patient
MD aware, blood pressure to be rechecked at midnight. no further orders as this time

## 2021-05-13 NOTE — PROGRESS NOTE ADULT - ASSESSMENT
75 yo F with a PMH CAD s/p stent, depression, HLD, breast cancer s/p RT/mastectomy/lumpectomy (on chemotherapy), and insomnia who presents with weakness.    1/2/3) Failure to Thrive in Adult/Acute on chronic anemia/Neutropenia  - Likely due to chemotherapy, last dose 5/14  - Low suspicion of acute infection, no specific pain, hold off on antibiotics for now  - With worsening anemia/leukopenia,   - COIVID-19 PCR result negative  - F/u vitals, trend for fevers  - Check UA to r/o UTI  - Check iron studies, ferritin, B12, folate, reticulocyte count  - Check fecal occult  - Hematology/Oncology consult  - IVFs, encourage PO intake  - PT eval    4) Stage II breast cancer  - S/p right mastectomy  - On chemotherapy last dose 5/4, next planned for 5/25  - Supportive therapy, including PPI, anti-emetics  - Hematology/Oncology consult    5) Acute urinary retention  - Was not urinating on PureWick  - Bladder scan showed 750 ml; now s/p straight cath  - Recheck TOV, if unable to, will place [place]  - Renal function intact  - IVFs    6) Depression  - C/w Desvenlafaxine 100 mg QD  - C/w Trazodone 300 mg QHS    7) HLD  - C/w Atorvastatin equivalent dose of Rosuvastatin 40 mg QD and aspirin 81 mg QD    8) Prophylactic measure  - DVT PPX: IMPROVE score of 3, will give Lovenox 40 mg SQ QD  - Diet: DASH  - Dispo: pending improvement in sxs 75 yo F with a PMH CAD s/p stent, depression, HLD, breast cancer s/p RT/mastectomy/lumpectomy (on chemotherapy), and insomnia who presents with weakness.    Generalized weakness   - Likely due to chemotherapy, last dose 5/14   - Low suspicion of acute infection, no specific pain, hold off on antibiotics for now  - IVFs, encourage PO intake, c/w vit D   - PT eval   Severe  Neutropenia  - With worsening anemia/leukopenia,  , start Zarxio   - monitor for infection, contact precautions  Acute on chronic anemia, normocytic , ACD   - Check iron studies, ferritin, B12, folate, reticulocyte count  - Check fecal occult   - Hematology/Oncology consult  - c/w  aspirin 81 mg QD for now   Stage II breast cancer s/p right mastectomy  - On chemotherapy last dose 5/4, next planned for 5/25  - Supportive therapy, including PPI, anti-emetics  - Hematology/Oncology consult  Hyponatremia hypovolemic - c/w IV fluids  Acute urinary retention likely due to constipation   - Was not urinating on PureWick  - Bladder scan showed 750 ml; now s/p straight cath  - Recheck TOV,  Renal function intact  Transaminitis mild due to adverse drug effect    - could be due to taxotere.   - repeat in am with lipase   Depression, worsening  - C/w Desvenlafaxine 100 mg QD  - C/w Trazodone 300 mg QHS  - psychiatry consult  RUE swelling , likely dependent edema - albumin 2.1   - doppler - no DVT   HLD - C/w Atorvastatin equivalent dose of Rosuvastatin 40 mg QD     DVT PPX: IMPROVE score of 3, will give Lovenox 40 mg SQ QD    Advanced directives   Full code  skip [place] [phone] updated  5/13/21   77 yo F with a PMH CAD s/p stent, depression, HLD, breast cancer s/p RT/mastectomy/lumpectomy (on chemotherapy), and insomnia who presents with weakness.    Generalized weakness   - Likely due to chemotherapy, last dose 5/14   - Low suspicion of acute infection, no specific pain, hold off on antibiotics for now  - IVFs, encourage PO intake, c/w vit D   - PT eval   Severe  Neutropenia  - With worsening anemia/leukopenia,  , start Zarxio   - monitor for infection, contact precautions  Acute on chronic anemia, normocytic , ACD   - Check iron studies, ferritin, B12, folate, reticulocyte count  - Check fecal occult   - Hematology/Oncology consult  - c/w  aspirin 81 mg QD for now   Dyspnea on exertion ruled out PE , likely due to anemia, deconditioning   Abnormal EKG , elevated BNP  - cardiology consult for further work-up - ? need for 2 d echo  - troponin x 1 neg, repeat in am  Stage II breast cancer s/p right mastectomy  - On chemotherapy last dose 5/4, next planned for 5/25  - Supportive therapy, including PPI, anti-emetics  - Hematology/Oncology consult  Hyponatremia hypovolemic - c/w IV fluids  Acute urinary retention likely due to constipation   - Was not urinating on PureWick  - Bladder scan showed 750 ml; now s/p straight cath  - Recheck TOV,  Renal function intact  - laxatives, send FOBT   Transaminitis mild due to adverse drug effect    - could be due to taxotere.   - repeat in am with lipase   Depression, worsening  - C/w Desvenlafaxine 100 mg QD  - C/w Trazodone 300 mg QHS  - psychiatry consult  RUE swelling , likely dependent edema - albumin 2.1   - doppler - no DVT   HLD - C/w Atorvastatin equivalent dose of Rosuvastatin 40 mg QD     DVT PPX: IMPROVE score of 3, will give Lovenox 40 mg SQ QD    Advanced directives   Full code  skip [place] [phone] updated  5/13/21

## 2021-05-14 ENCOUNTER — TRANSCRIPTION ENCOUNTER (OUTPATIENT)
Age: 76
End: 2021-05-14

## 2021-05-14 DIAGNOSIS — F33.40 MAJOR DEPRESSIVE DISORDER, RECURRENT, IN REMISSION, UNSPECIFIED: ICD-10-CM

## 2021-05-14 LAB
ALBUMIN SERPL ELPH-MCNC: 1.8 G/DL — LOW (ref 3.3–5)
ALP SERPL-CCNC: 122 U/L — HIGH (ref 40–120)
ALT FLD-CCNC: 41 U/L — SIGNIFICANT CHANGE UP (ref 12–78)
ANION GAP SERPL CALC-SCNC: 11 MMOL/L — SIGNIFICANT CHANGE UP (ref 5–17)
AST SERPL-CCNC: 36 U/L — SIGNIFICANT CHANGE UP (ref 15–37)
BASOPHILS # BLD AUTO: 0 K/UL — SIGNIFICANT CHANGE UP (ref 0–0.2)
BASOPHILS NFR BLD AUTO: 0 % — SIGNIFICANT CHANGE UP (ref 0–2)
BILIRUB SERPL-MCNC: 0.4 MG/DL — SIGNIFICANT CHANGE UP (ref 0.2–1.2)
BUN SERPL-MCNC: 12 MG/DL — SIGNIFICANT CHANGE UP (ref 7–23)
CALCIUM SERPL-MCNC: 7.7 MG/DL — LOW (ref 8.5–10.1)
CHLORIDE SERPL-SCNC: 108 MMOL/L — SIGNIFICANT CHANGE UP (ref 96–108)
CHOLEST SERPL-MCNC: 125 MG/DL — SIGNIFICANT CHANGE UP
CO2 SERPL-SCNC: 20 MMOL/L — LOW (ref 22–31)
CREAT SERPL-MCNC: 0.62 MG/DL — SIGNIFICANT CHANGE UP (ref 0.5–1.3)
EOSINOPHIL # BLD AUTO: 0.2 K/UL — SIGNIFICANT CHANGE UP (ref 0–0.5)
EOSINOPHIL NFR BLD AUTO: 8 % — HIGH (ref 0–6)
GLUCOSE SERPL-MCNC: 78 MG/DL — SIGNIFICANT CHANGE UP (ref 70–99)
HCT VFR BLD CALC: 25.1 % — LOW (ref 34.5–45)
HDLC SERPL-MCNC: 18 MG/DL — LOW
HGB BLD-MCNC: 8.1 G/DL — LOW (ref 11.5–15.5)
LIDOCAIN IGE QN: 132 U/L — SIGNIFICANT CHANGE UP (ref 73–393)
LIPID PNL WITH DIRECT LDL SERPL: 78 MG/DL — SIGNIFICANT CHANGE UP
LYMPHOCYTES # BLD AUTO: 0.66 K/UL — LOW (ref 1–3.3)
LYMPHOCYTES # BLD AUTO: 26 % — SIGNIFICANT CHANGE UP (ref 13–44)
MAGNESIUM SERPL-MCNC: 1.6 MG/DL — SIGNIFICANT CHANGE UP (ref 1.6–2.6)
MCHC RBC-ENTMCNC: 29.6 PG — SIGNIFICANT CHANGE UP (ref 27–34)
MCHC RBC-ENTMCNC: 32.3 GM/DL — SIGNIFICANT CHANGE UP (ref 32–36)
MCV RBC AUTO: 91.6 FL — SIGNIFICANT CHANGE UP (ref 80–100)
MONOCYTES # BLD AUTO: 0.46 K/UL — SIGNIFICANT CHANGE UP (ref 0–0.9)
MONOCYTES NFR BLD AUTO: 18 % — HIGH (ref 2–14)
NEUTROPHILS # BLD AUTO: 1.19 K/UL — LOW (ref 1.8–7.4)
NEUTROPHILS NFR BLD AUTO: 36 % — LOW (ref 43–77)
NON HDL CHOLESTEROL: 106 MG/DL — SIGNIFICANT CHANGE UP
NRBC # BLD: SIGNIFICANT CHANGE UP /100 WBCS (ref 0–0)
PHOSPHATE SERPL-MCNC: 2.5 MG/DL — SIGNIFICANT CHANGE UP (ref 2.5–4.5)
PLATELET # BLD AUTO: 341 K/UL — SIGNIFICANT CHANGE UP (ref 150–400)
POTASSIUM SERPL-MCNC: 3 MMOL/L — LOW (ref 3.5–5.3)
POTASSIUM SERPL-SCNC: 3 MMOL/L — LOW (ref 3.5–5.3)
PROT SERPL-MCNC: 4.6 GM/DL — LOW (ref 6–8.3)
RBC # BLD: 2.74 M/UL — LOW (ref 3.8–5.2)
RBC # FLD: 16.7 % — HIGH (ref 10.3–14.5)
SODIUM SERPL-SCNC: 139 MMOL/L — SIGNIFICANT CHANGE UP (ref 135–145)
TRIGL SERPL-MCNC: 140 MG/DL — SIGNIFICANT CHANGE UP
TROPONIN I SERPL-MCNC: 0.02 NG/ML — SIGNIFICANT CHANGE UP (ref 0.01–0.04)
WBC # BLD: 2.53 K/UL — LOW (ref 3.8–10.5)
WBC # FLD AUTO: 2.53 K/UL — LOW (ref 3.8–10.5)

## 2021-05-14 PROCEDURE — 99232 SBSQ HOSP IP/OBS MODERATE 35: CPT

## 2021-05-14 PROCEDURE — 99233 SBSQ HOSP IP/OBS HIGH 50: CPT

## 2021-05-14 RX ORDER — LOPERAMIDE HCL 2 MG
2 TABLET ORAL ONCE
Refills: 0 | Status: COMPLETED | OUTPATIENT
Start: 2021-05-14 | End: 2021-05-14

## 2021-05-14 RX ORDER — ACETAMINOPHEN 500 MG
1000 TABLET ORAL ONCE
Refills: 0 | Status: COMPLETED | OUTPATIENT
Start: 2021-05-14 | End: 2021-05-14

## 2021-05-14 RX ORDER — POTASSIUM CHLORIDE 20 MEQ
20 PACKET (EA) ORAL
Refills: 0 | Status: COMPLETED | OUTPATIENT
Start: 2021-05-14 | End: 2021-05-14

## 2021-05-14 RX ORDER — MAGNESIUM HYDROXIDE 400 MG/1
30 TABLET, CHEWABLE ORAL DAILY
Refills: 0 | Status: DISCONTINUED | OUTPATIENT
Start: 2021-05-14 | End: 2021-05-14

## 2021-05-14 RX ORDER — POLYETHYLENE GLYCOL 3350 17 G/17G
17 POWDER, FOR SOLUTION ORAL ONCE
Refills: 0 | Status: COMPLETED | OUTPATIENT
Start: 2021-05-14 | End: 2021-05-14

## 2021-05-14 RX ORDER — MULTIVIT WITH MIN/MFOLATE/K2 340-15/3 G
1 POWDER (GRAM) ORAL ONCE
Refills: 0 | Status: COMPLETED | OUTPATIENT
Start: 2021-05-14 | End: 2021-05-14

## 2021-05-14 RX ADMIN — Medication 20 MILLIEQUIVALENT(S): at 18:29

## 2021-05-14 RX ADMIN — Medication 1 BOTTLE: at 13:58

## 2021-05-14 RX ADMIN — Medication 300 MILLIGRAM(S): at 23:51

## 2021-05-14 RX ADMIN — Medication 20 MILLIEQUIVALENT(S): at 13:58

## 2021-05-14 RX ADMIN — POLYETHYLENE GLYCOL 3350 17 GRAM(S): 17 POWDER, FOR SOLUTION ORAL at 09:44

## 2021-05-14 RX ADMIN — Medication 1 PACKET(S): at 06:02

## 2021-05-14 RX ADMIN — MONTELUKAST 10 MILLIGRAM(S): 4 TABLET, CHEWABLE ORAL at 09:45

## 2021-05-14 RX ADMIN — Medication 2000 UNIT(S): at 09:44

## 2021-05-14 RX ADMIN — Medication 400 MILLIGRAM(S): at 23:12

## 2021-05-14 RX ADMIN — DESVENLAFAXINE 100 MILLIGRAM(S): 50 TABLET, EXTENDED RELEASE ORAL at 09:44

## 2021-05-14 RX ADMIN — Medication 81 MILLIGRAM(S): at 09:44

## 2021-05-14 RX ADMIN — ENOXAPARIN SODIUM 40 MILLIGRAM(S): 100 INJECTION SUBCUTANEOUS at 09:45

## 2021-05-14 RX ADMIN — Medication 300 MICROGRAM(S): at 18:29

## 2021-05-14 RX ADMIN — ATORVASTATIN CALCIUM 80 MILLIGRAM(S): 80 TABLET, FILM COATED ORAL at 22:16

## 2021-05-14 RX ADMIN — Medication 20 MILLIEQUIVALENT(S): at 16:39

## 2021-05-14 RX ADMIN — PANTOPRAZOLE SODIUM 40 MILLIGRAM(S): 20 TABLET, DELAYED RELEASE ORAL at 09:44

## 2021-05-14 RX ADMIN — Medication 2 MILLIGRAM(S): at 23:12

## 2021-05-14 NOTE — BEHAVIORAL HEALTH ASSESSMENT NOTE - HPI (INCLUDE ILLNESS QUALITY, SEVERITY, DURATION, TIMING, CONTEXT, MODIFYING FACTORS, ASSOCIATED SIGNS AND SYMPTOMS)
76 year-old  female, with history of depression, no suicide attempt, presenting for weakness; consulted for "worsening depression." Patient is alert and oriented to person, time, place and situation. Patient reports mood is stable, stating "I am fine; everything is okay; I am doing / feeling better." Patient presenting euthymic, reactive, smiling appropriately. Denies depressed mood or anhedonia, hopelessness or suicidal ideation. Denies manic / psychotic symptoms including irritability, mood lability, insomnia, auditory / visual hallucinations, paranoia. No delusions elicited. Denies disturbances in sleep / appetite. Tolerating medications with no side effects; none observed.

## 2021-05-14 NOTE — BEHAVIORAL HEALTH ASSESSMENT NOTE - NSBHREFERDETAILS_PSY_A_CORE_FT
77 yo F with a PMH CAD s/p stent, depression, HLD, breast cancer s/p RT/mastectomy/lumpectomy (on chemotherapy), and insomnia who presents with weakness. She has been on chemotherapy every 21 days for the past few months, last one on 5/4 (9 days ago). She had tolerated chemotherapy but a couple days after the last treatment, she developed severe weakness. She has since had tremendous difficulty with walking or other minimal exertion. She denies dizziness while walking, fevers, chills, cough, chest pain, abdominal pain, nausea, vomiting, diarrhea, blood in her stool or urine, dysuria, or rash/swelling. She states her next (and so far last) treatment was scheduled for 5/25.  Her breast cancer, according to paperwork, is stage IIB.    In the ED, she was given Trazodone 300 mg PO x1 and NS 1L x1.

## 2021-05-14 NOTE — DISCHARGE NOTE NURSING/CASE MANAGEMENT/SOCIAL WORK - PATIENT PORTAL LINK FT
You can access the FollowMyHealth Patient Portal offered by [facility] by registering at the following website: [email or web address]. By joining [facility]’s FollowMyHealth portal, you will also be able to view your health information using other applications (apps) compatible with our system.

## 2021-05-14 NOTE — PROGRESS NOTE ADULT - SUBJECTIVE AND OBJECTIVE BOX
Subjective:  Patient is a 76y old  Female who presents with a chief complaint of acute leukopenia, weakness      HPI:  75 yo F with a PMH CAD s/p stent, depression, HLD, breast cancer s/p RT/mastectomy/lumpectomy (on chemotherapy), and insomnia who presents on 21 with weakness. She has been on chemotherapy every 21 days for the past few months, last one on  (9 days ago). She had tolerated chemotherapy but a couple days after the last treatment, she developed severe weakness. She has since had tremendous difficulty with walking or other minimal exertion. She denies dizziness while walking, fevers, chills, cough, chest pain, abdominal pain, nausea, vomiting, diarrhea, blood in her stool or urine, dysuria, or rash/swelling. She states her next (and so far last) treatment was scheduled for .  Her breast cancer, according to paperwork, is stage IIB.  In the ED, she was given Trazodone 300 mg PO x1 and NS 1L x1.       - patient seen and examined at bedside earlier today, + gen weakness,  dyspnea on exertion, + constipation for many days, POC discussed     - pt seen and examined, denies cp, dyspnea resolved, afebrile, weakness improving, + constipation, POC discussed     Review of system- Rest of the review of system are negative except mentioned in HPI    Objective:   T(C): 37.2 (21 @ 15:55), Max: 37.3 (21 @ 00:10)  T(F): 99 (21 @ 15:55), Max: 99.2 (21 @ 00:10)  HR: 94 (21 @ 08:39) (83 - 113)  BP: 90/49 (21 @ 15:55) (90/37 - 118/50)  RR: 18 (21 @ 15:55) (17 - 18)  SpO2: 94% (21 @ 15:55) (92% - 98%)  Wt(kg): --    Physical exam:   GENERAL: NAD  NERVOUS SYSTEM:  Alert & Oriented X3, non- focal exam, Motor Strength 5/5 B/L upper and lower extremities; DTRs 2+ intact and symmetric  HEAD:  Atraumatic, Normocephalic  EYES: EOMI, PERRLA, conjunctiva and sclera clear  HEENT: Moist mucous membranes  NECK: Supple, No JVD  CHEST/LUNG: Clear to auscultation bilaterally; No rales, no rhonchi, no wheezing  HEART: Regular rate and rhythm; No murmurs, no rubs or gallops  ABDOMEN: Soft, Non-tender, Non-distended; Bowel sounds present  GENITOURINARY- Voiding, no suprapubic tenderness  EXTREMITIES:  2+ Peripheral Pulses, No clubbing, cyanosis,   edema  MUSCULOSKELETAL:- No muscle tenderness, Muscle tone normal, No joint tenderness, no Joint swelling,  Joint ROM –normal   SKIN-no rash, no lesion, RUE swelling persist     LABS: all reviewed      139  |  108  |  12  ----------------------------<  78  3.0<L>   |  20<L>  |  0.62    Ca    7.7<L>      14 May 2021 10:41  Phos  2.5       Mg     1.6         TPro  4.6<L>  /  Alb  1.8<L>  /  TBili  0.4  /  DBili  x   /  AST  36  /  ALT  41  /  AlkPhos  122<H>                          8.1    2.53  )-----------( 341      ( 14 May 2021 10:41 )             25.1     CARDIAC MARKERS ( 14 May 2021 10:41 )  0.021 ng/mL / x     / x     / x     / x      CARDIAC MARKERS ( 13 May 2021 06:55 )  0.045 ng/mL / x     / 116 U/L / x     / x            LIVER FUNCTIONS - ( 14 May 2021 10:41 )  Alb: 1.8 g/dL / Pro: 4.6 gm/dL / ALK PHOS: 122 U/L / ALT: 41 U/L / AST: 36 U/L / GGT: x             PT/INR - ( 13 May 2021 06:55 )   PT: 11.2 sec;   INR: 0.96 ratio         PTT - ( 13 May 2021 06:55 )  PTT:22.6 sec    Urinalysis Basic - ( 13 May 2021 00:56 )    Color: Yellow / Appearance: Slightly Turbid / S.010 / pH: x  Gluc: x / Ketone: Negative  / Bili: Negative / Urobili: Negative mg/dL   Blood: x / Protein: 15 mg/dL / Nitrite: Negative   Leuk Esterase: Negative / RBC: 3-5 /HPF / WBC 0-2   Sq Epi: x / Non Sq Epi: Occasional / Bacteria: Occasional                            8.3    1.05  )-----------( 316      ( 13 May 2021 06:55 )             25.4   Vitamin B12, Serum in AM (21 @ 06:55)    Vitamin B12, Serum: 952 pg/mL        134<L>  |  104  |  16  ----------------------------<  93  3.6   |  23  |  0.77    Ca    8.0<L>      13 May 2021 06:55  Phos  2.4       Mg     2.0         TPro  5.3<L>  /  Alb  2.1<L>  /  TBili  0.3  /  DBili  x   /  AST  89<H>  /  ALT  69  /  AlkPhos  166<H>      PT/INR - ( 13 May 2021 06:55 )   PT: 11.2 sec;   INR: 0.96 ratio         PTT - ( 13 May 2021 06:55 )  PTT:22.6 sec    CARDIAC MARKERS ( 13 May 2021 06:55 )  0.045 ng/mL / x     / 116 U/L / x     / x          Urinalysis Basic - ( 13 May 2021 00:56 )    Color: Yellow / Appearance: Slightly Turbid / S.010 / pH: x  Gluc: x / Ketone: Negative  / Bili: Negative / Urobili: Negative mg/dL   Blood: x / Protein: 15 mg/dL / Nitrite: Negative   Leuk Esterase: Negative / RBC: 3-5 /HPF / WBC 0-2   Sq Epi: x / Non Sq Epi: Occasional / Bacteria: Occasional    RECENT CULTURES:    RADIOLOGY & ADDITIONAL TESTS: all reviewed EKG reviewed  < from: 12 Lead ECG (21 @ 17:46) >  Ventricular Rate 75 BPM    Atrial Rate 75 BPM    P-R Interval 128 ms    QRS Duration 84 ms    Q-T Interval 384 ms    QTC Calculation(Bazett) 428 ms    P Axis 44 degrees    R Axis 51 degrees    T Axis 33 degrees    Diagnosis Line Normal sinus rhythm  Nonspecific T wave abnormality  Abnormal ECG  When compared with ECG of 31-DEC-2020 10:02,  Nonspecific T wave abnormality now evident in Inferior leads    < end of copied text >  < from: US Duplex Venous Upper Ext Ltd, Right (21 @ 17:51) >  FINDINGS:    The right internal jugular, subclavian, axillary and brachial veins are patent and compressible where applicable.  The basilic and cephalic veins (superficial veins) are patent and without thrombus.    Doppler examination shows normal spontaneous and phasic flow.    IMPRESSION:  No evidence of right upper extremity deep venous thrombosis.      < end of copied text >    < from: CT Angio Chest PE Protocol w/ IV Cont (21 @ 19:10) >  FINDINGS:    LUNGS AND AIRWAYS: Patent central airways.  Mild emphysematous changes. Biapical scarring, greater on the right. Atelectatic changes.  PLEURA: Small right pleural effusion.  MEDIASTINUM AND TASHIA: No lymphadenopathy.  VESSELS: Within normal limits. No pulmonary emboli visualized.  HEART: Heart size is normal. No pericardial effusion.  CHEST WALL AND LOWER NECK: Status post right mastectomy and axillary node dissection. Prominent soft tissue adjacent to the right axillary clips. This may represent postsurgical changes although no active neoplasm cannot be excluded. Please correlate clinically. Right breast prosthesis with tissue expander. Overlying skin thickening and subcutaneous edema. Small fluid collection along the lateral inferior aspect.  VISUALIZED UPPER ABDOMEN: 2.8 cm cyst in the upper pole of the right kidney.  BONES: Minimal degenerative changes.    IMPRESSION:  No pulmonary emboli. The graft small right pleural effusion. Mild COPD.Biapical scarring.    Prominent soft tissue adjacent to the right axillary clips. This may represent postsurgical changes although tumor cannot be excluded. Small fluid adjacent to the outer inferior aspect of the right breast prosthesis.      < end of copied text >  < from: Xray Chest 1 View AP/PA. (21 @ 18:09) >  FINDINGS:    The lungs show mild haziness RIGHT lung base which may indicate small RIGHT effusion. Remaining visualized lung parenchyma clear.. No pneumothorax.    The heart and mediastinum are withinnormal limits. Atherosclerotic calcified intimal wall plaques within nondilated thoracic aorta    Visualized osseous structures are intact.      IMPRESSION:   Suspect a small RIGHT effusion and/or basilar airspace disease...  Confirmatory lateral radiograph recommended.    < end of copied text >    Current medications:  aspirin enteric coated 81 milliGRAM(s) Oral daily  atorvastatin 80 milliGRAM(s) Oral at bedtime  desvenlafaxine  milliGRAM(s) Oral daily  enoxaparin Injectable 40 milliGRAM(s) SubCutaneous daily  filgrastim-sndz (ZARXIO) Injectable 300 MICROGram(s) SubCutaneous daily  magnesium citrate Oral Solution 1 Bottle Oral once  ondansetron Injectable 4 milliGRAM(s) IV Push every 6 hours PRN  pantoprazole    Tablet 40 milliGRAM(s) Oral before breakfast  potassium phosphate / sodium phosphate Powder (PHOS-NaK) 1 Packet(s) Oral three times a day before meals  sodium chloride 0.9%. 1000 milliLiter(s) IV Continuous <Continuous>  traZODone 300 milliGRAM(s) Oral at bedtime

## 2021-05-14 NOTE — BEHAVIORAL HEALTH ASSESSMENT NOTE - NSBHCONSULTMEDS_PSY_A_CORE FT
MEDICATIONS  (STANDING):    desvenlafaxine  milliGRAM(s) Oral daily    traZODone 300 milliGRAM(s) Oral at bedtime

## 2021-05-14 NOTE — BEHAVIORAL HEALTH ASSESSMENT NOTE - RISK ASSESSMENT
LOW RISK     ACUTE RISK FACTORS: acute medical comorbidities     CHRONIC RISK FACTORS: depression    PROTECTIVE FACTORS: no suicidal ideation/intent/plan, no assault ideation/intent/plan or homicidal ideation/intent/plan, medication compliance, future oriented, supportive family    Patient symptoms not indicating imminent risk for harm to self; not warranting involuntary in-patient hospitalization Low Acute Suicide Risk

## 2021-05-14 NOTE — BEHAVIORAL HEALTH ASSESSMENT NOTE - NSBHCHARTREVIEWVS_PSY_A_CORE FT
Vital Signs Last 24 Hrs  T(C): 36.4 (14 May 2021 08:39), Max: 37.3 (14 May 2021 00:10)  T(F): 97.5 (14 May 2021 08:39), Max: 99.2 (14 May 2021 00:10)  HR: 94 (14 May 2021 08:39) (83 - 113)  BP: 96/48 (14 May 2021 08:39) (90/37 - 118/50)  BP(mean): --  RR: 17 (14 May 2021 08:39) (17 - 18)  SpO2: 93% (14 May 2021 08:39) (92% - 98%)

## 2021-05-14 NOTE — BEHAVIORAL HEALTH ASSESSMENT NOTE - NSBHCONSULTFOLLOWAFTERCARE_PSY_A_CORE FT
Patient to follow-up with outpatient therapist/psychiatrist as per scheduled appointment. Patient may continue medications as prescribed by outpatient psychiatrist.

## 2021-05-14 NOTE — PROGRESS NOTE ADULT - ASSESSMENT
77 y/o  female with Hx of CAD s/p sten, HLD, depression, stage II R breast cancer ERE/PA positive HER 2 neg  2021 s/p R mastectomy with  expander  ALND, on adjuvant chemotherapy with taxotere cytoxan ( per Dr Lanier) cycle # 5 on 5/4/21 - now with profound weakness with minimal exertion, poor po intake.  Neutropenia due to chemotherapy ( expected rebeca). Did not have neulasta. On Zarxio.  Anemia due to chemotherapy Check irons.  Fatigue- due to chemo- unusual for TC but possible and cumulative  Slight elevation in LFT's - could be due to taxotere.  No PE .  No infiltrates on CT chest. No fever no cough. Doubt resp infection.   No antibiotics for now.   Encourage nutrition.   Discharge planning.

## 2021-05-14 NOTE — PROGRESS NOTE ADULT - ASSESSMENT
75 yo F with a PMH CAD s/p stent, depression, HLD, breast cancer s/p RT/mastectomy/lumpectomy (on chemotherapy), and insomnia who presents with weakness.    Generalized weakness , improving   - Likely due to chemotherapy, last dose 5/14   - Low suspicion of acute infection, no specific pain, hold off on antibiotics for now  - s/p IVFs will stop , encourage PO intake, c/w vit D   - PT eval   Severe  Neutropenia, improving   - With worsening anemia/leukopenia,  , start Zarxio   - monitor for infection, contact precautions  Acute on chronic anemia, normocytic , ACD   - Check iron studies, ferritin, B12, folate, reticulocyte count - noted   - Check fecal occult  - pending, + constipation  - Hematology/Oncology consult  - c/w  aspirin 81 mg QD for now   Dyspnea on exertion ruled out PE , likely due to anemia, deconditioning   Abnormal EKG , elevated BNP, stable CAD   - cardiology consult - no further work-up needed  - troponin x 1 neg, repeat neg   Stage II breast cancer s/p right mastectomy  - On chemotherapy last dose 5/4, next planned for 5/25  - Supportive therapy, including PPI, anti-emetics  - Hematology/Oncology consult  Hyponatremia hypovolemic , resolved - d/c  IV fluids  Acute urinary retention likely due to constipation   - Was not urinating on PureWick  - Bladder scan showed 750 ml; now s/p straight cath  - Recheck TOV,  Renal function intact  - laxatives magnesium citrate , send FOBT   Transaminitis improving mild due to adverse drug effect    - could be due to taxotere.   - repeat in am with lipase   Depression, worsening  - C/w Desvenlafaxine 100 mg QD  - C/w Trazodone 300 mg QHS  - psychiatry consult  RUE swelling , likely dependent edema - albumin 2.1   - doppler - no DVT   - stop IV fluids   HLD - C/w Atorvastatin equivalent dose of Rosuvastatin 40 mg QD     DVT PPX: IMPROVE score of 3, will give Lovenox 40 mg SQ QD    Advanced directives   Full code  [place] [phone] updated  5/13/21, 5/14

## 2021-05-14 NOTE — BEHAVIORAL HEALTH ASSESSMENT NOTE - SUMMARY
76 year-old  female, with history of depression, no suicide attempt, presenting for weakness; consulted for "worsening depression." Patient is alert and oriented to person, time, place and situation. Patient reports mood is stable, stating "I am fine; everything is okay; I am doing / feeling better." Patient presenting euthymic, reactive, smiling appropriately. Denies depressed mood or anhedonia, hopelessness or suicidal ideation. Denies manic / psychotic symptoms including irritability, mood lability, insomnia, auditory / visual hallucinations, paranoia. No delusions elicited. Denies disturbances in sleep / appetite. Tolerating medications with no side effects; none observed.    Patient presenting with depression in remission. Patient has no acute mood symptoms. Patient has no  psychotic symptoms. No changes in treatment indicated at this time: may continue current psychotropic management.

## 2021-05-14 NOTE — PROGRESS NOTE ADULT - SUBJECTIVE AND OBJECTIVE BOX
HPI:  77 yo F with a PMH CAD s/p stent, depression, HLD, breast cancer s/p RT/mastectomy/lumpectomy (on chemotherapy), and insomnia who presents with weakness. She has been on chemotherapy every 21 days for the past few months, last one on 5/4 (9 days ago). She had tolerated chemotherapy but a couple days after the last treatment, she developed severe weakness. She has since had tremendous difficulty with walking or other minimal exertion. She denies dizziness while walking, fevers, chills, cough, chest pain, abdominal pain, nausea, vomiting, diarrhea, blood in her stool or urine, dysuria, or rash/swelling. She states her next (and so far last) treatment was scheduled for 5/25.  Her breast cancer, according to paperwork, is stage IIB.  In the ED, she was given Trazodone 300 mg PO x1 and NS 1L x1. (12 May 2021 23:46)    ONCOLOGY  77 y/o female hx of L breast cancer 2010 s/p lumpectomy, metachronous R breast cancer s/p R mastectomy [facility]D Jan 2021 pT2, N1a, ER/CA positive HER2 negative stage II B, prognostic II A on adjuvant chemotherapy with taxotere / cytoxan every 21 days - total 6 cycles planned ( Dr Lanier) Had TC cycle 5 on 5/4/21 . After last chemo- profound weakness. No fever, no cough, no dysuria, no n/v/d. Poor po intake. Extreme fatigue with minimal exertion.    5/14/21 Feels little better. She walked yesterday. C/o constipation. Eating a little.     PAST MEDICAL & SURGICAL HISTORY:  Depression    Hiatal hernia    Lumbar radiculopathy    Lumbar spondylosis    History of coronary artery disease  s/p stent    Breast cancer  left breast- lumpectomy and mastectomy, and radiation - CURRENTLY RIGHT BREAST IDC    HLD (hyperlipidemia)  H/O: hysterectomy  H/O heart artery stent    H/O lumpectomy    H/O left mastectomy    FAMILY HISTORY:  FH: heart disease  father    Social History:  Smoked a pack per day x many years, stopped ?20 years ago.  No significant alcohol or drug use.  Her son lives with her.  Not employed. (12 May 2021 23:46)    ROS : as above all other sx reviewed and negative     Vital Signs Last 24 Hrs  T(C): 36.6 (14 May 2021 04:54), Max: 37.3 (14 May 2021 00:10)  T(F): 97.8 (14 May 2021 04:54), Max: 99.2 (14 May 2021 00:10)  HR: 113 (14 May 2021 04:54) (82 - 113)  BP: 105/49 (14 May 2021 04:54) (90/37 - 118/50)  BP(mean): --  RR: 18 (14 May 2021 04:54) (17 - 18)  SpO2: 94% (14 May 2021 04:54) (92% - 98%)    Looks better. Face slightly flushed.  NAD   Sclera not icteric    Oral mucosa moist  No adenopathy  R mastectomy  with expander   Lungs clear  Heart RRR  Abd soft NT BS +  Neuro non focal  Musculoskeletal generalized weakness  Psych- appears depressed / worried                           8.3    1.05  )-----------( 316      ( 13 May 2021 06:55 )             25.4     yesterday     CTA - no PE;  no infiltrates

## 2021-05-14 NOTE — BEHAVIORAL HEALTH ASSESSMENT NOTE - NSBHCHARTREVIEWLAB_PSY_A_CORE FT
05-13    134<L>  |  104  |  16  ----------------------------<  93  3.6   |  23  |  0.77    Ca    8.0<L>      13 May 2021 06:55  Phos  2.4     05-13  Mg     2.0     05-13    TPro  5.3<L>  /  Alb  2.1<L>  /  TBili  0.3  /  DBili  x   /  AST  89<H>  /  ALT  69  /  AlkPhos  166<H>  05-13

## 2021-05-14 NOTE — BEHAVIORAL HEALTH ASSESSMENT NOTE - NSBHCHARTREVIEWINVESTIGATE_PSY_A_CORE FT
Ventricular Rate 75 BPM    Atrial Rate 75 BPM    P-R Interval 128 ms    QRS Duration 84 ms    Q-T Interval 384 ms    QTC Calculation(Bazett) 428 ms    P Axis 44 degrees    R Axis 51 degrees    T Axis 33 degrees    Diagnosis Line Normal sinus rhythm  Nonspecific T wave abnormality  Abnormal ECG  When compared with ECG of 31-DEC-2020 10:02,  Nonspecific T wave abnormality now evident in Inferior leads  Confirmed by Jaspreet Boyle MD [id number]) on 5/13/2021 2:08:22 PM

## 2021-05-15 ENCOUNTER — TRANSCRIPTION ENCOUNTER (OUTPATIENT)
Age: 76
End: 2021-05-15

## 2021-05-15 LAB
ADD ON TEST-SPECIMEN IN LAB: SIGNIFICANT CHANGE UP
ALBUMIN SERPL ELPH-MCNC: 2.1 G/DL — LOW (ref 3.3–5)
ALP SERPL-CCNC: 140 U/L — HIGH (ref 40–120)
ALT FLD-CCNC: 42 U/L — SIGNIFICANT CHANGE UP (ref 12–78)
AST SERPL-CCNC: 37 U/L — SIGNIFICANT CHANGE UP (ref 15–37)
BASOPHILS # BLD AUTO: 0 K/UL — SIGNIFICANT CHANGE UP (ref 0–0.2)
BASOPHILS NFR BLD AUTO: 0 % — SIGNIFICANT CHANGE UP (ref 0–2)
BILIRUB SERPL-MCNC: 0.4 MG/DL — SIGNIFICANT CHANGE UP (ref 0.2–1.2)
BUN SERPL-MCNC: 14 MG/DL — SIGNIFICANT CHANGE UP (ref 7–23)
CALCIUM SERPL-MCNC: 9 MG/DL — SIGNIFICANT CHANGE UP (ref 8.5–10.1)
CHLORIDE SERPL-SCNC: 108 MMOL/L — SIGNIFICANT CHANGE UP (ref 96–108)
CO2 SERPL-SCNC: 23 MMOL/L — SIGNIFICANT CHANGE UP (ref 22–31)
CREAT SERPL-MCNC: 0.76 MG/DL — SIGNIFICANT CHANGE UP (ref 0.5–1.3)
EOSINOPHIL # BLD AUTO: 0.69 K/UL — HIGH (ref 0–0.5)
EOSINOPHIL NFR BLD AUTO: 5 % — SIGNIFICANT CHANGE UP (ref 0–6)
GLUCOSE SERPL-MCNC: 74 MG/DL — SIGNIFICANT CHANGE UP (ref 70–99)
HCT VFR BLD CALC: 28.3 % — LOW (ref 34.5–45)
HGB BLD-MCNC: 9.1 G/DL — LOW (ref 11.5–15.5)
LYMPHOCYTES # BLD AUTO: 1.38 K/UL — SIGNIFICANT CHANGE UP (ref 1–3.3)
LYMPHOCYTES # BLD AUTO: 10 % — LOW (ref 13–44)
MAGNESIUM SERPL-MCNC: 2.4 MG/DL — SIGNIFICANT CHANGE UP (ref 1.6–2.6)
MCHC RBC-ENTMCNC: 29.4 PG — SIGNIFICANT CHANGE UP (ref 27–34)
MCHC RBC-ENTMCNC: 32.2 GM/DL — SIGNIFICANT CHANGE UP (ref 32–36)
MCV RBC AUTO: 91.6 FL — SIGNIFICANT CHANGE UP (ref 80–100)
MONOCYTES # BLD AUTO: 1.38 K/UL — HIGH (ref 0–0.9)
MONOCYTES NFR BLD AUTO: 10 % — SIGNIFICANT CHANGE UP (ref 2–14)
NEUTROPHILS # BLD AUTO: 10.08 K/UL — HIGH (ref 1.8–7.4)
NEUTROPHILS NFR BLD AUTO: 72 % — SIGNIFICANT CHANGE UP (ref 43–77)
NRBC # BLD: SIGNIFICANT CHANGE UP /100 WBCS (ref 0–0)
PLATELET # BLD AUTO: 429 K/UL — HIGH (ref 150–400)
POTASSIUM SERPL-MCNC: 4 MMOL/L — SIGNIFICANT CHANGE UP (ref 3.5–5.3)
POTASSIUM SERPL-SCNC: 4 MMOL/L — SIGNIFICANT CHANGE UP (ref 3.5–5.3)
PROT SERPL-MCNC: 5.4 GM/DL — LOW (ref 6–8.3)
RBC # BLD: 3.09 M/UL — LOW (ref 3.8–5.2)
RBC # FLD: 16.9 % — HIGH (ref 10.3–14.5)
SODIUM SERPL-SCNC: 139 MMOL/L — SIGNIFICANT CHANGE UP (ref 135–145)
WBC # BLD: 13.81 K/UL — HIGH (ref 3.8–10.5)
WBC # FLD AUTO: 13.81 K/UL — HIGH (ref 3.8–10.5)

## 2021-05-15 PROCEDURE — 99231 SBSQ HOSP IP/OBS SF/LOW 25: CPT

## 2021-05-15 PROCEDURE — 99233 SBSQ HOSP IP/OBS HIGH 50: CPT

## 2021-05-15 PROCEDURE — 71045 X-RAY EXAM CHEST 1 VIEW: CPT | Mod: 26

## 2021-05-15 PROCEDURE — 93306 TTE W/DOPPLER COMPLETE: CPT | Mod: 26

## 2021-05-15 RX ORDER — ALBUTEROL 90 UG/1
2 AEROSOL, METERED ORAL EVERY 6 HOURS
Refills: 0 | Status: DISCONTINUED | OUTPATIENT
Start: 2021-05-15 | End: 2021-05-16

## 2021-05-15 RX ORDER — SODIUM CHLORIDE 9 MG/ML
500 INJECTION INTRAMUSCULAR; INTRAVENOUS; SUBCUTANEOUS ONCE
Refills: 0 | Status: COMPLETED | OUTPATIENT
Start: 2021-05-15 | End: 2021-05-15

## 2021-05-15 RX ORDER — FUROSEMIDE 40 MG
20 TABLET ORAL ONCE
Refills: 0 | Status: COMPLETED | OUTPATIENT
Start: 2021-05-15 | End: 2021-05-15

## 2021-05-15 RX ORDER — SENNA PLUS 8.6 MG/1
2 TABLET ORAL
Qty: 60 | Refills: 0
Start: 2021-05-15 | End: 2021-06-13

## 2021-05-15 RX ORDER — ASCORBIC ACID 60 MG
500 TABLET,CHEWABLE ORAL DAILY
Refills: 0 | Status: DISCONTINUED | OUTPATIENT
Start: 2021-05-15 | End: 2021-05-16

## 2021-05-15 RX ORDER — FERROUS SULFATE 325(65) MG
325 TABLET ORAL DAILY
Refills: 0 | Status: DISCONTINUED | OUTPATIENT
Start: 2021-05-15 | End: 2021-05-16

## 2021-05-15 RX ADMIN — ATORVASTATIN CALCIUM 80 MILLIGRAM(S): 80 TABLET, FILM COATED ORAL at 21:34

## 2021-05-15 RX ADMIN — Medication 300 MILLIGRAM(S): at 21:34

## 2021-05-15 RX ADMIN — PANTOPRAZOLE SODIUM 40 MILLIGRAM(S): 20 TABLET, DELAYED RELEASE ORAL at 09:24

## 2021-05-15 RX ADMIN — SODIUM CHLORIDE 500 MILLILITER(S): 9 INJECTION INTRAMUSCULAR; INTRAVENOUS; SUBCUTANEOUS at 23:55

## 2021-05-15 RX ADMIN — ENOXAPARIN SODIUM 40 MILLIGRAM(S): 100 INJECTION SUBCUTANEOUS at 09:24

## 2021-05-15 RX ADMIN — Medication 81 MILLIGRAM(S): at 09:24

## 2021-05-15 RX ADMIN — DESVENLAFAXINE 100 MILLIGRAM(S): 50 TABLET, EXTENDED RELEASE ORAL at 09:25

## 2021-05-15 RX ADMIN — Medication 20 MILLIGRAM(S): at 16:49

## 2021-05-15 RX ADMIN — MONTELUKAST 10 MILLIGRAM(S): 4 TABLET, CHEWABLE ORAL at 09:24

## 2021-05-15 RX ADMIN — Medication 2000 UNIT(S): at 09:24

## 2021-05-15 RX ADMIN — Medication 325 MILLIGRAM(S): at 15:52

## 2021-05-15 RX ADMIN — Medication 500 MILLIGRAM(S): at 15:52

## 2021-05-15 NOTE — DISCHARGE NOTE PROVIDER - HOSPITAL COURSE
Subjective:  Patient is a 76y old  Female who presents with a chief complaint of acute leukopenia, weakness      HPI:  75 yo F with a PMH CAD s/p stent, depression, HLD, breast cancer s/p RT/mastectomy/lumpectomy (on chemotherapy), and insomnia who presents on 5/12/21 with weakness. She has been on chemotherapy every 21 days for the past few months, last one on 5/4 (9 days ago). She had tolerated chemotherapy but a couple days after the last treatment, she developed severe weakness. She has since had tremendous difficulty with walking or other minimal exertion. She denies dizziness while walking, fevers, chills, cough, chest pain, abdominal pain, nausea, vomiting, diarrhea, blood in her stool or urine, dysuria, or rash/swelling. She states her next (and so far last) treatment was scheduled for 5/25.  Her breast cancer, according to paperwork, is stage IIB.  In the ED, she was given Trazodone 300 mg PO x1 and NS 1L x1.      5/13 - patient seen and examined at bedside earlier today, + gen weakness,  dyspnea on exertion, + constipation for many days, POC discussed    5/14 - pt seen and examined, denies cp, dyspnea resolved, afebrile, weakness improving, + constipation, POC discussed   5/15 - pt seen and examined, multiple BM after week of constipation, afebrile tolerating po intake, denies cp, reports CHO when walks , POC discussed     Review of system- Rest of the review of system are negative except mentioned in HPI    T(C): 36.7 (05-14-21 @ 21:43), Max: 37.2 (05-14-21 @ 15:55)  T(F): 98.1 (05-14-21 @ 21:43), Max: 99 (05-14-21 @ 15:55)  HR: 90 (05-14-21 @ 21:43) (90 - 90)  BP: 110/46 (05-14-21 @ 21:43) (90/49 - 110/46)  RR: 18 (05-14-21 @ 21:43) (18 - 18)  SpO2: 95% (05-14-21 @ 21:43) (94% - 95%)  Wt(kg): --    Physical exam:   GENERAL: NAD  NERVOUS SYSTEM:  Alert & Oriented X3, non- focal exam, Motor Strength 5/5 B/L upper and lower extremities; DTRs 2+ intact and symmetric  HEAD:  Atraumatic, Normocephalic  EYES: EOMI, PERRLA, conjunctiva and sclera clear  HEENT: Moist mucous membranes  NECK: Supple, No JVD  CHEST/LUNG: Clear to auscultation bilaterally; No rales, no rhonchi, no wheezing  HEART: Regular rate and rhythm; No murmurs, no rubs or gallops  ABDOMEN: Soft, Non-tender, Non-distended; Bowel sounds present  GENITOURINARY- Voiding, no suprapubic tenderness  EXTREMITIES:  2+ Peripheral Pulses, No clubbing, cyanosis,   edema  MUSCULOSKELETAL:- No muscle tenderness, Muscle tone normal, No joint tenderness, no Joint swelling,  Joint ROM –normal   SKIN-no rash, no lesion, RUE swelling persist     · Assessment    75 yo F with a PMH CAD s/p stent, depression, HLD, breast cancer s/p RT/mastectomy/lumpectomy (on chemotherapy), and insomnia who presents with weakness.    Generalized weakness , improving   - Likely due to chemotherapy, last dose 5/14   - Low suspicion of acute infection, no specific pain, hold off on antibiotics for now  - s/p IVFs will stop , encourage PO intake, c/w vit D ,  PT eval  - daily PT  Severe  Neutropenia, resolved   - With worsening anemia/leukopenia,  , start Zarxio , will stop now   - monitor for infection, contact precautions  Acute on chronic anemia, normocytic , ACD   - Check iron studies, ferritin, B12, folate, reticulocyte count - noted   - + constipation  - Hematology/Oncology consult  - c/w  aspirin 81 mg QD  Dyspnea on exertion ruled out PE , likely due to anemia, deconditioning   Abnormal EKG , elevated BNP, stable CAD   Stage II breast cancer s/p right mastectomy  - On chemotherapy last dose 5/4, next planned for 5/25  - Supportive therapy, including PPI, anti-emetics  - Hematology/Oncology consult, will check O2 on ambulation   Hyponatremia hypovolemic , resolved - d/c  IV fluids  Acute urinary retention likely due to constipation , resolved   - Was not urinating on PureWick  - Bladder scan showed 750 ml; now s/p straight cath  - Recheck TOV,  Renal function intact  - laxatives magnesium citrate   Transaminitis improving mild due to adverse drug effect    - could be due to taxotere.   - repeat in am with lipase   Depression, worsening  - C/w Desvenlafaxine 100 mg QD  - C/w Trazodone 300 mg QHS  - psychiatry consult  RUE swelling , likely dependent edema - albumin 2.1   - doppler - no DVT   - stop IV fluids   HLD - C/w Atorvastatin equivalent dose of Rosuvastatin 40 mg QD   Advanced directives   Full code  skip [place] [phone] updated  5/13/21, 5/14, 5/15     Disposition - medically optimized to be discharged home with close follow up with PCP, oncologist within 1 week  return to ED if fever, abdominal pain, nausea, vomiting, chest pain, dyspnea  Discharge plan discussed with patient, RN  Patient advised to follow up with PCP within 3-7 days  time spend 40 min  Discharge note faxed to PCP with my contact information to call me back   PCP Dr. Colunga     Subjective:  Patient is a 76y old  Female who presents with a chief complaint of acute leukopenia, weakness      HPI:  75 yo F with a PMH CAD s/p stent, depression, HLD, breast cancer s/p RT/mastectomy/lumpectomy (on chemotherapy), and insomnia who presents on 5/12/21 with weakness. She has been on chemotherapy every 21 days for the past few months, last one on 5/4 (9 days ago). She had tolerated chemotherapy but a couple days after the last treatment, she developed severe weakness. She has since had tremendous difficulty with walking or other minimal exertion. She denies dizziness while walking, fevers, chills, cough, chest pain, abdominal pain, nausea, vomiting, diarrhea, blood in her stool or urine, dysuria, or rash/swelling. She states her next (and so far last) treatment was scheduled for 5/25.  Her breast cancer, according to paperwork, is stage IIB.  In the ED, she was given Trazodone 300 mg PO x1 and NS 1L x1.      5/13 - patient seen and examined at bedside earlier today, + gen weakness,  dyspnea on exertion, + constipation for many days, POC discussed    5/14 - pt seen and examined, denies cp, dyspnea resolved, afebrile, weakness improving, + constipation, POC discussed   5/15 - pt seen and examined, multiple BM after week of constipation, afebrile tolerating po intake, denies cp, reports CHO when walks , POC discussed   5/16 - pt seen and examined , denies cp, dyspnea on exertion persist but improved, afebrile, POC discussed     Review of system- Rest of the review of system are negative except mentioned in HPI  T(C): 36.8 (05-16-21 @ 09:38), Max: 37.1 (05-15-21 @ 16:34)  T(F): 98.3 (05-16-21 @ 09:38), Max: 98.7 (05-15-21 @ 16:34)  HR: 72 (05-16-21 @ 09:38) (72 - 86)  BP: 132/58 (05-16-21 @ 09:38) (83/35 - 132/58)  RR: 17 (05-16-21 @ 09:38) (17 - 18)  SpO2: 97% (05-16-21 @ 09:38) (95% - 100%)  Wt(kg): --    Physical exam:   GENERAL: NAD  NERVOUS SYSTEM:  Alert & Oriented X3, non- focal exam, Motor Strength 5/5 B/L upper and lower extremities; DTRs 2+ intact and symmetric  HEAD:  Atraumatic, Normocephalic  EYES: EOMI, PERRLA, conjunctiva and sclera clear  HEENT: Moist mucous membranes  NECK: Supple, No JVD  CHEST/LUNG: Clear to auscultation bilaterally; No rales, no rhonchi, no wheezing  HEART: Regular rate and rhythm; No murmurs, no rubs or gallops  ABDOMEN: Soft, Non-tender, Non-distended; Bowel sounds present  GENITOURINARY- Voiding, no suprapubic tenderness  EXTREMITIES:  2+ Peripheral Pulses, No clubbing, cyanosis,   edema  MUSCULOSKELETAL:- No muscle tenderness, Muscle tone normal, No joint tenderness, no Joint swelling,  Joint ROM –normal   SKIN-no rash, no lesion, RUE swelling persist     · Assessment    75 yo F with a PMH CAD s/p stent, depression, HLD, breast cancer s/p RT/mastectomy/lumpectomy (on chemotherapy), and insomnia who presents with weakness.    Generalized weakness , improving   - Likely due to chemotherapy, last dose 5/14   - Low suspicion of acute infection, no specific pain, hold off on antibiotics for now  - s/p IVFs will stop , encourage PO intake, c/w vit D ,  PT eval  - daily PT  Severe  Neutropenia, resolved   - With worsening anemia/leukopenia,  , start Zarxio , will stop now   - monitor for infection, contact precautions  Acute on chronic anemia, normocytic , ACD   - Check iron studies, ferritin, B12, folate, reticulocyte count - noted   - + constipation,  Hematology/Oncology consult  - c/w  aspirin 81 mg QD  Dyspnea on exertion ruled out PE , likely due to anemia, deconditioning   Abnormal EKG , elevated BNP, stable CAD   Stage II breast cancer s/p right mastectomy  - On chemotherapy last dose 5/4, next planned for 5/25  - Supportive therapy, including PPI, anti-emetics  - Hematology/Oncology consult, will check O2 on ambulation  97% today   Hyponatremia hypovolemic , resolved - d/c  IV fluids  Acute urinary retention likely due to constipation , resolved   - Was not urinating on PureWick  - Bladder scan showed 750 ml; now s/p straight cath  - Recheck TOV,  Renal function intact  - laxatives magnesium citrate   Transaminitis improving mild due to adverse drug effect    - could be due to taxotere.   - repeat in am with lipase   Depression, worsening  - C/w Desvenlafaxine 100 mg QD  - C/w Trazodone 300 mg QHS  - psychiatry consult  RUE swelling , likely dependent edema - albumin 2.1   - doppler - no DVT   - stop IV fluids   HLD - C/w Atorvastatin equivalent dose of Rosuvastatin 40 mg QD   Acute pulmonary edema due to acute diastolic HF after IV fluids  - s/p Lasix 20 5/15 , improved,  2 d echo - Ef 55 %   - stable to go home  Advanced directives   Full code  skip [place] [phone] updated  5/13/21, 5/14, 5/15 , 5/16     Disposition - medically optimized to be discharged home with close follow up with PCP, oncologist within 1 week  return to ED if fever, abdominal pain, nausea, vomiting, chest pain, dyspnea  Discharge plan discussed with patient, RN  Patient advised to follow up with PCP within 3-7 days  time spend 40 min  Discharge note faxed to PCP with my contact information to call me back   PCP Dr. Colunga

## 2021-05-15 NOTE — DISCHARGE NOTE PROVIDER - CARE PROVIDER_API CALL
Darline Vidales)  Cardiology; Interventional Cardiology  [address] [facility], [place] [address]  Phone: [phone]  Fax: [phone]  Follow Up Time: 1 week    Krista Pastor  HEMATOLOGY  [address] [facility], [place] [address]  Phone: [phone]  Fax: [phone]  Follow Up Time: 1 week   Darline Vidales)  Cardiology; Interventional Cardiology  [address] [facility], [place] [address]  Phone: [phone]  Fax: [phone]  Follow Up Time: 1 week    Krista Pastor  HEMATOLOGY  [address] [facility], [place] [address]  Phone: [phone]  Fax: [phone]  Follow Up Time: 1 week    ZOILA Cardona ()  Pulmonary Disease; Sleep Medicine  [address] [place] [address]  Phone: [phone]  Fax: [phone]  Follow Up Time: 1 week    Tracy Orozco)  Internal Medicine  [address] [facility], [address] [place] [address]  Phone: [phone]  Fax: [phone]  Follow Up Time: 1 week

## 2021-05-15 NOTE — DISCHARGE NOTE PROVIDER - NSDCMRMEDTOKEN_GEN_ALL_CORE_FT
aspirin 81 mg oral tablet: 1 tab(s) orally once a day  cholecalciferol oral tablet: 2000 unit(s) orally once a day  desvenlafaxine (as base) 100 mg oral tablet, extended release: 1 tab(s) orally once a day  magnesium citrate: 210 milligram(s) orally  3 tabs once a day (at bedtime)  omeprazole 40 mg oral delayed release capsule: 1 cap(s) orally once a day  rosuvastatin 40 mg oral tablet: 1 tab(s) orally once a day  senna 8.6 mg oral tablet: 2 tab(s) orally once a day (at bedtime), As Needed  -for constipation   Singulair 10 mg oral tablet: 1 tab(s) orally the night before every chemo infusion  traZODone 100 mg oral tablet: 3 tab(s) orally once a day (at bedtime)   ascorbic acid 500 mg oral tablet: 1 tab(s) orally once a day  aspirin 81 mg oral tablet: 1 tab(s) orally once a day  cholecalciferol oral tablet: 2000 unit(s) orally once a day  desvenlafaxine (as base) 100 mg oral tablet, extended release: 1 tab(s) orally once a day  ferrous sulfate 325 mg (65 mg elemental iron) oral tablet: 1 tab(s) orally once a day  magnesium citrate: 210 milligram(s) orally  3 tabs once a day (at bedtime)  omeprazole 40 mg oral delayed release capsule: 1 cap(s) orally once a day  ProAir HFA 90 mcg/inh inhalation aerosol: 2 puff(s) inhaled every 4 hours, As Needed -for bronchospasm   rosuvastatin 40 mg oral tablet: 1 tab(s) orally once a day  senna 8.6 mg oral tablet: 2 tab(s) orally once a day (at bedtime), As Needed  -for constipation   Singulair 10 mg oral tablet: 1 tab(s) orally the night before every chemo infusion  tiotropium 18 mcg inhalation capsule: 1 cap(s) inhaled once a day  traZODone 100 mg oral tablet: 3 tab(s) orally once a day (at bedtime)

## 2021-05-15 NOTE — DISCHARGE NOTE PROVIDER - CARE PROVIDERS DIRECT ADDRESSES
,DirectAddress_Unknown,[email or web address] ,DirectAddress_Unknown,[email or web address],DirectAddress_Unknown,[email or web address]

## 2021-05-15 NOTE — PROGRESS NOTE ADULT - ASSESSMENT
77 y/o  female with Hx of CAD s/p sten, HLD, depression, stage II R breast cancer ERE/DE positive HER 2 neg  2021 s/p R mastectomy with  expander  ALND, on adjuvant chemotherapy with taxotere cytoxan ( per Dr Lanier) cycle # 5 on 5/4/21 - now with profound weakness with minimal exertion, poor po intake.  Neutropenia due to chemotherapy ( expected rebeca). s/p Zarxio.  Neutropenia resolved. Zarxio d/c.  Anemia due to chemotherapy stable.  Fatigue- due to chemo- cumulative  Improving.   Slight elevation in LFT's - could be due to taxotere.  No PE .  No infiltrates on CT chest. No fever no cough. No evidence of infection.    Encourage nutrition.   Probably can be discharged home. Her son is back home and he will help her.     Follow up in our office in 1-2 weeks.

## 2021-05-15 NOTE — PROVIDER CONTACT NOTE (OTHER) - REASON
hemonc consult
pt hypotensive
patient complaining of right arm swellling
Cardiology consult
Pulmonary consult
consult
pt hypotensive

## 2021-05-15 NOTE — DISCHARGE NOTE PROVIDER - NSDCCPCAREPLAN_GEN_ALL_CORE_FT
PRINCIPAL DISCHARGE DIAGNOSIS  Diagnosis: Chemotherapy-induced neutropenia  Assessment and Plan of Treatment: s/p Zarxio 2 doses with WBC 13, repeat CBC in 1 week , follow up with DR. Colunga for further management of the cancer      SECONDARY DISCHARGE DIAGNOSES  Diagnosis: Weakness  Assessment and Plan of Treatment: daily PT , take supplements - Ensure , protein drinks    Diagnosis: CHO (dyspnea on exertion)  Assessment and Plan of Treatment: follow up with DR. Vidales within 1 week for further monitoring    Diagnosis: Breast cancer  Assessment and Plan of Treatment: follow up with Dr. Colunga for further management within 1 week    Diagnosis: Anemia in neoplastic disease  Assessment and Plan of Treatment: repeat CBC in 1 week , follow up with PCP for further monitoring and work-up as needed    Diagnosis: Elevated LFTs  Assessment and Plan of Treatment: continue rovustatin, repeat Liver function tests in 1 week , follow up with PCP /oncology for further work-up     PRINCIPAL DISCHARGE DIAGNOSIS  Diagnosis: Chemotherapy-induced neutropenia  Assessment and Plan of Treatment: s/p Zarxio 2 doses with WBC 13, repeat CBC in 1 week , follow up with DR. Colunga for further management of the cancer      SECONDARY DISCHARGE DIAGNOSES  Diagnosis: Weakness  Assessment and Plan of Treatment: daily PT , take supplements - Ensure , protein drinks    Diagnosis: CHO (dyspnea on exertion)  Assessment and Plan of Treatment: follow up with DR. Vidlaes  and Dr. Ray within 1 week for further monitoring, take inhalers as prescribed    Diagnosis: Breast cancer  Assessment and Plan of Treatment: follow up with Dr. Colunga for further management within 1 week    Diagnosis: Anemia in neoplastic disease  Assessment and Plan of Treatment: repeat CBC in 1 week , follow up with PCP for further monitoring and work-up as needed    Diagnosis: Elevated LFTs  Assessment and Plan of Treatment: continue rovustatin, repeat Liver function tests in 1 week , follow up with PCP /oncology for further work-up    Diagnosis: Constipation  Assessment and Plan of Treatment: take laxatives daily

## 2021-05-15 NOTE — PROGRESS NOTE ADULT - SUBJECTIVE AND OBJECTIVE BOX
HPI:  75 yo F with a PMH CAD s/p stent, depression, HLD, breast cancer s/p RT/mastectomy/lumpectomy (on chemotherapy), and insomnia who presents with weakness. She has been on chemotherapy every 21 days for the past few months, last one on 5/4 (9 days ago). She had tolerated chemotherapy but a couple days after the last treatment, she developed severe weakness. She has since had tremendous difficulty with walking or other minimal exertion. She denies dizziness while walking, fevers, chills, cough, chest pain, abdominal pain, nausea, vomiting, diarrhea, blood in her stool or urine, dysuria, or rash/swelling. She states her next (and so far last) treatment was scheduled for 5/25.  Her breast cancer, according to paperwork, is stage IIB.  In the ED, she was given Trazodone 300 mg PO x1 and NS 1L x1. (12 May 2021 23:46)    ONCOLOGY  75 y/o female hx of L breast cancer 2010 s/p lumpectomy, metachronous R breast cancer s/p R mastectomy ALNFD Jan 2021 pT2, N1a, ER/VA positive HER2 negative stage II B, prognostic II A on adjuvant chemotherapy with taxotere / cytoxan every 21 days - total 6 cycles planned ( Dr Lnaier) Had TC cycle 5 on 5/4/21 . After last chemo- profound weakness. No fever, no cough, no dysuria, no n/v/d. Poor po intake. Extreme fatigue with minimal exertion.    5/14/21 Feels little better. She walked yesterday. C/o constipation. Eating a little.   5/15/21  Feels better. Walked yest .BM +  Urinating. Eating better. Still weak but improved.   PAST MEDICAL & SURGICAL HISTORY:  Depression    Hiatal hernia    Lumbar radiculopathy    Lumbar spondylosis    History of coronary artery disease  s/p stent    Breast cancer  left breast- lumpectomy and mastectomy, and radiation - CURRENTLY RIGHT BREAST IDC    HLD (hyperlipidemia)  H/O: hysterectomy  H/O heart artery stent    H/O lumpectomy    H/O left mastectomy    FAMILY HISTORY:  FH: heart disease  father    Social History:  Smoked a pack per day x many years, stopped ?20 years ago.  No significant alcohol or drug use.  Her son lives with her.  Not employed. (12 May 2021 23:46)    ROS : as above all other sx reviewed and negative     Vital Signs Last 24 Hrs  T(C): 36.7 (14 May 2021 21:43), Max: 37.2 (14 May 2021 15:55)  T(F): 98.1 (14 May 2021 21:43), Max: 99 (14 May 2021 15:55)  HR: 90 (14 May 2021 21:43) (90 - 90)  BP: 110/46 (14 May 2021 21:43) (90/49 - 110/46)  BP(mean): --  RR: 18 (14 May 2021 21:43) (18 - 18)  SpO2: 95% (14 May 2021 21:43) (94% - 95%)    Looks better. Face slightly flushed.  NAD   Sclera not icteric    Oral mucosa moist  No adenopathy  R mastectomy  with expander   Lungs clear  Heart RRR  Abd soft NT BS +  Neuro non focal  Musculoskeletal generalized weakness  Psych- appears depressed / worried                           8.3    1.05  )-----------( 316      ( 13 May 2021 06:55 )             25.4                              9.1    13.81 )-----------( 429      ( 15 May 2021 07:30 )             28.3        CTA - no PE;  no infiltrates

## 2021-05-15 NOTE — PROGRESS NOTE ADULT - ASSESSMENT
75 yo F with a PMH CAD s/p stent, depression, HLD, breast cancer s/p RT/mastectomy/lumpectomy (on chemotherapy), and insomnia who presents with weakness.    Generalized weakness , improving   - Likely due to chemotherapy, last dose 5/14   - Low suspicion of acute infection, no specific pain, hold off on antibiotics for now  - s/p IVFs will stop , encourage PO intake, c/w vit D   - PT eval  - home PT   Severe  Neutropenia, improving   - With worsening anemia/leukopenia,  , start Zarxio   - monitor for infection, contact precautions  Acute on chronic anemia, normocytic , ACD   - Check iron studies, ferritin, B12, folate, reticulocyte count - noted   - Check fecal occult  - pending, + constipation  - Hematology/Oncology consult  - c/w  aspirin 81 mg QD for now   Dyspnea on exertion ruled out PE , likely due to anemia, deconditioning , with underlying mild COPD, Atelectasis, Biapical scarring  Abnormal EKG , elevated BNP, stable CAD , Mild to moderate MR   - CTA 5/12 - Mild COPD, Atelectasis, biapical scarring   - cardiology consult - d/w Dr. Vidales, will repeat echo   - troponin x 1 neg, repeat neg   - 2 d echo, repeat CXR to r/o fluid overload, may need lasix will await cardiology evaluation  - albuterol prn, incentive spirometry  Pt seen today for diagnosis of COPD , while in a chronic and stable state, my patient is still hypoxemic to 82% on ambulation on RA, with 2 L of O2 supplementation pt O2 sats wnl. Patient will require 24 hour oxygen at home.   Stage II breast cancer s/p right mastectomy  - On chemotherapy last dose 5/4, next planned for 5/25  - Supportive therapy, including PPI, anti-emetics  - Hematology/Oncology consult  Hyponatremia hypovolemic , resolved - d/c  IV fluids  Acute urinary retention likely due to constipation   - Was not urinating on PureWick  - Bladder scan showed 750 ml; now s/p straight cath  - Recheck TOV,  Renal function intact  - laxatives magnesium citrate , send FOBT    Transaminitis improving mild due to adverse drug effect    - could be due to taxotere.   - repeat in am with lipase  wnl   - c/w statins for now  Depression, worsening  - C/w Desvenlafaxine 100 mg QD  - C/w Trazodone 300 mg QHS  - psychiatry consult  RUE swelling , likely dependent edema - albumin 2.1   - doppler - no DVT   - stop IV fluids   HLD - C/w Atorvastatin equivalent dose of Rosuvastatin 40 mg QD     DVT PPX: IMPROVE score of 3, will give Lovenox 40 mg SQ QD    Advanced directives   Full code  skip [place] [phone] updated  5/13/21, 5/14, 5/15     Dispo - home with home PT, will need O2 , pulmonary consult, anticipated discharge in 1-2 days

## 2021-05-15 NOTE — PROGRESS NOTE ADULT - REASON FOR ADMISSION
acute leukopenia, weakness
Yes

## 2021-05-15 NOTE — DISCHARGE NOTE PROVIDER - PROVIDER TOKENS
PROVIDER:[TOKEN:[[id number]],FOLLOWUP:[1 week]],PROVIDER:[TOKEN:[[id number]],FOLLOWUP:[1 week]] PROVIDER:[TOKEN:[[id number]],FOLLOWUP:[1 week]],PROVIDER:[TOKEN:[[id number]],FOLLOWUP:[1 week]],PROVIDER:[TOKEN:[[id number]],FOLLOWUP:[1 week]],PROVIDER:[TOKEN:[[id number]],FOLLOWUP:[1 week]]

## 2021-05-15 NOTE — PROVIDER CONTACT NOTE (OTHER) - SITUATION
spoke with Dr. Vidales regarding consult
pt hypotensive s/p 500cc bolus , w/ low BP at baseline, WBC 1.05.
spoke w/ Kathie from answering service regarding am consult
spoke with Nidhi in office regarding consult
spoke with ans service Maliha regarding consult

## 2021-05-15 NOTE — PROVIDER CONTACT NOTE (OTHER) - DATE AND TIME:
13-May-2021 01:36
15-May-2021 14:26
13-May-2021 13:16
13-May-2021 21:10
13-May-2021 22:57
13-May-2021 13:11
13-May-2021 14:45

## 2021-05-15 NOTE — DISCHARGE NOTE PROVIDER - NSDCFUSCHEDAPPT_GEN_ALL_CORE_FT
ALVAREZ LÓPEZ  ; 05/20/2021 ; [facility] [address] AL[facility]ARE[facility]  ; 05/25/2021 ; [facility]  ALVAREZ LÓPEZ  ; 06/01/2021 ; [facility]  ALVAREZ LÓPEZ  ; 06/15/2021 ; [facility]

## 2021-05-15 NOTE — PROGRESS NOTE ADULT - SUBJECTIVE AND OBJECTIVE BOX
Subjective:  Patient is a 76y old  Female who presents with a chief complaint of acute leukopenia, weakness      HPI:  75 yo F with a PMH CAD s/p stent, depression, HLD, breast cancer s/p RT/mastectomy/lumpectomy (on chemotherapy), and insomnia who presents on 21 with weakness. She has been on chemotherapy every 21 days for the past few months, last one on  (9 days ago). She had tolerated chemotherapy but a couple days after the last treatment, she developed severe weakness. She has since had tremendous difficulty with walking or other minimal exertion. She denies dizziness while walking, fevers, chills, cough, chest pain, abdominal pain, nausea, vomiting, diarrhea, blood in her stool or urine, dysuria, or rash/swelling. She states her next (and so far last) treatment was scheduled for .  Her breast cancer, according to paperwork, is stage IIB.  In the ED, she was given Trazodone 300 mg PO x1 and NS 1L x1.       - patient seen and examined at bedside earlier today, + gen weakness,  dyspnea on exertion, + constipation for many days, POC discussed     - pt seen and examined, denies cp, dyspnea resolved, afebrile, weakness improving, + constipation, POC discussed   5/15 - pt seen and examined, reports dyspnea on exertion, denies cp, abdominal pain, afebrile, POC discussed     Review of system- Rest of the review of system are negative except mentioned in HPI    Objective:   T(C): 36.7 (21 @ 21:43), Max: 37.2 (21 @ 15:55)  T(F): 98.1 (21 @ 21:43), Max: 99 (21 @ 15:55)  HR: 90 (21 @ 21:43) (90 - 90)  BP: 110/46 (21 @ 21:43) (90/49 - 110/46)  RR: 18 (21 @ 21:43) (18 - 18)  SpO2: 95% (21 @ 21:43) (94% - 95%)  Wt(kg): --    Physical exam:   GENERAL: NAD  NERVOUS SYSTEM:  Alert & Oriented X3, non- focal exam, Motor Strength 5/5 B/L upper and lower extremities; DTRs 2+ intact and symmetric  HEAD:  Atraumatic, Normocephalic  EYES: EOMI, PERRLA, conjunctiva and sclera clear  HEENT: Moist mucous membranes  NECK: Supple, No JVD  CHEST/LUNG: BS decreased at bases,  No rales, no rhonchi, no wheezing  HEART: Regular rate and rhythm; No murmurs, no rubs or gallops  ABDOMEN: Soft, Non-tender, Non-distended; Bowel sounds present  GENITOURINARY- Voiding, no suprapubic tenderness  EXTREMITIES:  2+ Peripheral Pulses, No clubbing, cyanosis,   edema  MUSCULOSKELETAL:- No muscle tenderness, Muscle tone normal, No joint tenderness, no Joint swelling,  Joint ROM –normal   SKIN-no rash, no lesion, RUE swelling persist     LABS: all reviewed  05-15    139  |  108  |  14  ----------------------------<  74  4.0   |  23  |  0.76    Ca    9.0      15 May 2021 07:30  Phos  2.5     -  Mg     2.4     05-15    TPro  5.4<L>  /  Alb  2.1<L>  /  TBili  0.4  /  DBili  x   /  AST  37  /  ALT  42  /  AlkPhos  140<H>  05-15                        9.1    13.81 )-----------( 429      ( 15 May 2021 07:30 )             28.3     CARDIAC MARKERS ( 14 May 2021 10:41 )  0.021 ng/mL / x     / x     / x     / x          LIVER FUNCTIONS - ( 15 May 2021 07:30 )  Alb: 2.1 g/dL / Pro: 5.4 gm/dL / ALK PHOS: 140 U/L / ALT: 42 U/L / AST: 37 U/L / GGT: x                    Urinalysis Basic - ( 13 May 2021 00:56 )    Color: Yellow / Appearance: Slightly Turbid / S.010 / pH: x  Gluc: x / Ketone: Negative  / Bili: Negative / Urobili: Negative mg/dL   Blood: x / Protein: 15 mg/dL / Nitrite: Negative   Leuk Esterase: Negative / RBC: 3-5 /HPF / WBC 0-2   Sq Epi: x / Non Sq Epi: Occasional / Bacteria: Occasional    RECENT CULTURES:  Culture - Urine (21 @ 00:56)    Specimen Source: .Urine None    Culture Results:   No growth      RADIOLOGY & ADDITIONAL TESTS: all reviewed EKG reviewed  < from: 12 Lead ECG (21 @ 17:46) >  Ventricular Rate 75 BPM    Atrial Rate 75 BPM    P-R Interval 128 ms    QRS Duration 84 ms    Q-T Interval 384 ms    QTC Calculation(Bazett) 428 ms    P Axis 44 degrees    R Axis 51 degrees    T Axis 33 degrees    Diagnosis Line Normal sinus rhythm  Nonspecific T wave abnormality  Abnormal ECG  When compared with ECG of 31-DEC-2020 10:02,  Nonspecific T wave abnormality now evident in Inferior leads    < end of copied text >  < from: US Duplex Venous Upper Ext Ltd, Right (21 @ 17:51) >  FINDINGS:    The right internal jugular, subclavian, axillary and brachial veins are patent and compressible where applicable.  The basilic and cephalic veins (superficial veins) are patent and without thrombus.    Doppler examination shows normal spontaneous and phasic flow.    IMPRESSION:  No evidence of right upper extremity deep venous thrombosis.      < end of copied text >    < from: CT Angio Chest PE Protocol w/ IV Cont (21 @ 19:10) >  FINDINGS:    LUNGS AND AIRWAYS: Patent central airways.  Mild emphysematous changes. Biapical scarring, greater on the right. Atelectatic changes.  PLEURA: Small right pleural effusion.  MEDIASTINUM AND TASHIA: No lymphadenopathy.  VESSELS: Within normal limits. No pulmonary emboli visualized.  HEART: Heart size is normal. No pericardial effusion.  CHEST WALL AND LOWER NECK: Status post right mastectomy and axillary node dissection. Prominent soft tissue adjacent to the right axillary clips. This may represent postsurgical changes although no active neoplasm cannot be excluded. Please correlate clinically. Right breast prosthesis with tissue expander. Overlying skin thickening and subcutaneous edema. Small fluid collection along the lateral inferior aspect.  VISUALIZED UPPER ABDOMEN: 2.8 cm cyst in the upper pole of the right kidney.  BONES: Minimal degenerative changes.    IMPRESSION:  No pulmonary emboli. The graft small right pleural effusion. Mild COPD.Biapical scarring.    Prominent soft tissue adjacent to the right axillary clips. This may represent postsurgical changes although tumor cannot be excluded. Small fluid adjacent to the outer inferior aspect of the right breast prosthesis.      < end of copied text >  < from: Xray Chest 1 View AP/PA. (21 @ 18:09) >  FINDINGS:    The lungs show mild haziness RIGHT lung base which may indicate small RIGHT effusion. Remaining visualized lung parenchyma clear.. No pneumothorax.    The heart and mediastinum are withinnormal limits. Atherosclerotic calcified intimal wall plaques within nondilated thoracic aorta    Visualized osseous structures are intact.      IMPRESSION:   Suspect a small RIGHT effusion and/or basilar airspace disease...  Confirmatory lateral radiograph recommended.    < end of copied text >    Current medications:  aspirin enteric coated 81 milliGRAM(s) Oral daily  atorvastatin 80 milliGRAM(s) Oral at bedtime  desvenlafaxine  milliGRAM(s) Oral daily  enoxaparin Injectable 40 milliGRAM(s) SubCutaneous daily  filgrastim-sndz (ZARXIO) Injectable 300 MICROGram(s) SubCutaneous daily  magnesium citrate Oral Solution 1 Bottle Oral once  ondansetron Injectable 4 milliGRAM(s) IV Push every 6 hours PRN  pantoprazole    Tablet 40 milliGRAM(s) Oral before breakfast  potassium phosphate / sodium phosphate Powder (PHOS-NaK) 1 Packet(s) Oral three times a day before meals  sodium chloride 0.9%. 1000 milliLiter(s) IV Continuous <Continuous>  traZODone 300 milliGRAM(s) Oral at bedtime

## 2021-05-16 VITALS
OXYGEN SATURATION: 97 % | DIASTOLIC BLOOD PRESSURE: 58 MMHG | SYSTOLIC BLOOD PRESSURE: 132 MMHG | HEART RATE: 72 BPM | RESPIRATION RATE: 17 BRPM | TEMPERATURE: 98 F

## 2021-05-16 LAB
ALBUMIN SERPL ELPH-MCNC: 2.3 G/DL — LOW (ref 3.3–5)
ALP SERPL-CCNC: 144 U/L — HIGH (ref 40–120)
ALT FLD-CCNC: 36 U/L — SIGNIFICANT CHANGE UP (ref 12–78)
ANION GAP SERPL CALC-SCNC: 8 MMOL/L — SIGNIFICANT CHANGE UP (ref 5–17)
AST SERPL-CCNC: 42 U/L — HIGH (ref 15–37)
BASE EXCESS BLDA CALC-SCNC: 1 MMOL/L — SIGNIFICANT CHANGE UP (ref -2–2)
BASOPHILS # BLD AUTO: 0 K/UL — SIGNIFICANT CHANGE UP (ref 0–0.2)
BASOPHILS NFR BLD AUTO: 0 % — SIGNIFICANT CHANGE UP (ref 0–2)
BILIRUB SERPL-MCNC: 0.3 MG/DL — SIGNIFICANT CHANGE UP (ref 0.2–1.2)
BLOOD GAS COMMENTS ARTERIAL: SIGNIFICANT CHANGE UP
BUN SERPL-MCNC: 12 MG/DL — SIGNIFICANT CHANGE UP (ref 7–23)
CALCIUM SERPL-MCNC: 8.8 MG/DL — SIGNIFICANT CHANGE UP (ref 8.5–10.1)
CHLORIDE SERPL-SCNC: 106 MMOL/L — SIGNIFICANT CHANGE UP (ref 96–108)
CO2 SERPL-SCNC: 25 MMOL/L — SIGNIFICANT CHANGE UP (ref 22–31)
CREAT SERPL-MCNC: 0.74 MG/DL — SIGNIFICANT CHANGE UP (ref 0.5–1.3)
EOSINOPHIL # BLD AUTO: 0.54 K/UL — HIGH (ref 0–0.5)
EOSINOPHIL NFR BLD AUTO: 2 % — SIGNIFICANT CHANGE UP (ref 0–6)
GLUCOSE SERPL-MCNC: 83 MG/DL — SIGNIFICANT CHANGE UP (ref 70–99)
HCO3 BLDA-SCNC: 24 MMOL/L — SIGNIFICANT CHANGE UP (ref 21–29)
HCT VFR BLD CALC: 27.6 % — LOW (ref 34.5–45)
HGB BLD-MCNC: 8.7 G/DL — LOW (ref 11.5–15.5)
LYMPHOCYTES # BLD AUTO: 1.34 K/UL — SIGNIFICANT CHANGE UP (ref 1–3.3)
LYMPHOCYTES # BLD AUTO: 5 % — LOW (ref 13–44)
MCHC RBC-ENTMCNC: 29.2 PG — SIGNIFICANT CHANGE UP (ref 27–34)
MCHC RBC-ENTMCNC: 31.5 GM/DL — LOW (ref 32–36)
MCV RBC AUTO: 92.6 FL — SIGNIFICANT CHANGE UP (ref 80–100)
MONOCYTES # BLD AUTO: 1.88 K/UL — HIGH (ref 0–0.9)
MONOCYTES NFR BLD AUTO: 7 % — SIGNIFICANT CHANGE UP (ref 2–14)
NEUTROPHILS # BLD AUTO: 21.78 K/UL — HIGH (ref 1.8–7.4)
NEUTROPHILS NFR BLD AUTO: 81 % — HIGH (ref 43–77)
NRBC # BLD: SIGNIFICANT CHANGE UP /100 WBCS (ref 0–0)
NT-PROBNP SERPL-SCNC: 615 PG/ML — HIGH (ref 0–125)
PCO2 BLDA: 35 MMHG — SIGNIFICANT CHANGE UP (ref 32–46)
PH BLDA: 7.46 — HIGH (ref 7.35–7.45)
PLATELET # BLD AUTO: 474 K/UL — HIGH (ref 150–400)
PO2 BLDA: 80 MMHG — SIGNIFICANT CHANGE UP (ref 74–108)
POTASSIUM SERPL-MCNC: 3.7 MMOL/L — SIGNIFICANT CHANGE UP (ref 3.5–5.3)
POTASSIUM SERPL-SCNC: 3.7 MMOL/L — SIGNIFICANT CHANGE UP (ref 3.5–5.3)
PROT SERPL-MCNC: 5.6 GM/DL — LOW (ref 6–8.3)
RBC # BLD: 2.98 M/UL — LOW (ref 3.8–5.2)
RBC # FLD: 17.4 % — HIGH (ref 10.3–14.5)
SAO2 % BLDA: 96 % — SIGNIFICANT CHANGE UP (ref 92–96)
SODIUM SERPL-SCNC: 139 MMOL/L — SIGNIFICANT CHANGE UP (ref 135–145)
WBC # BLD: 26.89 K/UL — HIGH (ref 3.8–10.5)
WBC # FLD AUTO: 26.89 K/UL — HIGH (ref 3.8–10.5)

## 2021-05-16 PROCEDURE — 99239 HOSP IP/OBS DSCHRG MGMT >30: CPT

## 2021-05-16 RX ORDER — FERROUS SULFATE 325(65) MG
1 TABLET ORAL
Qty: 30 | Refills: 0
Start: 2021-05-16 | End: 2021-06-14

## 2021-05-16 RX ORDER — ASCORBIC ACID 60 MG
1 TABLET,CHEWABLE ORAL
Qty: 30 | Refills: 0
Start: 2021-05-16 | End: 2021-06-14

## 2021-05-16 RX ORDER — TIOTROPIUM BROMIDE 18 UG/1
1 CAPSULE ORAL; RESPIRATORY (INHALATION) DAILY
Refills: 0 | Status: DISCONTINUED | OUTPATIENT
Start: 2021-05-16 | End: 2021-05-16

## 2021-05-16 RX ORDER — ALBUTEROL 90 UG/1
2 AEROSOL, METERED ORAL
Qty: 1 | Refills: 0
Start: 2021-05-16 | End: 2021-06-14

## 2021-05-16 RX ORDER — TIOTROPIUM BROMIDE 18 UG/1
1 CAPSULE ORAL; RESPIRATORY (INHALATION)
Qty: 1 | Refills: 0
Start: 2021-05-16 | End: 2021-06-14

## 2021-05-16 RX ADMIN — Medication 81 MILLIGRAM(S): at 09:38

## 2021-05-16 RX ADMIN — ENOXAPARIN SODIUM 40 MILLIGRAM(S): 100 INJECTION SUBCUTANEOUS at 09:38

## 2021-05-16 RX ADMIN — Medication 325 MILLIGRAM(S): at 09:37

## 2021-05-16 RX ADMIN — MONTELUKAST 10 MILLIGRAM(S): 4 TABLET, CHEWABLE ORAL at 09:38

## 2021-05-16 RX ADMIN — Medication 2000 UNIT(S): at 09:38

## 2021-05-16 RX ADMIN — PANTOPRAZOLE SODIUM 40 MILLIGRAM(S): 20 TABLET, DELAYED RELEASE ORAL at 09:38

## 2021-05-16 RX ADMIN — Medication 500 MILLIGRAM(S): at 09:38

## 2021-05-16 RX ADMIN — DESVENLAFAXINE 100 MILLIGRAM(S): 50 TABLET, EXTENDED RELEASE ORAL at 09:40

## 2021-05-16 NOTE — CONSULT NOTE ADULT - ASSESSMENT
77 y/o  female with Hx of CAD s/p sten, HLD, depression, stage II R breast cancer ERE/CT positive HER 2 neg  2021 s/p R mastectomy with  expander  ALND, on adjuvant chemotherapy with taxotere cytoxan ( per Dr Lanier) cycle # 5 on 5/4/21 - now with profound weakness with minimal exertion, poor po intake.  Neutropenia due to chemotherapy ( expected rebeca) - will check if had neulasta.  Anemia due to chemotherapy Check irons.  Fatigue- due to chemo- unusual for TC but possible and cumulative  Slight elevation in LFT's - could be due to taxotere.  No PE .  No infiltrates on CT chest but monitor- might not see infiltrates with profound neutropenia. No fever. No antibiotics for now. Cultures .  Poor po intake. IVF Encourage nutrition.     
75 yo F with a PMH CAD s/p stent, depression, HLD, breast cancer s/p RT/mastectomy/lumpectomy (on chemotherapy), and insomnia who presents with weakness. She has been on chemotherapy every 21 days for the past few months, last one on 5/4 (9 days ago). She had tolerated chemotherapy but a couple days after the last treatment, she developed severe weakness. She has since had tremendous difficulty with walking or other minimal exertion. She denies dizziness while walking, fevers, chills, cough, chest pain, abdominal pain, nausea, vomiting, diarrhea, blood in her stool or urine, dysuria, or rash/swelling. She states her next (and so far last) treatment was scheduled for 5/25.  Her breast cancer, according to paperwork, is stage IIB.    In the ED, she was given Trazodone 300 mg PO x1 and NS 1L x1. (12 May 2021 23:46)  pt is seen and examined. CT scan reviewed with DR Rios today  pt is informed regrading results and need for fu  hypoxemia on exertion  use of incentive spirometry  Home O2 therapy needed    Assessment / plan;  Breast ca undergoing chemo therapy  admitted with generalized weakness  CHO is multifactorial / Anemia / Basilar atelectasis / Mild COPD  No active bronchospasm  Hypoxemia on exertion  CTA without PE  no clinical evidence of pneumonia but certainly at risk for it  CXR findings without clear etiology for developing right sided, small pleura; effusion- needs fu  leukocytosis after Rx / admitted with leukopenia post chemo / non toxic    Add Symbicort  use incentive spirometry  get RA ABG for eval  Home O2 set up  outpt PFT / cxr repeat needed  if increased pleural effusion, will need thoracentesis / not a good candidate now etiology unclear-discussed with pt and hospitalist today)  Decrease dose of trazodone used to 100 mg Q hs for insomnia reported  outpt sleep study needed with HST    
Dyspnea  Weakness  Anemia Leukopenia  s/p chemotherapy  CAD, stable  Hyperlipidemia    Suggest:    Cardiac status is stable.  On high dose Lipitor. Check lipids, adjust dose in setting of poor nutritional status during chemotherapy.  Right arm edema, r/o DVT. May be secondary to axillary swelling and post op changes s/p mastectomy  D/W Dr Megan Worthington

## 2021-05-16 NOTE — CHART NOTE - NSCHARTNOTEFT_GEN_A_CORE
HOME O2 EVALUATION    Pulse Ox Room Air Rest: 97%    Pulse Ox Room Air Ambulatin%    Pulse Ox Post Ambulation: 96%
HOME O2 EVALUATION    Pulse Ox Room Air Rest: 97%    Pulse Ox Room Air Ambulatin%    Pulse Ox on O2           2L Ambulatin%    Pulse Ox Post Ambulation: 96%

## 2021-05-16 NOTE — PROVIDER CONTACT NOTE (CHANGE IN STATUS NOTIFICATION) - BACKGROUND
Patient endorses 12 lb weight gain since admission and was diuresed today with IV lasix. CXR showed pleural effusion.

## 2021-05-16 NOTE — CONSULT NOTE ADULT - SUBJECTIVE AND OBJECTIVE BOX
Patient is a 76y old  Female who presents with a chief complaint of acute leukopenia, weakness (15 May 2021 13:58)      HPI:  77 yo F with a PMH CAD s/p stent, depression, HLD, breast cancer s/p RT/mastectomy/lumpectomy (on chemotherapy), and insomnia who presents with weakness. She has been on chemotherapy every 21 days for the past few months, last one on 5/4 (9 days ago). She had tolerated chemotherapy but a couple days after the last treatment, she developed severe weakness. She has since had tremendous difficulty with walking or other minimal exertion. She denies dizziness while walking, fevers, chills, cough, chest pain, abdominal pain, nausea, vomiting, diarrhea, blood in her stool or urine, dysuria, or rash/swelling. She states her next (and so far last) treatment was scheduled for 5/25.  Her breast cancer, according to paperwork, is stage IIB.    In the ED, she was given Trazodone 300 mg PO x1 and NS 1L x1. (12 May 2021 23:46)  pt is seen and examined. CT scan reviewed with DR Rios today  pt is informed regrading results and need for fu  hypoxemia on exertion  use of incentive spirometry  Home O2 therapy  prior tobacco use hx with mild COPD changes      PAST MEDICAL & SURGICAL HISTORY:  Depression    Hiatal hernia    Lumbar radiculopathy    Lumbar spondylosis    History of coronary artery disease  s/p stent    Breast cancer  left breast- lumpectomy and mastectomy, and radiation - CURRENTLY RIGHT BREAST IDC    HLD (hyperlipidemia)    H/O: hysterectomy    H/O heart artery stent    H/O lumpectomy    H/O left mastectomy        PREVIOUS DIAGNOSTIC TESTING:      MEDICATIONS  (STANDING):  ascorbic acid 500 milliGRAM(s) Oral daily  aspirin enteric coated 81 milliGRAM(s) Oral daily  atorvastatin 80 milliGRAM(s) Oral at bedtime  cholecalciferol 2000 Unit(s) Oral daily  desvenlafaxine  milliGRAM(s) Oral daily  enoxaparin Injectable 40 milliGRAM(s) SubCutaneous daily  ferrous    sulfate 325 milliGRAM(s) Oral daily  magnesium citrate 210 mg 3 Tablet(s) 3 Tablet(s) Oral at bedtime  montelukast 10 milliGRAM(s) Oral daily  pantoprazole    Tablet 40 milliGRAM(s) Oral before breakfast  traZODone 300 milliGRAM(s) Oral at bedtime    MEDICATIONS  (PRN):  ALBUTerol    90 MICROgram(s) HFA Inhaler 2 Puff(s) Inhalation every 6 hours PRN Shortness of Breath and/or Wheezing  bisacodyl 5 milliGRAM(s) Oral every 12 hours PRN Constipation  ondansetron Injectable 4 milliGRAM(s) IV Push every 6 hours PRN Nausea and/or Vomiting      FAMILY HISTORY:  FH: heart disease  father        SOCIAL HISTORY:  prior smoking hx    REVIEW OF SYSTEM:  Pertinent items are noted in HPI.      Vital Signs Last 24 Hrs  T(C): 36.9 (16 May 2021 04:57), Max: 37.1 (15 May 2021 16:34)  T(F): 98.4 (16 May 2021 04:57), Max: 98.7 (15 May 2021 16:34)  HR: 79 (16 May 2021 04:57) (79 - 86)  BP: 89/33 (16 May 2021 04:57) (83/35 - 93/47)  BP(mean): --  RR: 18 (16 May 2021 04:57) (18 - 18)  SpO2: 98% (16 May 2021 04:57) (95% - 100%)    I&O's Summary    15 May 2021 07:01  -  16 May 2021 07:00  --------------------------------------------------------  IN: 240 mL / OUT: 0 mL / NET: 240 mL      PHYSICAL EXAM  General Appearance: cooperative, no acute distress,   HEENT: PERRL, conjunctiva clear, EOM's intact, non injected pharynx, no exudate, TM   normal  Neck: Supple, , no adenopathy, thyroid: not enlarged, no carotid bruit or JVD  Back: Symmetric, no  tenderness,no soft tissue tenderness  Lungs: dullness to percussion right lower lobe / decreased breath sounds  no wheezing  Heart: Regular rate and rhythm, S1, S2 normal, no murmur, rub or gallop  Abdomen: Soft, non-tender, bowel sounds active , no hepatosplenomegaly  Extremities: no cyanosis or edema, no joint swelling  Skin: Skin color, texture normal, no rashes   Neurologic: Alert and oriented X3 , cranial nerves intact, sensory and motor normal,    ECG:    LABS:                          8.7    26.89 )-----------( 474      ( 16 May 2021 07:14 )             27.6     05-16    139  |  106  |  12  ----------------------------<  83  3.7   |  25  |  0.74    Ca    8.8      16 May 2021 07:14  Phos  2.5     05-14  Mg     2.4     05-15    TPro  5.6<L>  /  Alb  2.3<L>  /  TBili  0.3  /  DBili  x   /  AST  42<H>  /  ALT  36  /  AlkPhos  144<H>  05-16    CARDIAC MARKERS ( 14 May 2021 10:41 )  0.021 ng/mL / x     / x     / x     / x          Lipid Panel  125  18  --  140    Pro BNP  615 05-16 @ 07:14  D Dimer  -- 05-16 @ 07:14  Pro BNP  1047 05-15 @ 07:30  D Dimer  -- 05-15 @ 07:30  Pro BNP  876 05-13 @ 06:55  D Dimer  -- 05-13 @ 06:55    t< from: Xray Chest 1 View-PORTABLE IMMEDIATE (Xray Chest 1 View-PORTABLE IMMEDIATE .) (05.15.21 @ 14:18) >    PROCEDURE DATE:  05/15/2021          INTERPRETATION:  Clinical history: Hypoxia    Portable chest    Increasing right basilar consolidation. This is likely associated with pleural effusion. Left lung remains clear.    IMPRESSION: Increasing right basilar consolidation likely with pleural effusion.    < end of copied text >  < from: CT Angio Chest PE Protocol w/ IV Cont (05.12.21 @ 19:10) >  PROCEDURE:  CT Angiography of the Chest.  Sagittal and coronal reformats were performed as well as 3D (MIP) reconstructions.    FINDINGS:    LUNGS AND AIRWAYS: Patent central airways.  Mild emphysematous changes. Biapical scarring, greater on the right. Atelectatic changes.  PLEURA: Small right pleural effusion.  MEDIASTINUM AND TASHIA: No lymphadenopathy.  VESSELS: Within normal limits. No pulmonary emboli visualized.  HEART: Heart size is normal. No pericardial effusion.  CHEST WALL AND LOWER NECK: Status post right mastectomy and axillary node dissection. Prominent soft tissue adjacent to the right axillary clips. This may represent postsurgical changes although no active neoplasm cannot be excluded. Please correlate clinically. Right breast prosthesis with tissue expander. Overlying skin thickening and subcutaneous edema. Small fluid collection along the lateral inferior aspect.  VISUALIZED UPPER ABDOMEN: 2.8 cm cyst in the upper pole of the right kidney.  BONES: Minimal degenerative changes.    IMPRESSION:  No pulmonary emboli. The graft small right pleural effusion. Mild COPD.Biapical scarring.    Prominent soft tissue adjacent to the right axillary clips. This may represent postsurgical changes although tumor cannot be excluded. Small fluid adjacent to the outer inferior aspect of the right breast prosthesis.    ec< from: TTE Echo Complete w/o Contrast w/ Doppler (05.15.21 @ 14:52) >   Summary     The left ventricle is normal in size, wall thickness, and contractility.   Estimated left ventricular ejection fraction is 55 %.   Mild diastolic dysfunction (stage I).   Normal appearing right ventricle structure and function.   Trace mitral regurgitation.   Trace tricuspid valve regurgitation.    < end of copied text >      < end of copied text >            RADIOLOGY & ADDITIONAL STUDIES:

## 2021-05-17 PROBLEM — E78.5 HYPERLIPIDEMIA, UNSPECIFIED: Chronic | Status: ACTIVE | Noted: 2021-05-12

## 2021-05-18 ENCOUNTER — APPOINTMENT (OUTPATIENT)
Dept: ULTRASOUND IMAGING | Facility: IMAGING CENTER | Age: 76
End: 2021-05-18
Payer: MEDICARE

## 2021-05-18 ENCOUNTER — NON-APPOINTMENT (OUTPATIENT)
Age: 76
End: 2021-05-18

## 2021-05-18 ENCOUNTER — OUTPATIENT (OUTPATIENT)
Dept: OUTPATIENT SERVICES | Facility: HOSPITAL | Age: 76
LOS: 1 days | End: 2021-05-18
Payer: MEDICARE

## 2021-05-18 ENCOUNTER — RESULT REVIEW (OUTPATIENT)
Age: 76
End: 2021-05-18

## 2021-05-18 ENCOUNTER — APPOINTMENT (OUTPATIENT)
Dept: PLASTIC SURGERY | Facility: CLINIC | Age: 76
End: 2021-05-18
Payer: MEDICARE

## 2021-05-18 VITALS
HEIGHT: 61 IN | DIASTOLIC BLOOD PRESSURE: 54 MMHG | BODY MASS INDEX: 25.86 KG/M2 | HEART RATE: 75 BPM | TEMPERATURE: 97.8 F | WEIGHT: 137 LBS | SYSTOLIC BLOOD PRESSURE: 103 MMHG | OXYGEN SATURATION: 99 %

## 2021-05-18 DIAGNOSIS — Z98.890 OTHER SPECIFIED POSTPROCEDURAL STATES: Chronic | ICD-10-CM

## 2021-05-18 DIAGNOSIS — Z95.5 PRESENCE OF CORONARY ANGIOPLASTY IMPLANT AND GRAFT: Chronic | ICD-10-CM

## 2021-05-18 DIAGNOSIS — Z00.8 ENCOUNTER FOR OTHER GENERAL EXAMINATION: ICD-10-CM

## 2021-05-18 DIAGNOSIS — Z90.710 ACQUIRED ABSENCE OF BOTH CERVIX AND UTERUS: Chronic | ICD-10-CM

## 2021-05-18 DIAGNOSIS — Z90.12 ACQUIRED ABSENCE OF LEFT BREAST AND NIPPLE: Chronic | ICD-10-CM

## 2021-05-18 PROCEDURE — 99211 OFF/OP EST MAY X REQ PHY/QHP: CPT

## 2021-05-18 PROCEDURE — 76641 ULTRASOUND BREAST COMPLETE: CPT | Mod: 26,RT

## 2021-05-18 PROCEDURE — 76641 ULTRASOUND BREAST COMPLETE: CPT

## 2021-05-19 ENCOUNTER — APPOINTMENT (OUTPATIENT)
Dept: PLASTIC SURGERY | Facility: CLINIC | Age: 76
End: 2021-05-19
Payer: MEDICARE

## 2021-05-19 ENCOUNTER — LABORATORY RESULT (OUTPATIENT)
Age: 76
End: 2021-05-19

## 2021-05-19 PROCEDURE — 99212 OFFICE O/P EST SF 10 MIN: CPT

## 2021-05-20 ENCOUNTER — APPOINTMENT (OUTPATIENT)
Dept: MAMMOGRAPHY | Facility: CLINIC | Age: 76
End: 2021-05-20

## 2021-05-20 ENCOUNTER — APPOINTMENT (OUTPATIENT)
Dept: PLASTIC SURGERY | Facility: CLINIC | Age: 76
End: 2021-05-20
Payer: MEDICARE

## 2021-05-20 PROCEDURE — 99211 OFF/OP EST MAY X REQ PHY/QHP: CPT

## 2021-05-24 ENCOUNTER — APPOINTMENT (OUTPATIENT)
Dept: PLASTIC SURGERY | Facility: CLINIC | Age: 76
End: 2021-05-24
Payer: MEDICARE

## 2021-05-24 PROCEDURE — 99212 OFFICE O/P EST SF 10 MIN: CPT

## 2021-05-24 NOTE — PHYSICAL EXAM
[de-identified] : alert, calm, cooperative\par  [de-identified] : respirations are even and unlabored\par  [de-identified] : Right breast with tissue expander in place, NAC viable, incision intact, mild erythema noted, 70 cc or clear, nonpurulent yellow serous fluid aspirated

## 2021-05-24 NOTE — HISTORY OF PRESENT ILLNESS
[FreeTextEntry1] : ALVAREZ LÓPEZ is a 76 year old female who presents for a postop evaluation.  Patient is s/p right breast reconstruction with prepectoral tissue expander and ADM following right nipple/areola-sparing mastectomy 01/06/21.  Postoperative course complicated by right breast cellulitis and seroma.  Patient appreciated increased swelling of the right breast over the weekend.  Denies fever, chills, malaise.  \par

## 2021-05-25 ENCOUNTER — APPOINTMENT (OUTPATIENT)
Age: 76
End: 2021-05-25

## 2021-05-26 ENCOUNTER — RESULT REVIEW (OUTPATIENT)
Age: 76
End: 2021-05-26

## 2021-05-26 ENCOUNTER — APPOINTMENT (OUTPATIENT)
Dept: HEMATOLOGY ONCOLOGY | Facility: CLINIC | Age: 76
End: 2021-05-26
Payer: MEDICARE

## 2021-05-26 VITALS — BODY MASS INDEX: 26.08 KG/M2 | HEART RATE: 82 BPM | TEMPERATURE: 98.4 F | WEIGHT: 138 LBS

## 2021-05-26 DIAGNOSIS — Z95.5 PRESENCE OF CORONARY ANGIOPLASTY IMPLANT AND GRAFT: ICD-10-CM

## 2021-05-26 DIAGNOSIS — C50.911 MALIGNANT NEOPLASM OF UNSPECIFIED SITE OF RIGHT FEMALE BREAST: ICD-10-CM

## 2021-05-26 DIAGNOSIS — R53.1 WEAKNESS: ICD-10-CM

## 2021-05-26 DIAGNOSIS — T50.995A ADVERSE EFFECT OF OTHER DRUGS, MEDICAMENTS AND BIOLOGICAL SUBSTANCES, INITIAL ENCOUNTER: ICD-10-CM

## 2021-05-26 DIAGNOSIS — I25.10 ATHEROSCLEROTIC HEART DISEASE OF NATIVE CORONARY ARTERY WITHOUT ANGINA PECTORIS: ICD-10-CM

## 2021-05-26 DIAGNOSIS — Z79.82 LONG TERM (CURRENT) USE OF ASPIRIN: ICD-10-CM

## 2021-05-26 DIAGNOSIS — Z79.899 OTHER LONG TERM (CURRENT) DRUG THERAPY: ICD-10-CM

## 2021-05-26 DIAGNOSIS — R06.00 DYSPNEA, UNSPECIFIED: ICD-10-CM

## 2021-05-26 DIAGNOSIS — J98.11 ATELECTASIS: ICD-10-CM

## 2021-05-26 DIAGNOSIS — K59.00 CONSTIPATION, UNSPECIFIED: ICD-10-CM

## 2021-05-26 DIAGNOSIS — R09.02 HYPOXEMIA: ICD-10-CM

## 2021-05-26 DIAGNOSIS — D64.9 ANEMIA, UNSPECIFIED: ICD-10-CM

## 2021-05-26 DIAGNOSIS — L03.113 CELLULITIS OF RIGHT UPPER LIMB: ICD-10-CM

## 2021-05-26 DIAGNOSIS — E86.1 HYPOVOLEMIA: ICD-10-CM

## 2021-05-26 DIAGNOSIS — I50.31 ACUTE DIASTOLIC (CONGESTIVE) HEART FAILURE: ICD-10-CM

## 2021-05-26 DIAGNOSIS — D64.81 ANEMIA DUE TO ANTINEOPLASTIC CHEMOTHERAPY: ICD-10-CM

## 2021-05-26 DIAGNOSIS — M47.26 OTHER SPONDYLOSIS WITH RADICULOPATHY, LUMBAR REGION: ICD-10-CM

## 2021-05-26 DIAGNOSIS — Z20.822 CONTACT WITH AND (SUSPECTED) EXPOSURE TO COVID-19: ICD-10-CM

## 2021-05-26 DIAGNOSIS — G47.00 INSOMNIA, UNSPECIFIED: ICD-10-CM

## 2021-05-26 DIAGNOSIS — F33.40 MAJOR DEPRESSIVE DISORDER, RECURRENT, IN REMISSION, UNSPECIFIED: ICD-10-CM

## 2021-05-26 DIAGNOSIS — J44.9 CHRONIC OBSTRUCTIVE PULMONARY DISEASE, UNSPECIFIED: ICD-10-CM

## 2021-05-26 DIAGNOSIS — D70.1 AGRANULOCYTOSIS SECONDARY TO CANCER CHEMOTHERAPY: ICD-10-CM

## 2021-05-26 DIAGNOSIS — E87.1 HYPO-OSMOLALITY AND HYPONATREMIA: ICD-10-CM

## 2021-05-26 DIAGNOSIS — T45.1X5A AGRANULOCYTOSIS SECONDARY TO CANCER CHEMOTHERAPY: ICD-10-CM

## 2021-05-26 DIAGNOSIS — R62.7 ADULT FAILURE TO THRIVE: ICD-10-CM

## 2021-05-26 DIAGNOSIS — Z88.0 ALLERGY STATUS TO PENICILLIN: ICD-10-CM

## 2021-05-26 DIAGNOSIS — R94.31 ABNORMAL ELECTROCARDIOGRAM [ECG] [EKG]: ICD-10-CM

## 2021-05-26 DIAGNOSIS — M79.89 OTHER SPECIFIED SOFT TISSUE DISORDERS: ICD-10-CM

## 2021-05-26 DIAGNOSIS — R33.9 RETENTION OF URINE, UNSPECIFIED: ICD-10-CM

## 2021-05-26 DIAGNOSIS — R74.01 ELEVATION OF LEVELS OF LIVER TRANSAMINASE LEVELS: ICD-10-CM

## 2021-05-26 DIAGNOSIS — M81.0 AGE-RELATED OSTEOPOROSIS W/OUT CURRENT PATHOLOGICAL FRACTURE: ICD-10-CM

## 2021-05-26 DIAGNOSIS — Z90.11 ACQUIRED ABSENCE OF RIGHT BREAST AND NIPPLE: ICD-10-CM

## 2021-05-26 DIAGNOSIS — E78.5 HYPERLIPIDEMIA, UNSPECIFIED: ICD-10-CM

## 2021-05-26 DIAGNOSIS — I34.0 NONRHEUMATIC MITRAL (VALVE) INSUFFICIENCY: ICD-10-CM

## 2021-05-26 DIAGNOSIS — R79.89 OTHER SPECIFIED ABNORMAL FINDINGS OF BLOOD CHEMISTRY: ICD-10-CM

## 2021-05-26 LAB
ALBUMIN SERPL ELPH-MCNC: 3.6 G/DL — SIGNIFICANT CHANGE UP (ref 3.3–5)
ALP SERPL-CCNC: 106 U/L — SIGNIFICANT CHANGE UP (ref 40–120)
ALT FLD-CCNC: 22 U/L — SIGNIFICANT CHANGE UP (ref 10–45)
ANION GAP SERPL CALC-SCNC: 12 MMOL/L — SIGNIFICANT CHANGE UP (ref 5–17)
AST SERPL-CCNC: 32 U/L — SIGNIFICANT CHANGE UP (ref 10–40)
BASOPHILS # BLD AUTO: 0.08 K/UL — SIGNIFICANT CHANGE UP (ref 0–0.2)
BASOPHILS NFR BLD AUTO: 1.4 % — SIGNIFICANT CHANGE UP (ref 0–2)
BILIRUB SERPL-MCNC: <0.2 MG/DL — SIGNIFICANT CHANGE UP (ref 0.2–1.2)
BUN SERPL-MCNC: 22 MG/DL — SIGNIFICANT CHANGE UP (ref 7–23)
CALCIUM SERPL-MCNC: 9.7 MG/DL — SIGNIFICANT CHANGE UP (ref 8.4–10.5)
CHLORIDE SERPL-SCNC: 100 MMOL/L — SIGNIFICANT CHANGE UP (ref 96–108)
CO2 SERPL-SCNC: 25 MMOL/L — SIGNIFICANT CHANGE UP (ref 22–31)
CREAT SERPL-MCNC: 1.22 MG/DL — SIGNIFICANT CHANGE UP (ref 0.5–1.3)
EOSINOPHIL # BLD AUTO: 0.07 K/UL — SIGNIFICANT CHANGE UP (ref 0–0.5)
EOSINOPHIL NFR BLD AUTO: 1.2 % — SIGNIFICANT CHANGE UP (ref 0–6)
GLUCOSE SERPL-MCNC: 94 MG/DL — SIGNIFICANT CHANGE UP (ref 70–99)
HCT VFR BLD CALC: 32.9 % — LOW (ref 34.5–45)
HGB BLD-MCNC: 10 G/DL — LOW (ref 11.5–15.5)
IMM GRANULOCYTES NFR BLD AUTO: 0.3 % — SIGNIFICANT CHANGE UP (ref 0–1.5)
LYMPHOCYTES # BLD AUTO: 1.16 K/UL — SIGNIFICANT CHANGE UP (ref 1–3.3)
LYMPHOCYTES # BLD AUTO: 19.6 % — SIGNIFICANT CHANGE UP (ref 13–44)
MCHC RBC-ENTMCNC: 29.6 PG — SIGNIFICANT CHANGE UP (ref 27–34)
MCHC RBC-ENTMCNC: 30.4 GM/DL — LOW (ref 32–36)
MCV RBC AUTO: 97.3 FL — SIGNIFICANT CHANGE UP (ref 80–100)
MONOCYTES # BLD AUTO: 0.58 K/UL — SIGNIFICANT CHANGE UP (ref 0–0.9)
MONOCYTES NFR BLD AUTO: 9.8 % — SIGNIFICANT CHANGE UP (ref 2–14)
NEUTROPHILS # BLD AUTO: 4 K/UL — SIGNIFICANT CHANGE UP (ref 1.8–7.4)
NEUTROPHILS NFR BLD AUTO: 67.7 % — SIGNIFICANT CHANGE UP (ref 43–77)
NRBC # BLD: 0 /100 WBCS — SIGNIFICANT CHANGE UP (ref 0–0)
PLATELET # BLD AUTO: 608 K/UL — HIGH (ref 150–400)
POTASSIUM SERPL-MCNC: 5.6 MMOL/L — HIGH (ref 3.5–5.3)
POTASSIUM SERPL-SCNC: 5.6 MMOL/L — HIGH (ref 3.5–5.3)
PROT SERPL-MCNC: 6.2 G/DL — SIGNIFICANT CHANGE UP (ref 6–8.3)
RBC # BLD: 3.38 M/UL — LOW (ref 3.8–5.2)
RBC # FLD: 19.3 % — HIGH (ref 10.3–14.5)
SARS-COV-2 N GENE NPH QL NAA+PROBE: NOT DETECTED
SODIUM SERPL-SCNC: 137 MMOL/L — SIGNIFICANT CHANGE UP (ref 135–145)
WBC # BLD: 5.91 K/UL — SIGNIFICANT CHANGE UP (ref 3.8–10.5)
WBC # FLD AUTO: 5.91 K/UL — SIGNIFICANT CHANGE UP (ref 3.8–10.5)

## 2021-05-26 PROCEDURE — 99214 OFFICE O/P EST MOD 30 MIN: CPT

## 2021-05-26 RX ORDER — UBIDECARENONE/VIT E ACET 100MG-5
50 MCG CAPSULE ORAL
Refills: 0 | Status: ACTIVE | COMMUNITY

## 2021-05-26 RX ORDER — SULFAMETHOXAZOLE AND TRIMETHOPRIM 800; 160 MG/1; MG/1
800-160 TABLET ORAL TWICE DAILY
Qty: 14 | Refills: 0 | Status: DISCONTINUED | COMMUNITY
Start: 2021-04-20 | End: 2021-05-26

## 2021-05-26 RX ORDER — UBIDECARENONE 200 MG
CAPSULE ORAL
Refills: 0 | Status: ACTIVE | COMMUNITY

## 2021-05-26 RX ORDER — PROCHLORPERAZINE MALEATE 10 MG/1
10 TABLET ORAL EVERY 6 HOURS
Qty: 30 | Refills: 3 | Status: DISCONTINUED | COMMUNITY
Start: 2021-01-27 | End: 2021-05-26

## 2021-05-26 NOTE — ASSESSMENT
[FreeTextEntry1] : Patient is a 76 y.o.,Germline genetic testing negative, with bilateral metachronous breast cancers: a remote hx of a left breast cancer in 2010 s/p lumpectomy and XRT, and now an ER+, HER2- left sided BC s/p right nipple sparing mastectomy with expander reconstruction for stage IIB, prognostic IIA disease.\par \par 2/9/21 - Started on adjuvant chemo with Taxotere/Cytoxan - plan was for 6, however she had cumulative weakness after C#5 and so C#6 (due today) was held.  .  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 \par  \par Plan: \par 1. Weakness:  Patient now getting PT at home, and son also more available and supportive.  \par 2. Anemia: Will monitor - nutritional studies were done in  [place].  Ferritin high indicating inflammation.  Suspect some aspect of anemia due to inflammation from issues with breast implant.  \par 3. Onc - Patient ER+ - will need to start on hormone replacement.  She asked me to discuss with her  - Briefly reviewed difference between Tamoxifen and AI's.  Patient has osteoporosis.  States was on Prolia but needed to stop 2nd to a jaw issue.  Would prefer not to take any bone medicines as now fearful of side effects.  She has had a hysterectomy. Hx of birth control use - no hx of VTE's.  \par After reviewing R/B of both classes she is leaning towards Tamoxifen.  She has an appointment on 6/15/21 - aware must keep to d/w Dr. FRANK at that time.  Could consider starting now however patient still feeling somewhat lousy - would like her to feel better prior to starting. She was in agreement. \par \par RTO 6/15/21 for discussion on hormonal therapy.

## 2021-05-26 NOTE — PHYSICAL EXAM
[Normal] : affect appropriate [de-identified] : anicteric [de-identified] : no c/c/e [de-identified] : no rashes

## 2021-05-26 NOTE — HISTORY OF PRESENT ILLNESS
[Disease: _____________________] : Disease: [unfilled] [T: ___] : T[unfilled] [N: ___] : N[unfilled] [AJCC Stage: ____] : AJCC Stage: [unfilled] [de-identified] : ALVAREZ LÓPEZ is a 76 y.o. with a PMH significant for HLD, NSTEMI 6/2018 -> stent, and lumbar spondylosis, who we are following for bilateral metachronous breast cancers: a remote hx of a left breast cancer in 2010 s/p lumpectomy and XRT, and now an ER+, HER2- left sided stage IIB BC, prognostic IIA.  Germline genetic testing negative.\par \par 10/7/20 - Routine mammo/Sono: New architectural distortion central inferior right breast; on sono 1.5 x 1.3 x 0.8 cm heterogeneous mass at 7:00 N6\par 10/27/20 - US guided core biopsy R breast 7:00 N6 - PATH - moderately differentiated infiltrating ductal carcinoma (6/9), no LVI.   ER 90%, VA 40%, HER2 negative.\par 11/10/20 - MRI breast - 2.1 x 1.6 x 2.0cm irregular enhancing mass in the right breast LOQ posteriorly which correlates to the newly diagnosed cancer. There is a focal NME in the lower central right breast measuring approximately 2 cm and located approximately 2 cm anterior to the known cancer. The right nipple is rotated far medially and there is underlying enhancement in the nipple areolar complex complex - ? artifactual vs. underlying mass. There is a right 2- 3 mm focus of enhancement in the upper slightly outer right breast.\par 1/6/21 - Right nipple sparing mastectomy with SLND -> ALND, expander reconstruction.  PATH - IDC x 2 sites.  Largest 2.4cm IDC (6/9) with extensive DCIS, cribriform and solid type, intermediate grade with necrosis. +LVI.  Second area was 1.9cm IDC (6 - 7/9) with focal DCIS, cribriform type with calcifications.  +LVI, +PNI.  \par 1 SLN+ (0.8cm, +IRLANDA), 10 additional LN's negative.  margins negative, but closest margin anterior 0.3cm.\par [id number] = IIB, prognostic IIA.\par \par 2/9/21 - 5/4/21 - Adjuvant chemotherapy with TC x 5.  Patient with hx of steroid psychosis; refused any pre/post steroids with treatment. \par ~ 4/12/21 noted R arm  felt tightness/pulling  upper part above elbow.  Not noticeably swollen.  Saw Dr. Smallwood 4/19/21 - US negative.  Resolved with antibiotics - was felt to be a mild cellulitis / early lymphedema. \par \par 5/12/21 - 5/16/21 - Was admitted to  for SOB, weakness, neutropenia.  Did not have fevers - did not get antibiotics.  \par CT Angio negative for PE. Small right pleural effusion. \par \par  [de-identified] : moderately differentiated infiltrating ductal carcinoma (6/9), no LVI [de-identified] : ER 90%, OR 40%, HER2 negative\par 10/30/20 - INVITAE - negative [de-identified] : Patient reports that since she was discharged she is still profoundly fatigued and weak, although slowly improving.  Son feels she has better color and more "spunk."  She us using a cane when out and a Rollator in her house.  She has PT at her home 2x a week.  She still has SOB. \par She has been seeing her plastic surgeon - right breast with swelling - they have been aspirating fluid.  Culture sent - results from yesterday negative for infection. She was started on antibiotics though for a breast cellulitis. \par Her son is now staying with her and helping her more. \par She denies any changes in her family, medical, or social history since her last visit of 3/30/21.

## 2021-05-26 NOTE — RESULTS/DATA
[FreeTextEntry1] : WBC:  5.91   ANC: 4.00   Hgb: 10.0  HCT: 32.9   MCV: 97.3  Plts: 608\par CMP: pending

## 2021-05-26 NOTE — REVIEW OF SYSTEMS
[Patient Intake Form Reviewed] : Patient intake form was reviewed [Fatigue] : fatigue [Muscle Weakness] : muscle weakness [Negative] : Allergic/Immunologic [Shortness Of Breath] : shortness of breath [FreeTextEntry9] : legs [de-identified] : memory loss

## 2021-05-26 NOTE — REASON FOR VISIT
[Follow-Up Visit] : a follow-up [Other: _____] : [unfilled] [FreeTextEntry2] : Breast Cancer - Adjuvant TC - D22 C5 - recent hospitalization

## 2021-05-27 DIAGNOSIS — R53.1 WEAKNESS: ICD-10-CM

## 2021-05-27 DIAGNOSIS — M81.0 AGE-RELATED OSTEOPOROSIS WITHOUT CURRENT PATHOLOGICAL FRACTURE: ICD-10-CM

## 2021-05-27 DIAGNOSIS — D64.9 ANEMIA, UNSPECIFIED: ICD-10-CM

## 2021-05-27 DIAGNOSIS — N61.0 MASTITIS WITHOUT ABSCESS: ICD-10-CM

## 2021-05-28 ENCOUNTER — APPOINTMENT (OUTPATIENT)
Dept: PLASTIC SURGERY | Facility: CLINIC | Age: 76
End: 2021-05-28
Payer: MEDICARE

## 2021-05-28 PROCEDURE — 99211 OFF/OP EST MAY X REQ PHY/QHP: CPT

## 2021-06-01 ENCOUNTER — APPOINTMENT (OUTPATIENT)
Age: 76
End: 2021-06-01

## 2021-06-01 ENCOUNTER — APPOINTMENT (OUTPATIENT)
Dept: PLASTIC SURGERY | Facility: CLINIC | Age: 76
End: 2021-06-01
Payer: MEDICARE

## 2021-06-01 PROCEDURE — 99212 OFFICE O/P EST SF 10 MIN: CPT

## 2021-06-04 DIAGNOSIS — L60.1 ONYCHOLYSIS: ICD-10-CM

## 2021-06-14 ENCOUNTER — OUTPATIENT (OUTPATIENT)
Dept: OUTPATIENT SERVICES | Facility: HOSPITAL | Age: 76
LOS: 1 days | Discharge: HOME | End: 2021-06-14

## 2021-06-14 DIAGNOSIS — C50.911 MALIGNANT NEOPLASM OF UNSPECIFIED SITE OF RIGHT FEMALE BREAST: ICD-10-CM

## 2021-06-14 DIAGNOSIS — Z95.5 PRESENCE OF CORONARY ANGIOPLASTY IMPLANT AND GRAFT: Chronic | ICD-10-CM

## 2021-06-14 DIAGNOSIS — Z90.710 ACQUIRED ABSENCE OF BOTH CERVIX AND UTERUS: Chronic | ICD-10-CM

## 2021-06-14 DIAGNOSIS — Z98.890 OTHER SPECIFIED POSTPROCEDURAL STATES: Chronic | ICD-10-CM

## 2021-06-14 DIAGNOSIS — Z90.12 ACQUIRED ABSENCE OF LEFT BREAST AND NIPPLE: Chronic | ICD-10-CM

## 2021-06-15 ENCOUNTER — RESULT REVIEW (OUTPATIENT)
Age: 76
End: 2021-06-15

## 2021-06-15 ENCOUNTER — APPOINTMENT (OUTPATIENT)
Dept: HEMATOLOGY ONCOLOGY | Facility: CLINIC | Age: 76
End: 2021-06-15
Payer: MEDICARE

## 2021-06-15 VITALS
TEMPERATURE: 97.8 F | DIASTOLIC BLOOD PRESSURE: 59 MMHG | WEIGHT: 138 LBS | HEART RATE: 83 BPM | SYSTOLIC BLOOD PRESSURE: 103 MMHG | BODY MASS INDEX: 26.08 KG/M2

## 2021-06-15 LAB
BASOPHILS # BLD AUTO: 0.05 K/UL — SIGNIFICANT CHANGE UP (ref 0–0.2)
BASOPHILS NFR BLD AUTO: 0.7 % — SIGNIFICANT CHANGE UP (ref 0–2)
EOSINOPHIL # BLD AUTO: 0.18 K/UL — SIGNIFICANT CHANGE UP (ref 0–0.5)
EOSINOPHIL NFR BLD AUTO: 2.4 % — SIGNIFICANT CHANGE UP (ref 0–6)
HCT VFR BLD CALC: 34.8 % — SIGNIFICANT CHANGE UP (ref 34.5–45)
HGB BLD-MCNC: 11.1 G/DL — LOW (ref 11.5–15.5)
IMM GRANULOCYTES NFR BLD AUTO: 0.4 % — SIGNIFICANT CHANGE UP (ref 0–1.5)
LYMPHOCYTES # BLD AUTO: 1.47 K/UL — SIGNIFICANT CHANGE UP (ref 1–3.3)
LYMPHOCYTES # BLD AUTO: 19.4 % — SIGNIFICANT CHANGE UP (ref 13–44)
MCHC RBC-ENTMCNC: 31.6 PG — SIGNIFICANT CHANGE UP (ref 27–34)
MCHC RBC-ENTMCNC: 31.9 GM/DL — LOW (ref 32–36)
MCV RBC AUTO: 99.1 FL — SIGNIFICANT CHANGE UP (ref 80–100)
MONOCYTES # BLD AUTO: 0.63 K/UL — SIGNIFICANT CHANGE UP (ref 0–0.9)
MONOCYTES NFR BLD AUTO: 8.3 % — SIGNIFICANT CHANGE UP (ref 2–14)
NEUTROPHILS # BLD AUTO: 5.21 K/UL — SIGNIFICANT CHANGE UP (ref 1.8–7.4)
NEUTROPHILS NFR BLD AUTO: 68.8 % — SIGNIFICANT CHANGE UP (ref 43–77)
NRBC # BLD: 0 /100 WBCS — SIGNIFICANT CHANGE UP (ref 0–0)
PLATELET # BLD AUTO: 433 K/UL — HIGH (ref 150–400)
RBC # BLD: 3.51 M/UL — LOW (ref 3.8–5.2)
RBC # FLD: 20.1 % — HIGH (ref 10.3–14.5)
WBC # BLD: 7.57 K/UL — SIGNIFICANT CHANGE UP (ref 3.8–10.5)
WBC # FLD AUTO: 7.57 K/UL — SIGNIFICANT CHANGE UP (ref 3.8–10.5)

## 2021-06-15 PROCEDURE — 99215 OFFICE O/P EST HI 40 MIN: CPT

## 2021-06-28 ENCOUNTER — LABORATORY RESULT (OUTPATIENT)
Age: 76
End: 2021-06-28

## 2021-06-28 ENCOUNTER — APPOINTMENT (OUTPATIENT)
Dept: PLASTIC SURGERY | Facility: CLINIC | Age: 76
End: 2021-06-28
Payer: MEDICARE

## 2021-06-28 PROCEDURE — 99212 OFFICE O/P EST SF 10 MIN: CPT

## 2021-07-06 ENCOUNTER — APPOINTMENT (OUTPATIENT)
Dept: PLASTIC SURGERY | Facility: CLINIC | Age: 76
End: 2021-07-06
Payer: MEDICARE

## 2021-07-06 VITALS
SYSTOLIC BLOOD PRESSURE: 101 MMHG | OXYGEN SATURATION: 96 % | DIASTOLIC BLOOD PRESSURE: 68 MMHG | TEMPERATURE: 97.5 F | BODY MASS INDEX: 25.11 KG/M2 | HEIGHT: 61 IN | HEART RATE: 85 BPM | WEIGHT: 133 LBS

## 2021-07-06 PROCEDURE — 99024 POSTOP FOLLOW-UP VISIT: CPT

## 2021-08-12 ENCOUNTER — APPOINTMENT (OUTPATIENT)
Dept: PLASTIC SURGERY | Facility: CLINIC | Age: 76
End: 2021-08-12
Payer: MEDICARE

## 2021-08-12 DIAGNOSIS — C50.911 MALIGNANT NEOPLASM OF UNSPECIFIED SITE OF RIGHT FEMALE BREAST: ICD-10-CM

## 2021-08-12 PROCEDURE — 99214 OFFICE O/P EST MOD 30 MIN: CPT

## 2021-08-13 NOTE — HISTORY OF PRESENT ILLNESS
[FreeTextEntry1] : ALVAREZ LÓPEZ is a 76 year F who presents s/p right breast NSM and reconstruction with TE in 1/2021. Her post-operative course was complicated by erythema and seroma, which was treated with Abx, aspiration, and overfilling of the TE. She now presents desiring replacement of TE with a silicone implant, and symmetrizing mastopexy of the contralateral breast, to be performed in conjunction with Dr. Rueda.

## 2021-08-13 NOTE — PHYSICAL EXAM
[NI] : Normal [de-identified] : NAD, AxOx3 [de-identified] : Right breast surgical scars well healed, NAC viable. Tissue expander in place. No open wounds or drainage, no signs of infection. \par 60cc removed from the TE (330cc - 60cc = 270cc)\par \par Left breast with grade 3 ptosis, deflation, and volume loss. \par No palpable masses, no discharge. \par SN-N 24\par N-IMF 8\par BW 11

## 2021-08-13 NOTE — REVIEW OF SYSTEMS
[Fever] : no fever [Chills] : no chills [Skin Lesions] : no skin lesions [Skin Wound] : no skin wound [Breast Pain] : no breast pain

## 2021-08-16 NOTE — H&P PST ADULT - FALL HARM RISK TYPE OF ASSESSMENT
Banner Transposition Flap Text: The defect edges were debeveled with a #15 scalpel blade.  Given the location of the defect and the proximity to free margins a Banner transposition flap was deemed most appropriate.  Using a sterile surgical marker, an appropriate flap drawn around the defect. The area thus outlined was incised deep to adipose tissue with a #15 scalpel blade.  The skin margins were undermined to an appropriate distance in all directions utilizing iris scissors. Admission

## 2021-09-02 NOTE — PROGRESS NOTE ADULT - PROBLEM/PLAN-3
Dani Mcleod is a 15year old female.   HPI:     Weight History    Patient here to start weight management program, the following questionnaire was completed with patient to assess areas of intervention and plan of care:    When did patient become overweigh Medication Sig Dispense Refill   • metFORMIN HCl  MG Oral Tablet 24 Hr Take 4 tablets by mouth daily. History reviewed. No pertinent past medical history.    Social History:  Social History    Tobacco Use      Smoking status: Never Smoker a regular exercise program, instruction on behavior modification techniques, and may involve the use of anti-obesity medications.  Medications are not necessarily part of the program.Patient further understand that if medications are used, they have been us phentermine, at this time she is doing Metformin 2000 mg/day, will continue current management, we discussed that phentermine at this age will be off label but also if it would be started will be a long-term treatment option.     Was discussed that the main DISPLAY PLAN FREE TEXT

## 2021-09-20 ENCOUNTER — OUTPATIENT (OUTPATIENT)
Dept: OUTPATIENT SERVICES | Facility: HOSPITAL | Age: 76
LOS: 1 days | Discharge: HOME | End: 2021-09-20

## 2021-09-20 DIAGNOSIS — Z98.890 OTHER SPECIFIED POSTPROCEDURAL STATES: Chronic | ICD-10-CM

## 2021-09-20 DIAGNOSIS — Z90.12 ACQUIRED ABSENCE OF LEFT BREAST AND NIPPLE: Chronic | ICD-10-CM

## 2021-09-20 DIAGNOSIS — Z90.710 ACQUIRED ABSENCE OF BOTH CERVIX AND UTERUS: Chronic | ICD-10-CM

## 2021-09-20 DIAGNOSIS — Z95.5 PRESENCE OF CORONARY ANGIOPLASTY IMPLANT AND GRAFT: Chronic | ICD-10-CM

## 2021-09-20 DIAGNOSIS — C50.111 MALIGNANT NEOPLASM OF CENTRAL PORTION OF RIGHT FEMALE BREAST: ICD-10-CM

## 2021-09-21 ENCOUNTER — RESULT REVIEW (OUTPATIENT)
Age: 76
End: 2021-09-21

## 2021-09-21 ENCOUNTER — APPOINTMENT (OUTPATIENT)
Dept: HEMATOLOGY ONCOLOGY | Facility: CLINIC | Age: 76
End: 2021-09-21
Payer: MEDICARE

## 2021-09-21 VITALS
HEART RATE: 65 BPM | WEIGHT: 138 LBS | BODY MASS INDEX: 26.08 KG/M2 | SYSTOLIC BLOOD PRESSURE: 118 MMHG | TEMPERATURE: 97.8 F | DIASTOLIC BLOOD PRESSURE: 65 MMHG

## 2021-09-21 LAB
24R-OH-CALCIDIOL SERPL-MCNC: 43.7 NG/ML — SIGNIFICANT CHANGE UP (ref 30–80)
ALBUMIN SERPL ELPH-MCNC: 4.4 G/DL — SIGNIFICANT CHANGE UP (ref 3.3–5)
ALP SERPL-CCNC: 73 U/L — SIGNIFICANT CHANGE UP (ref 40–120)
ALT FLD-CCNC: 13 U/L — SIGNIFICANT CHANGE UP (ref 10–45)
AST SERPL-CCNC: 22 U/L — SIGNIFICANT CHANGE UP (ref 10–40)
BASOPHILS # BLD AUTO: 0.03 K/UL — SIGNIFICANT CHANGE UP (ref 0–0.2)
BASOPHILS NFR BLD AUTO: 0.5 % — SIGNIFICANT CHANGE UP (ref 0–2)
BILIRUB DIRECT SERPL-MCNC: 0.1 MG/DL — SIGNIFICANT CHANGE UP (ref 0–0.2)
BILIRUB INDIRECT FLD-MCNC: 0.3 MG/DL — SIGNIFICANT CHANGE UP (ref 0.2–1.2)
BILIRUB SERPL-MCNC: 0.4 MG/DL — SIGNIFICANT CHANGE UP (ref 0.2–1.2)
EOSINOPHIL # BLD AUTO: 0.1 K/UL — SIGNIFICANT CHANGE UP (ref 0–0.5)
EOSINOPHIL NFR BLD AUTO: 1.5 % — SIGNIFICANT CHANGE UP (ref 0–6)
HCT VFR BLD CALC: 38.7 % — SIGNIFICANT CHANGE UP (ref 34.5–45)
HGB BLD-MCNC: 12.8 G/DL — SIGNIFICANT CHANGE UP (ref 11.5–15.5)
IMM GRANULOCYTES NFR BLD AUTO: 0.3 % — SIGNIFICANT CHANGE UP (ref 0–1.5)
LYMPHOCYTES # BLD AUTO: 1.32 K/UL — SIGNIFICANT CHANGE UP (ref 1–3.3)
LYMPHOCYTES # BLD AUTO: 20 % — SIGNIFICANT CHANGE UP (ref 13–44)
MCHC RBC-ENTMCNC: 32.1 PG — SIGNIFICANT CHANGE UP (ref 27–34)
MCHC RBC-ENTMCNC: 33.1 GM/DL — SIGNIFICANT CHANGE UP (ref 32–36)
MCV RBC AUTO: 97 FL — SIGNIFICANT CHANGE UP (ref 80–100)
MONOCYTES # BLD AUTO: 0.47 K/UL — SIGNIFICANT CHANGE UP (ref 0–0.9)
MONOCYTES NFR BLD AUTO: 7.1 % — SIGNIFICANT CHANGE UP (ref 2–14)
NEUTROPHILS # BLD AUTO: 4.65 K/UL — SIGNIFICANT CHANGE UP (ref 1.8–7.4)
NEUTROPHILS NFR BLD AUTO: 70.6 % — SIGNIFICANT CHANGE UP (ref 43–77)
NRBC # BLD: 0 /100 WBCS — SIGNIFICANT CHANGE UP (ref 0–0)
PLATELET # BLD AUTO: 289 K/UL — SIGNIFICANT CHANGE UP (ref 150–400)
PROT SERPL-MCNC: 6.9 G/DL — SIGNIFICANT CHANGE UP (ref 6–8.3)
RBC # BLD: 3.99 M/UL — SIGNIFICANT CHANGE UP (ref 3.8–5.2)
RBC # FLD: 11.9 % — SIGNIFICANT CHANGE UP (ref 10.3–14.5)
WBC # BLD: 6.59 K/UL — SIGNIFICANT CHANGE UP (ref 3.8–10.5)
WBC # FLD AUTO: 6.59 K/UL — SIGNIFICANT CHANGE UP (ref 3.8–10.5)

## 2021-09-21 PROCEDURE — 99214 OFFICE O/P EST MOD 30 MIN: CPT

## 2021-09-21 NOTE — ASSESSMENT
[FreeTextEntry1] : Patient is a 76 y.o with bilateral metachronous breast cancers\par Remote hx of a left breast cancer in 2010 s/p lumpectomy and XRT\par 2021 ER+, HER2- left sided BC s/p right nipple sparing mastectomy with expander reconstruction for stage IIB, prognostic IIA disease.\par \par Adjuvant chemotherapy TC x 5  2/9/21 -  5/4/21 ( cycle 6 th omitted due to toxicity) \par \par On  TAmoxifen since  June 2021. Tolerating well. Plan for total 10 years of adjuvant hormonal therapy.\par \par She will have expander to implant exchange right breast in October 4, 2021. Will stop Tamoxifen now, resume 2 weeks after surgery\par \par Return visit in 6  months

## 2021-09-21 NOTE — REVIEW OF SYSTEMS
[Patient Intake Form Reviewed] : Patient intake form was reviewed [Negative] : Allergic/Immunologic [Fatigue] : no fatigue [Shortness Of Breath] : no shortness of breath [Muscle Weakness] : no muscle weakness [FreeTextEntry2] : better

## 2021-09-21 NOTE — HISTORY OF PRESENT ILLNESS
[Disease: _____________________] : Disease: [unfilled] [T: ___] : T[unfilled] [N: ___] : N[unfilled] [AJCC Stage: ____] : AJCC Stage: [unfilled] [de-identified] : ALVAREZ LÓPEZ is a 76 y.o. with a PMH significant for HLD, NSTEMI 6/2018 -> stent, and lumbar spondylosis, who we are following for bilateral metachronous breast cancers: a remote hx of a left breast cancer in 2010 s/p lumpectomy and XRT, and 20202 ER+, HER2- right sided stage IIB BC, prognostic IIA. \par \par 1/6/21 - Right nipple sparing mastectomy with SLND -> ALND, expander reconstruction.  PATH - IDC x 2 sites.  Largest 2.4cm IDC (6/9) with extensive DCIS, cribriform and solid type, intermediate grade with necrosis. +LVI.  Second area was 1.9cm IDC (6 - 7/9) with focal DCIS, cribriform type with calcifications.  +LVI, +PNI.  \par 1 SLN+ (0.8cm, +IRLANDA), 10 additional LN's negative.  margins negative, but closest margin anterior 0.3cm.\par [id number] = IIB, prognostic IIA.\par \par 2/9/21 - 5/4/21 - Adjuvant chemotherapy with TC x 5.  \par \par 5/12/21 - 5/16/21 - Was admitted to  for SOB, weakness, neutropenia.  Did not have fevers - did not get antibiotics.  \par CT Angio negative for PE. Small right pleural effusion. \par Chemotx stopped - would not tolerate cycle 6.\par \par Tamoxifen started June 2021. ( had hysterectomy;  no AI due to osteopenia/ osteoporosis and dental issues with Prolia) \par  [de-identified] : moderately differentiated infiltrating ductal carcinoma (6/9), no LVI [de-identified] : ER 90%, CA 40%, HER2 negative\par 10/30/20 - INVITAE - negative [de-identified] : Tolerating TAmoxifen well. No complaints.\par \par Had issues with fluid collection R breast- now all better. \par \par Scheduled for exchange of right breast expander to implant on Oct 4, 221 ( Dr Rueda)  \par \par \par

## 2021-09-21 NOTE — PHYSICAL EXAM
[Normal] : no peripheral adenopathy appreciated [de-identified] : looks well  [de-identified] : Right mastectomy with expander, left berast s/p lumpectomy, no palpable masses, no axillary adenopathy b/l

## 2021-09-21 NOTE — REASON FOR VISIT
[Follow-Up Visit] : a follow-up [Other: _____] : [unfilled] [FreeTextEntry2] : stage II  right breast cancer  on adjuvant TAmoxifen

## 2021-09-23 DIAGNOSIS — C50.811 MALIGNANT NEOPLASM OF OVERLAPPING SITES OF RIGHT FEMALE BREAST: ICD-10-CM

## 2021-09-23 DIAGNOSIS — Z51.81 ENCOUNTER FOR THERAPEUTIC DRUG LEVEL MONITORING: ICD-10-CM

## 2021-09-30 ENCOUNTER — OUTPATIENT (OUTPATIENT)
Dept: OUTPATIENT SERVICES | Facility: HOSPITAL | Age: 76
LOS: 1 days | End: 2021-09-30
Payer: MEDICARE

## 2021-09-30 ENCOUNTER — RESULT REVIEW (OUTPATIENT)
Age: 76
End: 2021-09-30

## 2021-09-30 VITALS
RESPIRATION RATE: 16 BRPM | HEART RATE: 62 BPM | HEIGHT: 61 IN | OXYGEN SATURATION: 99 % | WEIGHT: 136.03 LBS | TEMPERATURE: 98 F | SYSTOLIC BLOOD PRESSURE: 153 MMHG | DIASTOLIC BLOOD PRESSURE: 64 MMHG

## 2021-09-30 DIAGNOSIS — Z90.12 ACQUIRED ABSENCE OF LEFT BREAST AND NIPPLE: ICD-10-CM

## 2021-09-30 DIAGNOSIS — Z90.11 ACQUIRED ABSENCE OF RIGHT BREAST AND NIPPLE: Chronic | ICD-10-CM

## 2021-09-30 DIAGNOSIS — Z90.710 ACQUIRED ABSENCE OF BOTH CERVIX AND UTERUS: Chronic | ICD-10-CM

## 2021-09-30 DIAGNOSIS — Z90.12 ACQUIRED ABSENCE OF LEFT BREAST AND NIPPLE: Chronic | ICD-10-CM

## 2021-09-30 DIAGNOSIS — Z98.890 OTHER SPECIFIED POSTPROCEDURAL STATES: Chronic | ICD-10-CM

## 2021-09-30 DIAGNOSIS — Z95.5 PRESENCE OF CORONARY ANGIOPLASTY IMPLANT AND GRAFT: Chronic | ICD-10-CM

## 2021-09-30 DIAGNOSIS — Z85.3 PERSONAL HISTORY OF MALIGNANT NEOPLASM OF BREAST: ICD-10-CM

## 2021-09-30 LAB
ANION GAP SERPL CALC-SCNC: 1 MMOL/L — LOW (ref 5–17)
APPEARANCE UR: CLEAR — SIGNIFICANT CHANGE UP
APTT BLD: 25.1 SEC — LOW (ref 27.5–35.5)
BASOPHILS # BLD AUTO: 0.04 K/UL — SIGNIFICANT CHANGE UP (ref 0–0.2)
BASOPHILS NFR BLD AUTO: 0.6 % — SIGNIFICANT CHANGE UP (ref 0–2)
BILIRUB UR-MCNC: NEGATIVE — SIGNIFICANT CHANGE UP
BUN SERPL-MCNC: 19 MG/DL — SIGNIFICANT CHANGE UP (ref 7–23)
CALCIUM SERPL-MCNC: 9.5 MG/DL — SIGNIFICANT CHANGE UP (ref 8.5–10.1)
CHLORIDE SERPL-SCNC: 103 MMOL/L — SIGNIFICANT CHANGE UP (ref 96–108)
CO2 SERPL-SCNC: 32 MMOL/L — HIGH (ref 22–31)
COLOR SPEC: YELLOW — SIGNIFICANT CHANGE UP
CREAT SERPL-MCNC: 1 MG/DL — SIGNIFICANT CHANGE UP (ref 0.5–1.3)
DIFF PNL FLD: NEGATIVE — SIGNIFICANT CHANGE UP
EOSINOPHIL # BLD AUTO: 0.1 K/UL — SIGNIFICANT CHANGE UP (ref 0–0.5)
EOSINOPHIL NFR BLD AUTO: 1.5 % — SIGNIFICANT CHANGE UP (ref 0–6)
GLUCOSE SERPL-MCNC: 86 MG/DL — SIGNIFICANT CHANGE UP (ref 70–99)
GLUCOSE UR QL: NEGATIVE MG/DL — SIGNIFICANT CHANGE UP
HCT VFR BLD CALC: 40.5 % — SIGNIFICANT CHANGE UP (ref 34.5–45)
HGB BLD-MCNC: 13.5 G/DL — SIGNIFICANT CHANGE UP (ref 11.5–15.5)
IMM GRANULOCYTES NFR BLD AUTO: 0.4 % — SIGNIFICANT CHANGE UP (ref 0–1.5)
INR BLD: 0.95 RATIO — SIGNIFICANT CHANGE UP (ref 0.88–1.16)
KETONES UR-MCNC: NEGATIVE — SIGNIFICANT CHANGE UP
LEUKOCYTE ESTERASE UR-ACNC: ABNORMAL
LYMPHOCYTES # BLD AUTO: 1.31 K/UL — SIGNIFICANT CHANGE UP (ref 1–3.3)
LYMPHOCYTES # BLD AUTO: 19.6 % — SIGNIFICANT CHANGE UP (ref 13–44)
MCHC RBC-ENTMCNC: 32.1 PG — SIGNIFICANT CHANGE UP (ref 27–34)
MCHC RBC-ENTMCNC: 33.3 GM/DL — SIGNIFICANT CHANGE UP (ref 32–36)
MCV RBC AUTO: 96.2 FL — SIGNIFICANT CHANGE UP (ref 80–100)
MONOCYTES # BLD AUTO: 0.51 K/UL — SIGNIFICANT CHANGE UP (ref 0–0.9)
MONOCYTES NFR BLD AUTO: 7.6 % — SIGNIFICANT CHANGE UP (ref 2–14)
NEUTROPHILS # BLD AUTO: 4.68 K/UL — SIGNIFICANT CHANGE UP (ref 1.8–7.4)
NEUTROPHILS NFR BLD AUTO: 70.3 % — SIGNIFICANT CHANGE UP (ref 43–77)
NITRITE UR-MCNC: NEGATIVE — SIGNIFICANT CHANGE UP
PH UR: 8 — SIGNIFICANT CHANGE UP (ref 5–8)
PLATELET # BLD AUTO: 356 K/UL — SIGNIFICANT CHANGE UP (ref 150–400)
POTASSIUM SERPL-MCNC: 4.9 MMOL/L — SIGNIFICANT CHANGE UP (ref 3.5–5.3)
POTASSIUM SERPL-SCNC: 4.9 MMOL/L — SIGNIFICANT CHANGE UP (ref 3.5–5.3)
PROT UR-MCNC: NEGATIVE MG/DL — SIGNIFICANT CHANGE UP
PROTHROM AB SERPL-ACNC: 11.1 SEC — SIGNIFICANT CHANGE UP (ref 10.6–13.6)
RBC # BLD: 4.21 M/UL — SIGNIFICANT CHANGE UP (ref 3.8–5.2)
RBC # FLD: 12 % — SIGNIFICANT CHANGE UP (ref 10.3–14.5)
SODIUM SERPL-SCNC: 136 MMOL/L — SIGNIFICANT CHANGE UP (ref 135–145)
SP GR SPEC: 1.01 — SIGNIFICANT CHANGE UP (ref 1.01–1.02)
UROBILINOGEN FLD QL: NEGATIVE MG/DL — SIGNIFICANT CHANGE UP
WBC # BLD: 6.67 K/UL — SIGNIFICANT CHANGE UP (ref 3.8–10.5)
WBC # FLD AUTO: 6.67 K/UL — SIGNIFICANT CHANGE UP (ref 3.8–10.5)

## 2021-09-30 PROCEDURE — 85610 PROTHROMBIN TIME: CPT

## 2021-09-30 PROCEDURE — 93010 ELECTROCARDIOGRAM REPORT: CPT

## 2021-09-30 PROCEDURE — 87640 STAPH A DNA AMP PROBE: CPT

## 2021-09-30 PROCEDURE — 85025 COMPLETE CBC W/AUTO DIFF WBC: CPT

## 2021-09-30 PROCEDURE — 80048 BASIC METABOLIC PNL TOTAL CA: CPT

## 2021-09-30 PROCEDURE — 36415 COLL VENOUS BLD VENIPUNCTURE: CPT

## 2021-09-30 PROCEDURE — 86900 BLOOD TYPING SEROLOGIC ABO: CPT

## 2021-09-30 PROCEDURE — 93005 ELECTROCARDIOGRAM TRACING: CPT

## 2021-09-30 PROCEDURE — 86850 RBC ANTIBODY SCREEN: CPT

## 2021-09-30 PROCEDURE — 87641 MR-STAPH DNA AMP PROBE: CPT

## 2021-09-30 PROCEDURE — 86901 BLOOD TYPING SEROLOGIC RH(D): CPT

## 2021-09-30 PROCEDURE — G0463: CPT | Mod: 25

## 2021-09-30 PROCEDURE — 71046 X-RAY EXAM CHEST 2 VIEWS: CPT

## 2021-09-30 PROCEDURE — 85730 THROMBOPLASTIN TIME PARTIAL: CPT

## 2021-09-30 PROCEDURE — 71046 X-RAY EXAM CHEST 2 VIEWS: CPT | Mod: 26

## 2021-09-30 PROCEDURE — 81001 URINALYSIS AUTO W/SCOPE: CPT

## 2021-09-30 NOTE — H&P PST ADULT - ASSESSMENT
76 y.o female scheduled for Left Breast Mastopexy, Revision of Right Breast Reconstruction with Insertion of Implant  76 y.o female scheduled for Left Breast Mastopexy, Revision of Right Breast Reconstruction with Insertion of Implant   Plan  1. Stop all NSAIDS, herbal supplements and vitamins for 7 days. Directions re: Aspirin for surgery to be given by cardiology   2. NPO at midnight.  3. Take the following medications Pristiq, Omeprazole with small sip of water on morning of procedure/surgery   4. Use EZ sponges as directed  5. Use mupirocin as directed  6. Labs, EKG, CXR as per surgeon   7. Cardiologist Dr Vidales for optimization prior to surgery  8. COVID swab appt: 10/1/2021

## 2021-09-30 NOTE — H&P PST ADULT - HISTORY OF PRESENT ILLNESS
74 y.o  76 y.o female presents for PST with hx of right breast cancer. Patient was dx with right breast cancer in October 2020. She underwent a right mastectomy and also five cycles of chemo. She was admitted in May 2021 after a severe complication to her chemo. She recovered and has followed with both oncology and surgeon. Her hx is significant for left breast cancer over 10 yrs ago for which she had a lumpectomy & radiation.  Patient is now scheduled for Left Breast Mastopexy, Revision of Right Breast Reconstruction with Insertion of Implant

## 2021-09-30 NOTE — H&P PST ADULT - NSICDXPASTMEDICALHX_GEN_ALL_CORE_FT
PAST MEDICAL HISTORY:  Breast cancer left breast- lumpectomy and radiation >10 yrs ago  - CURRENTLY RIGHT BREAST IDC    COPD (chronic obstructive pulmonary disease) no daily inhalers    Depression     Hiatal hernia     History of coronary artery disease s/p stent--2017    HLD (hyperlipidemia)     Lumbar radiculopathy     Lumbar spondylosis

## 2021-09-30 NOTE — H&P PST ADULT - NSICDXPASTSURGICALHX_GEN_ALL_CORE_FT
PAST SURGICAL HISTORY:  H/O heart artery stent Cardiac stent---2017    H/O lumpectomy Left breast --and radiation > 10yrs ago    H/O right mastectomy Jan. 2021--and chemo. 5 cycles    H/O: hysterectomy

## 2021-09-30 NOTE — H&P PST ADULT - RS GEN PE MLT RESP DETAILS PC
breath sounds equal/clear to auscultation bilaterally/no rales/no rhonchi/no wheezes/diminished breath sounds, R

## 2021-09-30 NOTE — H&P PST ADULT - BREASTS COMMENTS
right breast well healed scar, site clear, no nipple discharge/inversion , left breast site clear no nipple discharge or inversion

## 2021-09-30 NOTE — H&P PST ADULT - ABILITY TO HEAR (WITH HEARING AID OR HEARING APPLIANCE IF NORMALLY USED):
left ear hearing aid/Mildly to Moderately Impaired: difficulty hearing in some environments or speaker may need to increase volume or speak distinctly

## 2021-10-01 ENCOUNTER — APPOINTMENT (OUTPATIENT)
Dept: DISASTER EMERGENCY | Facility: CLINIC | Age: 76
End: 2021-10-01

## 2021-10-01 DIAGNOSIS — Z90.12 ACQUIRED ABSENCE OF LEFT BREAST AND NIPPLE: ICD-10-CM

## 2021-10-01 DIAGNOSIS — Z85.3 PERSONAL HISTORY OF MALIGNANT NEOPLASM OF BREAST: ICD-10-CM

## 2021-10-01 LAB
MRSA PCR RESULT.: SIGNIFICANT CHANGE UP
S AUREUS DNA NOSE QL NAA+PROBE: SIGNIFICANT CHANGE UP

## 2021-10-01 RX ORDER — SODIUM CHLORIDE 9 MG/ML
1000 INJECTION, SOLUTION INTRAVENOUS
Refills: 0 | Status: DISCONTINUED | OUTPATIENT
Start: 2021-10-04 | End: 2021-10-04

## 2021-10-01 RX ORDER — OXYCODONE HYDROCHLORIDE 5 MG/1
10 TABLET ORAL ONCE
Refills: 0 | Status: DISCONTINUED | OUTPATIENT
Start: 2021-10-04 | End: 2021-10-04

## 2021-10-01 RX ORDER — ONDANSETRON 8 MG/1
4 TABLET, FILM COATED ORAL ONCE
Refills: 0 | Status: DISCONTINUED | OUTPATIENT
Start: 2021-10-04 | End: 2021-10-04

## 2021-10-01 RX ORDER — FENTANYL CITRATE 50 UG/ML
50 INJECTION INTRAVENOUS
Refills: 0 | Status: DISCONTINUED | OUTPATIENT
Start: 2021-10-04 | End: 2021-10-04

## 2021-10-02 LAB — SARS-COV-2 N GENE NPH QL NAA+PROBE: NOT DETECTED

## 2021-10-04 ENCOUNTER — RESULT REVIEW (OUTPATIENT)
Age: 76
End: 2021-10-04

## 2021-10-04 ENCOUNTER — APPOINTMENT (OUTPATIENT)
Dept: PLASTIC SURGERY | Facility: HOSPITAL | Age: 76
End: 2021-10-04
Payer: MEDICARE

## 2021-10-04 ENCOUNTER — OUTPATIENT (OUTPATIENT)
Dept: INPATIENT UNIT | Facility: HOSPITAL | Age: 76
LOS: 1 days | Discharge: HOME | End: 2021-10-04
Payer: MEDICARE

## 2021-10-04 VITALS
HEART RATE: 65 BPM | SYSTOLIC BLOOD PRESSURE: 109 MMHG | RESPIRATION RATE: 16 BRPM | DIASTOLIC BLOOD PRESSURE: 56 MMHG | WEIGHT: 136.69 LBS | TEMPERATURE: 97 F | HEIGHT: 61 IN | OXYGEN SATURATION: 97 %

## 2021-10-04 VITALS
HEART RATE: 73 BPM | OXYGEN SATURATION: 98 % | SYSTOLIC BLOOD PRESSURE: 113 MMHG | RESPIRATION RATE: 14 BRPM | DIASTOLIC BLOOD PRESSURE: 57 MMHG

## 2021-10-04 DIAGNOSIS — Z85.3 PERSONAL HISTORY OF MALIGNANT NEOPLASM OF BREAST: ICD-10-CM

## 2021-10-04 DIAGNOSIS — Z98.890 OTHER SPECIFIED POSTPROCEDURAL STATES: Chronic | ICD-10-CM

## 2021-10-04 DIAGNOSIS — Z95.5 PRESENCE OF CORONARY ANGIOPLASTY IMPLANT AND GRAFT: Chronic | ICD-10-CM

## 2021-10-04 DIAGNOSIS — Z90.11 ACQUIRED ABSENCE OF RIGHT BREAST AND NIPPLE: Chronic | ICD-10-CM

## 2021-10-04 DIAGNOSIS — Z90.710 ACQUIRED ABSENCE OF BOTH CERVIX AND UTERUS: Chronic | ICD-10-CM

## 2021-10-04 DIAGNOSIS — Z90.12 ACQUIRED ABSENCE OF LEFT BREAST AND NIPPLE: ICD-10-CM

## 2021-10-04 PROCEDURE — 12345: CPT | Mod: NC

## 2021-10-04 PROCEDURE — 19316 MASTOPEXY: CPT

## 2021-10-04 PROCEDURE — 88304 TISSUE EXAM BY PATHOLOGIST: CPT

## 2021-10-04 PROCEDURE — 88304 TISSUE EXAM BY PATHOLOGIST: CPT | Mod: 26

## 2021-10-04 PROCEDURE — C1789: CPT

## 2021-10-04 PROCEDURE — 19342 INSJ/RPLCMT BRST IMPLT SEP D: CPT | Mod: RT

## 2021-10-04 RX ORDER — ASPIRIN/CALCIUM CARB/MAGNESIUM 324 MG
1 TABLET ORAL
Qty: 0 | Refills: 0 | DISCHARGE

## 2021-10-04 RX ORDER — ROSUVASTATIN CALCIUM 5 MG/1
1 TABLET ORAL
Qty: 0 | Refills: 0 | DISCHARGE

## 2021-10-04 RX ORDER — TRAZODONE HCL 50 MG
3 TABLET ORAL
Qty: 0 | Refills: 0 | DISCHARGE

## 2021-10-04 RX ORDER — TAMOXIFEN CITRATE 20 MG/1
1 TABLET, FILM COATED ORAL
Qty: 0 | Refills: 0 | DISCHARGE

## 2021-10-04 RX ORDER — PREGABALIN 225 MG/1
0 CAPSULE ORAL
Qty: 0 | Refills: 0 | DISCHARGE

## 2021-10-04 RX ORDER — UBIDECARENONE 100 MG
0 CAPSULE ORAL
Qty: 0 | Refills: 0 | DISCHARGE

## 2021-10-04 RX ORDER — MULTIVIT WITH MIN/MFOLATE/K2 340-15/3 G
210 POWDER (GRAM) ORAL
Qty: 0 | Refills: 0 | DISCHARGE

## 2021-10-04 RX ORDER — DESVENLAFAXINE 50 MG/1
1 TABLET, EXTENDED RELEASE ORAL
Qty: 0 | Refills: 0 | DISCHARGE

## 2021-10-04 RX ORDER — OMEPRAZOLE 10 MG/1
1 CAPSULE, DELAYED RELEASE ORAL
Qty: 0 | Refills: 0 | DISCHARGE

## 2021-10-04 NOTE — BRIEF OPERATIVE NOTE - NSICDXBRIEFPROCEDURE_GEN_ALL_CORE_FT
PROCEDURES:  Mastopexy of left breast 04-Oct-2021 09:05:22  Sybil Soler  Revision of reconstruction of right breast 04-Oct-2021 09:05:44 insertion of implant Sybil Soler

## 2021-10-04 NOTE — ASU DISCHARGE PLAN (ADULT/PEDIATRIC) - CARE PROVIDER_API CALL
Gerry Rueda; HAROLDO)  Otolaryngology; Plastic Surgery  [address] [place] [address], [facility] [address]  [place] [address]  Phone: [phone]  Fax: [phone]  Scheduled Appointment: 10/06/2021

## 2021-10-04 NOTE — ASU PATIENT PROFILE, ADULT - ABILITY TO HEAR (WITH HEARING AID OR HEARING APPLIANCE IF NORMALLY USED):
left ear hearing aidv at home/Mildly to Moderately Impaired: difficulty hearing in some environments or speaker may need to increase volume or speak distinctly left ear hearing aid at home/Mildly to Moderately Impaired: difficulty hearing in some environments or speaker may need to increase volume or speak distinctly

## 2021-10-06 ENCOUNTER — APPOINTMENT (OUTPATIENT)
Dept: PLASTIC SURGERY | Facility: CLINIC | Age: 76
End: 2021-10-06
Payer: MEDICARE

## 2021-10-06 DIAGNOSIS — N39.0 URINARY TRACT INFECTION, SITE NOT SPECIFIED: ICD-10-CM

## 2021-10-06 PROBLEM — C50.919 MALIGNANT NEOPLASM OF UNSPECIFIED SITE OF UNSPECIFIED FEMALE BREAST: Chronic | Status: ACTIVE | Noted: 2020-12-31

## 2021-10-06 PROBLEM — Z86.79 PERSONAL HISTORY OF OTHER DISEASES OF THE CIRCULATORY SYSTEM: Chronic | Status: ACTIVE | Noted: 2020-12-31

## 2021-10-06 PROBLEM — J44.9 CHRONIC OBSTRUCTIVE PULMONARY DISEASE, UNSPECIFIED: Chronic | Status: ACTIVE | Noted: 2021-09-30

## 2021-10-06 PROCEDURE — 99024 POSTOP FOLLOW-UP VISIT: CPT

## 2021-10-06 NOTE — PHYSICAL EXAM
[de-identified] : alert, calm, cooperative\par  [de-identified] : respirations are even and unlabored\par  [de-identified] : bilateral breast incisions are well-approximated, c/d/i, with Prineo in place.  There is moderate, generalized edema.  No signs of wound dehiscence or fluctuance. Breasts are soft. RIght breast jd drain is to self suction with serosanguineous fluid appreciated.\par

## 2021-10-06 NOTE — REASON FOR VISIT
[Follow-Up: _____] : a [unfilled] follow-up visit [Post Op: _________] : a [unfilled] post op visit [Family Member] : family member

## 2021-10-06 NOTE — PHYSICAL EXAM
[de-identified] : alert, calm, cooperative\par  [de-identified] : respirations are even and unlabored\par  [de-identified] : bilateral breast incisions are well-approximated, c/d/i, with Prineo in place.  There is moderate, generalized edema.  No signs of wound dehiscence or fluctuance. Breasts are soft. RIght breast jd drain is to self suction with serosanguineous fluid appreciated.\par

## 2021-10-06 NOTE — HISTORY OF PRESENT ILLNESS
[FreeTextEntry1] : Mildred is a 76 year old female who presents for a postop evaluation.  Patient is s/p right  breast implant placement, left breast mastopexy on 10/4/21.  Patient has been healing well. She reports recurrent UTI following her surgery. Right breast drain output has been < 20 ml over the past 24 hours.

## 2021-10-07 DIAGNOSIS — Z90.710 ACQUIRED ABSENCE OF BOTH CERVIX AND UTERUS: ICD-10-CM

## 2021-10-07 DIAGNOSIS — I25.2 OLD MYOCARDIAL INFARCTION: ICD-10-CM

## 2021-10-07 DIAGNOSIS — Z88.8 ALLERGY STATUS TO OTHER DRUGS, MEDICAMENTS AND BIOLOGICAL SUBSTANCES: ICD-10-CM

## 2021-10-07 DIAGNOSIS — N65.0 DEFORMITY OF RECONSTRUCTED BREAST: ICD-10-CM

## 2021-10-07 DIAGNOSIS — E78.5 HYPERLIPIDEMIA, UNSPECIFIED: ICD-10-CM

## 2021-10-07 DIAGNOSIS — I34.0 NONRHEUMATIC MITRAL (VALVE) INSUFFICIENCY: ICD-10-CM

## 2021-10-07 DIAGNOSIS — Z85.3 PERSONAL HISTORY OF MALIGNANT NEOPLASM OF BREAST: ICD-10-CM

## 2021-10-07 DIAGNOSIS — Z88.5 ALLERGY STATUS TO NARCOTIC AGENT: ICD-10-CM

## 2021-10-07 DIAGNOSIS — Z79.82 LONG TERM (CURRENT) USE OF ASPIRIN: ICD-10-CM

## 2021-10-07 DIAGNOSIS — Z88.0 ALLERGY STATUS TO PENICILLIN: ICD-10-CM

## 2021-10-07 DIAGNOSIS — Z90.11 ACQUIRED ABSENCE OF RIGHT BREAST AND NIPPLE: ICD-10-CM

## 2021-10-07 DIAGNOSIS — I25.10 ATHEROSCLEROTIC HEART DISEASE OF NATIVE CORONARY ARTERY WITHOUT ANGINA PECTORIS: ICD-10-CM

## 2021-10-07 DIAGNOSIS — I10 ESSENTIAL (PRIMARY) HYPERTENSION: ICD-10-CM

## 2021-10-07 DIAGNOSIS — I27.20 PULMONARY HYPERTENSION, UNSPECIFIED: ICD-10-CM

## 2021-10-07 DIAGNOSIS — Z87.891 PERSONAL HISTORY OF NICOTINE DEPENDENCE: ICD-10-CM

## 2021-10-07 DIAGNOSIS — N65.1 DISPROPORTION OF RECONSTRUCTED BREAST: ICD-10-CM

## 2021-10-11 ENCOUNTER — APPOINTMENT (OUTPATIENT)
Dept: PLASTIC SURGERY | Facility: CLINIC | Age: 76
End: 2021-10-11

## 2021-10-11 ENCOUNTER — NON-APPOINTMENT (OUTPATIENT)
Age: 76
End: 2021-10-11

## 2021-10-21 ENCOUNTER — APPOINTMENT (OUTPATIENT)
Dept: PLASTIC SURGERY | Facility: CLINIC | Age: 76
End: 2021-10-21
Payer: MEDICARE

## 2021-10-21 DIAGNOSIS — Z90.11 ACQUIRED ABSENCE OF RIGHT BREAST AND NIPPLE: ICD-10-CM

## 2021-10-21 PROCEDURE — 99024 POSTOP FOLLOW-UP VISIT: CPT

## 2021-11-11 ENCOUNTER — OUTPATIENT (OUTPATIENT)
Dept: OUTPATIENT SERVICES | Facility: HOSPITAL | Age: 76
LOS: 1 days | End: 2021-11-11
Payer: MEDICARE

## 2021-11-11 ENCOUNTER — RESULT REVIEW (OUTPATIENT)
Age: 76
End: 2021-11-11

## 2021-11-11 ENCOUNTER — APPOINTMENT (OUTPATIENT)
Dept: ULTRASOUND IMAGING | Facility: CLINIC | Age: 76
End: 2021-11-11
Payer: MEDICARE

## 2021-11-11 ENCOUNTER — APPOINTMENT (OUTPATIENT)
Dept: MAMMOGRAPHY | Facility: CLINIC | Age: 76
End: 2021-11-11
Payer: MEDICARE

## 2021-11-11 DIAGNOSIS — Z95.5 PRESENCE OF CORONARY ANGIOPLASTY IMPLANT AND GRAFT: Chronic | ICD-10-CM

## 2021-11-11 DIAGNOSIS — Z98.890 OTHER SPECIFIED POSTPROCEDURAL STATES: Chronic | ICD-10-CM

## 2021-11-11 DIAGNOSIS — Z90.11 ACQUIRED ABSENCE OF RIGHT BREAST AND NIPPLE: Chronic | ICD-10-CM

## 2021-11-11 DIAGNOSIS — Z90.710 ACQUIRED ABSENCE OF BOTH CERVIX AND UTERUS: Chronic | ICD-10-CM

## 2021-11-11 DIAGNOSIS — Z85.3 PERSONAL HISTORY OF MALIGNANT NEOPLASM OF BREAST: ICD-10-CM

## 2021-11-11 PROCEDURE — 76641 ULTRASOUND BREAST COMPLETE: CPT | Mod: 26,LT

## 2021-11-11 PROCEDURE — G0279: CPT | Mod: 26

## 2021-11-11 PROCEDURE — 76641 ULTRASOUND BREAST COMPLETE: CPT

## 2021-11-11 PROCEDURE — 77065 DX MAMMO INCL CAD UNI: CPT

## 2021-11-11 PROCEDURE — 77065 DX MAMMO INCL CAD UNI: CPT | Mod: 26,LT

## 2021-11-11 PROCEDURE — G0279: CPT

## 2021-12-14 ENCOUNTER — APPOINTMENT (OUTPATIENT)
Dept: PLASTIC SURGERY | Facility: CLINIC | Age: 76
End: 2021-12-14

## 2021-12-16 NOTE — ED ADULT NURSE NOTE - CAS EDN INTEG ASSESS
Patient arrives from ENT office s/p surgical procedure to nose with c/o uncontrolled pain. C/o headache. Sent by provider for IV pain medications. WDL

## 2022-02-16 NOTE — ASSESSMENT
[FreeTextEntry1] : Patient is a 76 y.o with bilateral metachronous breast cancers\par Remote hx of a left breast cancer in 2010 s/p lumpectomy and XRT\par 2021 ER+, HER2- left sided BC s/p right nipple sparing mastectomy with expander reconstruction for stage IIB, prognostic IIA disease.\par \par Adjuvant chemotherapy TC x 5  2/9/21 -  5/4/21 ( cycle 6 th omitted due to toxicity) \par \par Discussed adjuvant hormonal therapy Tamoxifen versus AI.  Explained benefits and side effects.\par Has hx of osteoporosis - treated with Prolia - stopped due to developing dental problems ( ? ONJ).\par HAd hysterectomy.\par \par Recommend Tamoxifen 20 mg daily x 10 years.\par \par She will start now. Will need to stop Tamoxifen 2 weeks before breast surgery ( implants) \par \par Will d/w Rad  Onc if radiation consultation warranted.\par \par Return visit in 3 months

## 2022-02-16 NOTE — REASON FOR VISIT
[Follow-Up Visit] : a follow-up [Other: _____] : [unfilled] [FreeTextEntry2] : stage II  right breast cancer

## 2022-02-16 NOTE — PHYSICAL EXAM
[Normal] : affect appropriate [de-identified] : looks much better  [de-identified] : Right mastectomy with expander, skin slightly erythematous

## 2022-02-16 NOTE — REVIEW OF SYSTEMS
[Patient Intake Form Reviewed] : Patient intake form was reviewed [Shortness Of Breath] : shortness of breath [Negative] : Allergic/Immunologic [Fatigue] : no fatigue [Muscle Weakness] : no muscle weakness [FreeTextEntry2] : better

## 2022-02-16 NOTE — HISTORY OF PRESENT ILLNESS
[Disease: _____________________] : Disease: [unfilled] [T: ___] : T[unfilled] [N: ___] : N[unfilled] [AJCC Stage: ____] : AJCC Stage: [unfilled] [de-identified] : ALVAREZ LÓPEZ is a 76 y.o. with a PMH significant for HLD, NSTEMI 6/2018 -> stent, and lumbar spondylosis, who we are following for bilateral metachronous breast cancers: a remote hx of a left breast cancer in 2010 s/p lumpectomy and XRT, and 20202 ER+, HER2- right sided stage IIB BC, prognostic IIA. \par \par 1/6/21 - Right nipple sparing mastectomy with SLND -> ALND, expander reconstruction.  PATH - IDC x 2 sites.  Largest 2.4cm IDC (6/9) with extensive DCIS, cribriform and solid type, intermediate grade with necrosis. +LVI.  Second area was 1.9cm IDC (6 - 7/9) with focal DCIS, cribriform type with calcifications.  +LVI, +PNI.  \par 1 SLN+ (0.8cm, +IRLANDA), 10 additional LN's negative.  margins negative, but closest margin anterior 0.3cm.\par [id number] = IIB, prognostic IIA.\par \par 2/9/21 - 5/4/21 - Adjuvant chemotherapy with TC x 5.  \par \par 5/12/21 - 5/16/21 - Was admitted to  for SOB, weakness, neutropenia.  Did not have fevers - did not get antibiotics.  \par CT Angio negative for PE. Small right pleural effusion. \par Chemotx stopped - would not tolerate cycle 6.\par \par \par  [de-identified] : moderately differentiated infiltrating ductal carcinoma (6/9), no LVI [de-identified] : ER 90%, IL 40%, HER2 negative\par 10/30/20 - INVITAE - negative [de-identified] : Took her a while to recover after last chemo/ hospitalization- but now feel well. Energy much better, she is active. \par She had right breast aspiration/ antibiotics for possible cellulitis - treated by plastic surgery.\par Has expanders. Exchange for implants- planned for Sept- Oct. \par \par \par

## 2022-03-23 DIAGNOSIS — Z01.818 ENCOUNTER FOR OTHER PREPROCEDURAL EXAMINATION: ICD-10-CM

## 2022-03-23 DIAGNOSIS — N61.0 MASTITIS WITHOUT ABSCESS: ICD-10-CM

## 2022-03-23 DIAGNOSIS — T45.1X5A TOXIC GASTROENTERITIS AND COLITIS: ICD-10-CM

## 2022-03-23 DIAGNOSIS — K52.1 TOXIC GASTROENTERITIS AND COLITIS: ICD-10-CM

## 2022-03-23 DIAGNOSIS — Z92.29 PERSONAL HISTORY OF OTHER DRUG THERAPY: ICD-10-CM

## 2022-03-23 DIAGNOSIS — Z87.898 PERSONAL HISTORY OF OTHER SPECIFIED CONDITIONS: ICD-10-CM

## 2022-03-23 DIAGNOSIS — Z09 ENCOUNTER FOR FOLLOW-UP EXAMINATION AFTER COMPLETED TREATMENT FOR CONDITIONS OTHER THAN MALIGNANT NEOPLASM: ICD-10-CM

## 2022-03-23 RX ORDER — SULFAMETHOXAZOLE AND TRIMETHOPRIM 800; 160 MG/1; MG/1
800-160 TABLET ORAL
Qty: 14 | Refills: 0 | Status: DISCONTINUED | COMMUNITY
Start: 2021-10-01 | End: 2022-03-23

## 2022-03-23 RX ORDER — SULFAMETHOXAZOLE AND TRIMETHOPRIM 800; 160 MG/1; MG/1
800-160 TABLET ORAL
Qty: 14 | Refills: 0 | Status: DISCONTINUED | COMMUNITY
Start: 2021-09-30 | End: 2022-03-23

## 2022-03-23 RX ORDER — CIPROFLOXACIN HYDROCHLORIDE 500 MG/1
500 TABLET, FILM COATED ORAL
Qty: 14 | Refills: 1 | Status: DISCONTINUED | COMMUNITY
Start: 2021-10-06 | End: 2022-03-23

## 2022-03-23 RX ORDER — NITROFURANTOIN (MONOHYDRATE/MACROCRYSTALS) 25; 75 MG/1; MG/1
100 CAPSULE ORAL
Qty: 10 | Refills: 0 | Status: DISCONTINUED | COMMUNITY
Start: 2021-09-30 | End: 2022-03-23

## 2022-03-23 RX ORDER — SULFAMETHOXAZOLE AND TRIMETHOPRIM 800; 160 MG/1; MG/1
800-160 TABLET ORAL TWICE DAILY
Qty: 14 | Refills: 0 | Status: DISCONTINUED | COMMUNITY
Start: 2021-05-18 | End: 2022-03-23

## 2022-03-23 RX ORDER — CEFADROXIL 500 MG/1
500 CAPSULE ORAL TWICE DAILY
Qty: 14 | Refills: 0 | Status: DISCONTINUED | COMMUNITY
Start: 2021-06-28 | End: 2022-03-23

## 2022-04-20 NOTE — BEHAVIORAL HEALTH ASSESSMENT NOTE - PROBLEM/PLAN-3
In room for Nikki Golden's perineal exam, no redness or hardened nodule noted on visual exam of perineal area     Sandy Antonio RN  04/20/22 6400 DISPLAY PLAN FREE TEXT

## 2022-05-08 ENCOUNTER — NON-APPOINTMENT (OUTPATIENT)
Age: 77
End: 2022-05-08

## 2022-05-23 NOTE — BEHAVIORAL HEALTH ASSESSMENT NOTE - NS ED [facility] [employer] MEDICAL RECORD REVIEWED CONSENT OBTAINED YN
-- DO NOT REPLY / DO NOT REPLY ALL --  -- Message is from the [facility]--    Transfer to RN      Chief Complaint:  Patient is on blood thinners and bumped her leg.  It is swollen and patient has recently had a blood clot and heart attach.    Caller Information       Type Contact Phone    05/23/2022 03:17 PM CDT Phone (Incoming) Amy Castillo JOSE RAUL (Self) [phone] (M)          Provider Name:  Bettina ALANIS Practice Site Name:  [facility]    Alternative Phone Number:  none     Yes

## 2022-09-03 NOTE — H&P PST ADULT - URINARY CATHETER
History of Present Illness


Date of examination: 09/03/22


Date of admission: 


09/03/2022


Chief complaint: 





Generalized weakness


Malaise


History of present illness: 





52-year-old female with known history of diabetes mellitus, hypertension and 

stage V chronic kidney disease sent to the emergency room by the nephrologist 

Dr. Barillas for evaluation for possible dialysis.  Patient had complained of 

generalized weakness, loss of appetite and generalized malaise over the past few

days.  Lab test done shows the there is worsening of patient's kidney function 

and therefore was encouraged to report to the emergency room.





Work-up today, labs were significant for potassium 5.3, BUN of 66 and creatinine

of 6.9.  Glucose of 318.





Otherwise patient doing well today and denies any complaints.





Past History


Past Medical History: arthritis, diabetes, hypertension, renal failure


Past Surgical History: No surgical history


Social history: no significant social history


Family history: no significant family history





Medications and Allergies


                                    Allergies











Allergy/AdvReac Type Severity Reaction Status Date / Time


 


No Known Allergies Allergy   Verified 03/02/22 15:12











                                Home Medications











 Medication  Instructions  Recorded  Confirmed  Last Taken  Type


 


Ergocalciferol(Vitamin D2)(Nf) 50 mcg PO 1XW 03/02/22 09/03/22 08/28/22 History





[Vitamin D (Nf)]     


 


Insulin Aspart Protam & Aspart 20 unit SQ BID 03/02/22 09/03/22 1 Day Ago 

History





[NovoLOG Mix 70-30 Flexpen]    ~09/02/22 


 


carvediloL [Coreg] 25 mg PO BID 03/02/22 09/03/22 1 Day Ago History





    ~09/02/22 


 


oxyCODONE /ACETAMINOPHEN [Percocet 1 tab PO Q6HR PRN 03/02/22 09/03/22 Unknown 

History





5/325]     











Active Meds: 


Active Medications





Acetaminophen (Acetaminophen 325 Mg Tab)  650 mg PO Q4H PRN


   PRN Reason: Pain MILD(1-3)/Fever >100.5/HA


Dextrose (Dextrose 50% In Water (25gm) 50 Ml Syringe)  50 ml IV Q30MIN PRN; 

Protocol


   PRN Reason: Hypoglycemia


Insulin Human Lispro (Insulin Lispro 100 Unit/Ml)  0 unit SUB-Q ACHS MARJ; 

Protocol


Magnesium Hydroxide (Magnesium Hydroxide (Mom) Oral Liqd Udc)  30 ml PO Q4H PRN


   PRN Reason: Constipation


Morphine Sulfate (Morphine 2 Mg/1 Ml Inj)  2 mg IV Q4H PRN


   PRN Reason: Pain, Moderate (4-6)


Morphine Sulfate (Morphine 4 Mg/1 Ml Inj)  4 mg IV Q4H PRN


   PRN Reason: Pain , Severe (7-10)


Ondansetron HCl (Ondansetron 4 Mg/2 Ml Inj)  4 mg IV Q8H PRN


   PRN Reason: Nausea And Vomiting


Sodium Chloride (Sodium Chloride 0.9% 10 Ml Flush Syringe)  10 ml IV BID MARJ


Sodium Chloride (Sodium Chloride 0.9% 10 Ml Flush Syringe)  10 ml IV PRN PRN


   PRN Reason: LINE FLUSH











Review of Systems


Constitutional: weakness, malaise, no fever, no chills


Ears, nose, mouth and throat: no nasal congestion, no sore throat


Cardiovascular: no chest pain, no palpitations


Respiratory: no cough, no shortness of breath


Gastrointestinal: no abdominal pain, no nausea, no vomiting, no diarrhea


Genitourinary Female: no pelvic pain, no flank pain, no dysuria


Musculoskeletal: no neck pain, no low back pain


Integumentary: no rash, no pruritis


Neurological: no headaches, no confusion


Psychiatric: no anxiety, no depression


Endocrine: no polyphagia, no polydipsia, no polyuria, no nocturia





Exam





- Constitutional


Vitals: 


                                        











Temp Pulse Resp BP Pulse Ox


 


 98.1 F   74   22   166/77   98 


 


 09/02/22 23:01  09/03/22 04:00  09/03/22 04:00  09/03/22 04:00  09/03/22 04:00











General appearance: Present: no acute distress, well-nourished





- EENT


Eyes: Present: PERRL, EOM intact.  Absent: scleral icterus


ENT: hearing intact, clear oral mucosa, dentition normal





- Neck


Neck: Present: supple, normal ROM





- Respiratory


Respiratory effort: normal


Respiratory: bilateral: CTA





- Cardiovascular


Rhythm: regular


Heart Sounds: Present: S1 & S2.  Absent: gallop, systolic murmur, diastolic 

murmur, rub, click





- Extremities


Extremities: no ischemia, pulses intact, pulses symmetrical, No edema, normal 

temperature, Full ROM


Extremity abnormal: edema


Peripheral Pulses: within normal limits





- Abdominal


General gastrointestinal: Present: soft, non-tender, non-distended, normal bowel

sounds.  Absent: mass





- Integumentary


Integumentary: Present: clear, warm, dry, normal turgor.  Absent: rash





- Musculoskeletal


Musculoskeletal: strength equal bilaterally





- Psychiatric


Psychiatric: appropriate mood/affect, intact judgment & insight, memory intact, 

cooperative





- Neurologic


Neurologic: CNII-XII intact, no focal deficits, moves all extremities





Results





- Labs


CBC & Chem 7: 


                                 09/02/22 13:27





                                 09/02/22 13:27


Labs: 


                              Abnormal lab results











  09/02/22 09/02/22 Range/Units





  13:27 13:27 


 


MCH  27 L   (28-32)  pg


 


Lymph % (Auto)  11.9 L   (13.4-35.0)  %


 


Eos % (Auto)  4.9 H   (0.0-4.3)  %


 


Lymph # (Auto)  1.1 L   (1.2-5.4)  K/mm3


 


Seg Neutrophils %  79.1 H   (40.0-70.0)  %


 


Sodium   129 L  (137-145)  mmol/L


 


Potassium   5.3 H  (3.6-5.0)  mmol/L


 


Carbon Dioxide   19 L  (22-30)  mmol/L


 


BUN   66 H  (7-17)  mg/dL


 


Creatinine   6.9 H  (0.6-1.2)  mg/dL


 


Glucose   318 H  ()  mg/dL


 


Calcium   8.2 L  (8.4-10.2)  mg/dL


 


ALT   6 L  (7-56)  units/L


 


Alkaline Phosphatase   131 H  ()  units/L


 


Albumin   3.8 L  (3.9-5)  g/dL














Assessment and Plan





Assessment:





1.  End-stage renal disease-needing dialysis





2.  Diabetes mellitus





3.  Hypertension








Plan:





1.  Patient admitted and placed on telemetry





2.  Consult placed to nephrology for evaluation and recommendations





3.  We will resume routine home medications once reconciled.





4.  Patient placed on sliding scale insulin.  We will monitor Accu-Cheks.








DVT prophylaxis: Sequential compression device





CODE STATUS: Full code
no

## 2022-12-01 ENCOUNTER — OUTPATIENT (OUTPATIENT)
Dept: OUTPATIENT SERVICES | Facility: HOSPITAL | Age: 77
LOS: 1 days | End: 2022-12-01
Payer: MEDICARE

## 2022-12-01 ENCOUNTER — RESULT REVIEW (OUTPATIENT)
Age: 77
End: 2022-12-01

## 2022-12-01 ENCOUNTER — APPOINTMENT (OUTPATIENT)
Age: 77
End: 2022-12-01

## 2022-12-01 DIAGNOSIS — Z90.11 ACQUIRED ABSENCE OF RIGHT BREAST AND NIPPLE: Chronic | ICD-10-CM

## 2022-12-01 DIAGNOSIS — Z90.11 ACQUIRED ABSENCE OF RIGHT BREAST AND NIPPLE: ICD-10-CM

## 2022-12-01 DIAGNOSIS — Z98.890 OTHER SPECIFIED POSTPROCEDURAL STATES: Chronic | ICD-10-CM

## 2022-12-01 DIAGNOSIS — Z90.710 ACQUIRED ABSENCE OF BOTH CERVIX AND UTERUS: Chronic | ICD-10-CM

## 2022-12-01 DIAGNOSIS — C50.911 MALIGNANT NEOPLASM OF UNSPECIFIED SITE OF RIGHT FEMALE BREAST: ICD-10-CM

## 2022-12-01 DIAGNOSIS — Z95.5 PRESENCE OF CORONARY ANGIOPLASTY IMPLANT AND GRAFT: Chronic | ICD-10-CM

## 2022-12-01 PROCEDURE — 76641 ULTRASOUND BREAST COMPLETE: CPT | Mod: 26,LT

## 2022-12-01 PROCEDURE — 76641 ULTRASOUND BREAST COMPLETE: CPT

## 2022-12-01 PROCEDURE — G0279: CPT

## 2022-12-01 PROCEDURE — 77065 DX MAMMO INCL CAD UNI: CPT

## 2022-12-01 PROCEDURE — G0279: CPT | Mod: 26

## 2022-12-01 PROCEDURE — 77065 DX MAMMO INCL CAD UNI: CPT | Mod: 26,LT

## 2022-12-10 ENCOUNTER — NON-APPOINTMENT (OUTPATIENT)
Age: 77
End: 2022-12-10

## 2022-12-13 ENCOUNTER — OUTPATIENT (OUTPATIENT)
Dept: OUTPATIENT SERVICES | Facility: HOSPITAL | Age: 77
LOS: 1 days | Discharge: HOME | End: 2022-12-13

## 2022-12-13 DIAGNOSIS — Z95.5 PRESENCE OF CORONARY ANGIOPLASTY IMPLANT AND GRAFT: Chronic | ICD-10-CM

## 2022-12-13 DIAGNOSIS — Z90.11 ACQUIRED ABSENCE OF RIGHT BREAST AND NIPPLE: Chronic | ICD-10-CM

## 2022-12-13 DIAGNOSIS — Z98.890 OTHER SPECIFIED POSTPROCEDURAL STATES: Chronic | ICD-10-CM

## 2022-12-13 DIAGNOSIS — C50.911 MALIGNANT NEOPLASM OF UNSPECIFIED SITE OF RIGHT FEMALE BREAST: ICD-10-CM

## 2022-12-13 DIAGNOSIS — Z90.710 ACQUIRED ABSENCE OF BOTH CERVIX AND UTERUS: Chronic | ICD-10-CM

## 2022-12-14 ENCOUNTER — RESULT REVIEW (OUTPATIENT)
Age: 77
End: 2022-12-14

## 2022-12-14 ENCOUNTER — APPOINTMENT (OUTPATIENT)
Dept: HEMATOLOGY ONCOLOGY | Facility: CLINIC | Age: 77
End: 2022-12-14

## 2022-12-14 VITALS
HEART RATE: 77 BPM | SYSTOLIC BLOOD PRESSURE: 115 MMHG | RESPIRATION RATE: 17 BRPM | OXYGEN SATURATION: 97 % | TEMPERATURE: 98.4 F | DIASTOLIC BLOOD PRESSURE: 70 MMHG

## 2022-12-14 LAB
BASOPHILS # BLD AUTO: 0.02 K/UL — SIGNIFICANT CHANGE UP (ref 0–0.2)
BASOPHILS NFR BLD AUTO: 0.3 % — SIGNIFICANT CHANGE UP (ref 0–2)
EOSINOPHIL # BLD AUTO: 0.13 K/UL — SIGNIFICANT CHANGE UP (ref 0–0.5)
EOSINOPHIL NFR BLD AUTO: 2 % — SIGNIFICANT CHANGE UP (ref 0–6)
HCT VFR BLD CALC: 39.6 % — SIGNIFICANT CHANGE UP (ref 34.5–45)
HGB BLD-MCNC: 13.4 G/DL — SIGNIFICANT CHANGE UP (ref 11.5–15.5)
IMM GRANULOCYTES NFR BLD AUTO: 0.3 % — SIGNIFICANT CHANGE UP (ref 0–0.9)
LYMPHOCYTES # BLD AUTO: 1.57 K/UL — SIGNIFICANT CHANGE UP (ref 1–3.3)
LYMPHOCYTES # BLD AUTO: 23.8 % — SIGNIFICANT CHANGE UP (ref 13–44)
MCHC RBC-ENTMCNC: 32.2 PG — SIGNIFICANT CHANGE UP (ref 27–34)
MCHC RBC-ENTMCNC: 33.8 GM/DL — SIGNIFICANT CHANGE UP (ref 32–36)
MCV RBC AUTO: 95.2 FL — SIGNIFICANT CHANGE UP (ref 80–100)
MONOCYTES # BLD AUTO: 0.46 K/UL — SIGNIFICANT CHANGE UP (ref 0–0.9)
MONOCYTES NFR BLD AUTO: 7 % — SIGNIFICANT CHANGE UP (ref 2–14)
NEUTROPHILS # BLD AUTO: 4.4 K/UL — SIGNIFICANT CHANGE UP (ref 1.8–7.4)
NEUTROPHILS NFR BLD AUTO: 66.6 % — SIGNIFICANT CHANGE UP (ref 43–77)
NRBC # BLD: 0 /100 WBCS — SIGNIFICANT CHANGE UP (ref 0–0)
PLATELET # BLD AUTO: 252 K/UL — SIGNIFICANT CHANGE UP (ref 150–400)
RBC # BLD: 4.16 M/UL — SIGNIFICANT CHANGE UP (ref 3.8–5.2)
RBC # FLD: 11.9 % — SIGNIFICANT CHANGE UP (ref 10.3–14.5)
WBC # BLD: 6.6 K/UL — SIGNIFICANT CHANGE UP (ref 3.8–10.5)
WBC # FLD AUTO: 6.6 K/UL — SIGNIFICANT CHANGE UP (ref 3.8–10.5)

## 2022-12-14 PROCEDURE — 99214 OFFICE O/P EST MOD 30 MIN: CPT

## 2022-12-14 NOTE — HISTORY OF PRESENT ILLNESS
[Disease: _____________________] : Disease: [unfilled] [T: ___] : T[unfilled] [N: ___] : N[unfilled] [AJCC Stage: ____] : AJCC Stage: [unfilled] [de-identified] : ALVAREZ LÓPEZ is a 76 y.o. with a PMH significant for HLD, NSTEMI 6/2018 -> stent, and lumbar spondylosis, who we are following for bilateral metachronous breast cancers: a remote hx of a left breast cancer in 2010 s/p lumpectomy and XRT, and 20202 ER+, HER2- right sided stage IIB BC, prognostic IIA. \par \par 1/6/21 - Right nipple sparing mastectomy with SLND -> ALND, expander reconstruction.  PATH - IDC x 2 sites.  Largest 2.4cm IDC (6/9) with extensive DCIS, cribriform and solid type, intermediate grade with necrosis. +LVI.  Second area was 1.9cm IDC (6 - 7/9) with focal DCIS, cribriform type with calcifications.  +LVI, +PNI.  \par 1 SLN+ (0.8cm, +IRLANDA), 10 additional LN's negative.  margins negative, but closest margin anterior 0.3cm.\par [id number] = IIB, prognostic IIA.\par \par 2/9/21 - 5/4/21 - Adjuvant chemotherapy with TC x 5.  \par \par 5/12/21 - 5/16/21 - Was admitted to  for SOB, weakness, neutropenia.  Did not have fevers - did not get antibiotics.  \par CT Angio negative for PE. Small right pleural effusion. \par Chemotx stopped - would not tolerate cycle 6.\par \par Tamoxifen started June 2021. ( had hysterectomy;  no AI due to osteopenia/ osteoporosis and dental issues with Prolia) \par  [de-identified] : moderately differentiated infiltrating ductal carcinoma (6/9), no LVI [de-identified] : ER 90%, CA 40%, HER2 negative\par 10/30/20 - INVITAE - negative [de-identified] : Feels great . No complaints. Had completion of breast reconstruction ( implant ) last year. \par Left breast mammo/ sono in Dec 2022 -OK

## 2022-12-14 NOTE — ASSESSMENT
[FreeTextEntry1] : 76 y/o female  with bilateral metachronous breast cancers\par Remote hx of a left breast cancer in 2010 s/p lumpectomy and XRT\par 2021 ER+, HER2- right  sided BC s/p right nipple sparing mastectomy with expander reconstruction for stage IIB, prognostic IIA disease.\par \par Adjuvant chemotherapy TC x 5  2/9/21 -  5/4/21 ( cycle 6 th omitted due to toxicity) \par \par On  TAmoxifen since  June 2021. Tolerating well. Plan for total 10 years of adjuvant hormonal therapy.\par \par Clinically WALTER.\par \par L breast mammo/ sono Dec 2023.\par \par Return visit in 6  months

## 2022-12-14 NOTE — PHYSICAL EXAM
[Normal] : no JVD, no calf tenderness, venous stasis changes, varices [de-identified] : looks well  [de-identified] : Right mastectomy with implant , left breast s/p lumpectomy, no palpable masses, no axillary adenopathy b/l

## 2022-12-14 NOTE — REVIEW OF SYSTEMS
[Patient Intake Form Reviewed] : Patient intake form was reviewed [Negative] : Allergic/Immunologic [Fatigue] : no fatigue [Shortness Of Breath] : no shortness of breath [Muscle Weakness] : no muscle weakness

## 2022-12-15 DIAGNOSIS — Z51.81 ENCOUNTER FOR THERAPEUTIC DRUG LEVEL MONITORING: ICD-10-CM

## 2022-12-15 DIAGNOSIS — C50.811 MALIGNANT NEOPLASM OF OVERLAPPING SITES OF RIGHT FEMALE BREAST: ICD-10-CM

## 2022-12-15 LAB
24R-OH-CALCIDIOL SERPL-MCNC: 50.2 NG/ML — SIGNIFICANT CHANGE UP (ref 30–80)
ALBUMIN SERPL ELPH-MCNC: 4.4 G/DL — SIGNIFICANT CHANGE UP (ref 3.3–5)
ALP SERPL-CCNC: 50 U/L — SIGNIFICANT CHANGE UP (ref 40–120)
ALT FLD-CCNC: 14 U/L — SIGNIFICANT CHANGE UP (ref 10–45)
ANION GAP SERPL CALC-SCNC: 14 MMOL/L — SIGNIFICANT CHANGE UP (ref 5–17)
AST SERPL-CCNC: 21 U/L — SIGNIFICANT CHANGE UP (ref 10–40)
BILIRUB SERPL-MCNC: 0.2 MG/DL — SIGNIFICANT CHANGE UP (ref 0.2–1.2)
BUN SERPL-MCNC: 21 MG/DL — SIGNIFICANT CHANGE UP (ref 7–23)
CALCIUM SERPL-MCNC: 9.7 MG/DL — SIGNIFICANT CHANGE UP (ref 8.4–10.5)
CHLORIDE SERPL-SCNC: 104 MMOL/L — SIGNIFICANT CHANGE UP (ref 96–108)
CO2 SERPL-SCNC: 22 MMOL/L — SIGNIFICANT CHANGE UP (ref 22–31)
CREAT SERPL-MCNC: 1.16 MG/DL — SIGNIFICANT CHANGE UP (ref 0.5–1.3)
EGFR: 49 ML/MIN/1.73M2 — LOW
GLUCOSE SERPL-MCNC: 95 MG/DL — SIGNIFICANT CHANGE UP (ref 70–99)
POTASSIUM SERPL-MCNC: 4.9 MMOL/L — SIGNIFICANT CHANGE UP (ref 3.5–5.3)
POTASSIUM SERPL-SCNC: 4.9 MMOL/L — SIGNIFICANT CHANGE UP (ref 3.5–5.3)
PROT SERPL-MCNC: 6.8 G/DL — SIGNIFICANT CHANGE UP (ref 6–8.3)
SODIUM SERPL-SCNC: 141 MMOL/L — SIGNIFICANT CHANGE UP (ref 135–145)

## 2023-02-06 NOTE — INPATIENT CERTIFICATION FOR MEDICARE PATIENTS - RISKS OF ADVERSE EVENTS
Patient claling, she has appt on 2/8/23 with Dr Lyles, but she has been exposed to others with Covid, so far she is negative, but not sre if she should come to her appt, can nurse call her back?   Other:/suicidality

## 2023-02-18 NOTE — INPATIENT CERTIFICATION FOR MEDICARE PATIENTS - RISKS OF ADVERSE EVENTS
Concern for delay in diagnosis and treatment
I, Emerson Faustin, performed a history and physical exam of the patient and discussed their management with the resident and/or advanced care provider. I reviewed the resident and/or advanced care provider's note and agree with the documented findings and plan of care except where noted. I was present and available for all procedures.     Emerson Faustin MD. 38 yo F PMHx htn, GERD, presenting with epigastric/luq abd pain x2 weeks, worse w/ meals and after drinking coffee. pt feels gassy and bloated after eating. pain relieved after passign flatus and BMs. feels nauseous at times but denies vomiting, diarrhea, constipation, fever, cough, cp, sob. has not had an endoscopy before. denies dysuria.     PHYSICAL EXAM:  GENERAL: non-toxic appearing; in no respiratory distress  HEAD Atraumatic, Normocephalic  NECK: No JVD; trachea midline  EYES: PERRL, EOMs intact b/l w/out deficits; normal conjunctiva  CHEST/LUNG: CTAB no wheezes/rhonchi/rales  HEART: RRR no murmur/gallops/rubs  ABDOMEN: soft, NT, ND  EXTREMITIES: No LE edema, +2 radial pulses b/l, +2 DP/PT pulses b/l  MUSCULOSKELETAL: FROM of all 4 extremities;  NERVOUS SYSTEM:  A&Ox3, No motor deficits or sensory deficits; CNII-XII intact; Speech is fluent and appropriate  SKIN:  Warm and dry as visualized     MDM: pt presenting with 2 weeks of post prandial epigastric/luq abd pain without vomiting. pain relieved with bm and passing flatus. pt well appaering. hd stable. abd soft, non tender at this time. no epigastric, luq, or ruq ttp. no lower abd ttp. lungs cta. rrr nl s1/s2. likely gastritis vs pud. low susipcion for pancreatitis or biliary colic at this time. low susipcion for acs. Will check CBC to eval WBC, anemia, plt count; CMP to eval for lyte abnormalities, renal and/or liver dysfunction. lipase to eval for pancreatitis. no indication for abd imaging as pt is non tender and has no abd pain at this time. will provide gi cocktail, raessess. anticipate dc pending ed w/u

## 2023-03-03 NOTE — ED BEHAVIORAL HEALTH ASSESSMENT NOTE - CURRENT PLAN
PATIENT NAME: Jeannie Thomas  PATIENT MRN: [id number]  DATE OF SURGERY: 3/3/2023    TITLE OF PROCEDURE:  1) Sacroiliac Injection       2) Fluoroscopic Guidance      SIDE: Bilateral    PREOPERATIVE DIAGNOSES: Sacroiliac Joint Dysfunction    POSTOPERATIVE DIAGNOSES:  Same    LOCATION: [facility]    SURGEON: Farooq    ANESTHESIA:  Local    DESCRIPTION OF OPERATIVE PROCEDURE:  Informed consent was obtained. An IV was not placed. The patient was brought to the Procedure Room and he/she positioned themselves to their comfort and optimal positioning for procedure.  Time-out was conducted with all members of the care team.  Standard ASA monitors were placed. Standard prep and drape were performed.    Fluoroscopic views were obtained of the sacroiliac joint. The area overlying the joint was identified with contralateral oblique fluoroscopy and marked with a sterile marking pen. Next, a 25-gauge 3.5 inch spinal needle was then guided into the sacroiliac joint under fluoroscopic guidance. 0.5 cc of Isovue 200 contrast dye was injected.  Good dye spread was noted in both AP and lateral projections without evidence of intraneural, intrathecal, or intravascular injection. After a negative aspiration, 1 mL of 0.25% bupivicaine and 20 mg of methylprednisolone was incrementally injected into the joint space, and the needle was then flushed with normal saline and removed. The procedure was repeated on the opposite side.     The surgical site preparation was washed off of the patient. Band-Aids were applied. The patient was brought to the recovery area.  The patient did very well and the procedure results were discussed.  Standard discharge instructions were given to the patient.  The patient knows how to contact the clinic should they have any questions or problems.      COMPLICATIONS: None    EBL: minimal    URINE OUTPUT: not monitored    FLUIDS: None    SPECIMENS: None    IMPLANTS: None    PLAN: Return to clinic for followup in 2  weeks.       None known

## 2023-04-10 RX ORDER — UREA 450 MG/ML
45 GEL TOPICAL TWICE DAILY
Qty: 1 | Refills: 1 | Status: DISCONTINUED | COMMUNITY
Start: 2021-06-04 | End: 2023-04-10

## 2023-04-10 RX ORDER — OXYCODONE AND ACETAMINOPHEN 5; 325 MG/1; MG/1
5-325 TABLET ORAL
Qty: 20 | Refills: 0 | Status: DISCONTINUED | COMMUNITY
Start: 2021-09-30 | End: 2023-04-10

## 2023-04-10 RX ORDER — MONTELUKAST 10 MG/1
10 TABLET, FILM COATED ORAL
Qty: 10 | Refills: 0 | Status: DISCONTINUED | COMMUNITY
Start: 2021-01-27 | End: 2023-04-10

## 2023-05-23 ENCOUNTER — OUTPATIENT (OUTPATIENT)
Dept: OUTPATIENT SERVICES | Facility: HOSPITAL | Age: 78
LOS: 1 days | Discharge: HOME | End: 2023-05-23

## 2023-05-23 DIAGNOSIS — C50.911 MALIGNANT NEOPLASM OF UNSPECIFIED SITE OF RIGHT FEMALE BREAST: ICD-10-CM

## 2023-05-23 DIAGNOSIS — Z90.710 ACQUIRED ABSENCE OF BOTH CERVIX AND UTERUS: Chronic | ICD-10-CM

## 2023-05-23 DIAGNOSIS — Z95.5 PRESENCE OF CORONARY ANGIOPLASTY IMPLANT AND GRAFT: Chronic | ICD-10-CM

## 2023-05-23 DIAGNOSIS — Z90.11 ACQUIRED ABSENCE OF RIGHT BREAST AND NIPPLE: Chronic | ICD-10-CM

## 2023-05-23 DIAGNOSIS — Z98.890 OTHER SPECIFIED POSTPROCEDURAL STATES: Chronic | ICD-10-CM

## 2023-05-23 NOTE — H&P PST ADULT - NSICDXPROBLEM_GEN_ALL_CORE_FT
[address] [facility] PRIMARY CARE  [address] [address]  Dept: [phone]  Dept Fax: : [phone]     Chief Complaint  Chief Complaint   Patient presents with    Congestion     SX x 1 week. Has tried Tylenol Cold and Flu OTC without relief. Cough    Pharyngitis    Discuss Medications     Zoloft. Patient leaves out of town at monthly increments and is concerned she will be out of town when she is due to  her medication next month. Insomnia     Elavil not working for patient. Patient is asking for Ambien        HPI:  63 y. o.female who presents for the following:      URI symptoms: x1.5 week with cough, nasal congestion, wakes with sore throat sometimes; no n/v. No fevers; taking otc tylenol with little relief    Mood: zoloft helps; would like to continue it; seeing Dr. Kelsey Mccain for counseling    Insomnia: poor sleep; feels the elavil isn't helping; wants ambien; hydroxyzine was helping in the past    Review of Systems   Constitutional:  Negative for chills and fever. HENT:  Positive for congestion and sore throat. Negative for rhinorrhea. Respiratory:  Positive for cough. Negative for shortness of breath and wheezing. Gastrointestinal:  Negative for abdominal pain, constipation and diarrhea. Endocrine: Negative for polydipsia and polyuria. Genitourinary:  Negative for dysuria, frequency and urgency. Neurological:  Negative for syncope, light-headedness, numbness and headaches. Psychiatric/Behavioral:  Positive for sleep disturbance. The patient is not nervous/anxious.       Past Medical History:   Diagnosis Date    Depression     Insomnia      Past Surgical History:   Procedure Laterality Date    OTHER SURGICAL HISTORY  1991    gallbladder removal      Social History     Socioeconomic History    Marital status:      Spouse name: Not on file    Number of children: Not on file    Years
PROBLEM DIAGNOSES  Problem: Need for prophylactic measure  Assessment and Plan: The Caprini score indicates this patient is at risk for a VTE event (score 3-5).  Most surgical patients in this group would benefit from pharmacologic prophylaxis.  The surgical team will determine the balance between VTE risk and bleeding risk

## 2023-06-06 ENCOUNTER — APPOINTMENT (OUTPATIENT)
Dept: HEMATOLOGY ONCOLOGY | Facility: CLINIC | Age: 78
End: 2023-06-06

## 2023-06-06 NOTE — PHYSICAL EXAM
[Normal] : affect appropriate [de-identified] : looks well  [de-identified] : Right mastectomy with implant , left breast s/p lumpectomy, no palpable masses, no axillary adenopathy b/l

## 2023-06-06 NOTE — REVIEW OF SYSTEMS
[Patient Intake Form Reviewed] : Patient intake form was reviewed [Fatigue] : no fatigue [Shortness Of Breath] : no shortness of breath [Muscle Weakness] : no muscle weakness [Negative] : Allergic/Immunologic

## 2023-06-06 NOTE — HISTORY OF PRESENT ILLNESS
[Disease: _____________________] : Disease: [unfilled] [T: ___] : T[unfilled] [N: ___] : N[unfilled] [AJCC Stage: ____] : AJCC Stage: [unfilled] [de-identified] : ALVAREZ LÓPEZ is a 76 y.o. with a PMH significant for HLD, NSTEMI 6/2018 -> stent, and lumbar spondylosis, who we are following for bilateral metachronous breast cancers: a remote hx of a left breast cancer in 2010 s/p lumpectomy and XRT, and 20202 ER+, HER2- right sided stage IIB BC, prognostic IIA. \par \par 1/6/21 - Right nipple sparing mastectomy with SLND -> ALND, expander reconstruction.  PATH - IDC x 2 sites.  Largest 2.4cm IDC (6/9) with extensive DCIS, cribriform and solid type, intermediate grade with necrosis. +LVI.  Second area was 1.9cm IDC (6 - 7/9) with focal DCIS, cribriform type with calcifications.  +LVI, +PNI.  \par 1 SLN+ (0.8cm, +IRLANDA), 10 additional LN's negative.  margins negative, but closest margin anterior 0.3cm.\par [id number] = IIB, prognostic IIA.\par \par 2/9/21 - 5/4/21 - Adjuvant chemotherapy with TC x 5.  \par \par 5/12/21 - 5/16/21 - Was admitted to  for SOB, weakness, neutropenia.  Did not have fevers - did not get antibiotics.  \par CT Angio negative for PE. Small right pleural effusion. \par Chemotx stopped - would not tolerate cycle 6.\par \par Tamoxifen started June 2021. ( had hysterectomy;  no AI due to osteopenia/ osteoporosis and dental issues with Prolia) \par  [de-identified] : moderately differentiated infiltrating ductal carcinoma (6/9), no LVI [de-identified] : ER 90%, IA 40%, HER2 negative\par 10/30/20 - INVITAE - negative [de-identified] : Feels great . No complaints. Had completion of breast reconstruction ( implant ) last year. \par Left breast mammo/ sono in Dec 2022 -OK

## 2023-06-06 NOTE — ASSESSMENT
[FreeTextEntry1] : 78 y/o female  with bilateral metachronous breast cancers\par Remote hx of a left breast cancer in 2010 s/p lumpectomy and XRT\par 2021 ER+, HER2- right  sided BC s/p right nipple sparing mastectomy with expander reconstruction for stage IIB, prognostic IIA disease.\par \par Adjuvant chemotherapy TC x 5  2/9/21 -  5/4/21 ( cycle 6 th omitted due to toxicity) \par \par On  TAmoxifen since  June 2021. Tolerating well. Plan for total 10 years of adjuvant hormonal therapy.\par \par Clinically [facility]AL[facility].\par \par L breast mammo/ sono Dec 2023.\par \par Return visit in 6  months

## 2023-06-28 ENCOUNTER — RESULT REVIEW (OUTPATIENT)
Age: 78
End: 2023-06-28

## 2023-06-28 ENCOUNTER — APPOINTMENT (OUTPATIENT)
Dept: HEMATOLOGY ONCOLOGY | Facility: CLINIC | Age: 78
End: 2023-06-28
Payer: MEDICARE

## 2023-06-28 DIAGNOSIS — Z51.81 ENCOUNTER FOR THERAPEUTIC DRUG LEVEL MONITORING: ICD-10-CM

## 2023-06-28 DIAGNOSIS — C50.811 MALIGNANT NEOPLASM OF OVERLAPPING SITES OF RIGHT FEMALE BREAST: ICD-10-CM

## 2023-06-28 LAB
ALBUMIN SERPL ELPH-MCNC: 4.7 G/DL — SIGNIFICANT CHANGE UP (ref 3.3–5)
ALP SERPL-CCNC: 56 U/L — SIGNIFICANT CHANGE UP (ref 40–120)
ALT FLD-CCNC: 18 U/L — SIGNIFICANT CHANGE UP (ref 10–45)
ANION GAP SERPL CALC-SCNC: 10 MMOL/L — SIGNIFICANT CHANGE UP (ref 5–17)
AST SERPL-CCNC: 27 U/L — SIGNIFICANT CHANGE UP (ref 10–40)
BASOPHILS # BLD AUTO: 0.05 K/UL — SIGNIFICANT CHANGE UP (ref 0–0.2)
BASOPHILS NFR BLD AUTO: 0.7 % — SIGNIFICANT CHANGE UP (ref 0–2)
BILIRUB SERPL-MCNC: 0.3 MG/DL — SIGNIFICANT CHANGE UP (ref 0.2–1.2)
BUN SERPL-MCNC: 20 MG/DL — SIGNIFICANT CHANGE UP (ref 7–23)
CALCIUM SERPL-MCNC: 9.5 MG/DL — SIGNIFICANT CHANGE UP (ref 8.4–10.5)
CHLORIDE SERPL-SCNC: 102 MMOL/L — SIGNIFICANT CHANGE UP (ref 96–108)
CO2 SERPL-SCNC: 28 MMOL/L — SIGNIFICANT CHANGE UP (ref 22–31)
CREAT SERPL-MCNC: 1.34 MG/DL — HIGH (ref 0.5–1.3)
EGFR: 41 ML/MIN/1.73M2 — LOW
EOSINOPHIL # BLD AUTO: 0.18 K/UL — SIGNIFICANT CHANGE UP (ref 0–0.5)
EOSINOPHIL NFR BLD AUTO: 2.5 % — SIGNIFICANT CHANGE UP (ref 0–6)
GLUCOSE SERPL-MCNC: 98 MG/DL — SIGNIFICANT CHANGE UP (ref 70–99)
HCT VFR BLD CALC: 42.6 % — SIGNIFICANT CHANGE UP (ref 34.5–45)
HGB BLD-MCNC: 13.9 G/DL — SIGNIFICANT CHANGE UP (ref 11.5–15.5)
IMM GRANULOCYTES NFR BLD AUTO: 0.3 % — SIGNIFICANT CHANGE UP (ref 0–0.9)
LYMPHOCYTES # BLD AUTO: 1.48 K/UL — SIGNIFICANT CHANGE UP (ref 1–3.3)
LYMPHOCYTES # BLD AUTO: 20.6 % — SIGNIFICANT CHANGE UP (ref 13–44)
MCHC RBC-ENTMCNC: 32 PG — SIGNIFICANT CHANGE UP (ref 27–34)
MCHC RBC-ENTMCNC: 32.6 GM/DL — SIGNIFICANT CHANGE UP (ref 32–36)
MCV RBC AUTO: 98.2 FL — SIGNIFICANT CHANGE UP (ref 80–100)
MONOCYTES # BLD AUTO: 0.49 K/UL — SIGNIFICANT CHANGE UP (ref 0–0.9)
MONOCYTES NFR BLD AUTO: 6.8 % — SIGNIFICANT CHANGE UP (ref 2–14)
NEUTROPHILS # BLD AUTO: 4.97 K/UL — SIGNIFICANT CHANGE UP (ref 1.8–7.4)
NEUTROPHILS NFR BLD AUTO: 69.1 % — SIGNIFICANT CHANGE UP (ref 43–77)
NRBC # BLD: 0 /100 WBCS — SIGNIFICANT CHANGE UP (ref 0–0)
PLATELET # BLD AUTO: 258 K/UL — SIGNIFICANT CHANGE UP (ref 150–400)
POTASSIUM SERPL-MCNC: 4.8 MMOL/L — SIGNIFICANT CHANGE UP (ref 3.5–5.3)
POTASSIUM SERPL-SCNC: 4.8 MMOL/L — SIGNIFICANT CHANGE UP (ref 3.5–5.3)
PROT SERPL-MCNC: 7 G/DL — SIGNIFICANT CHANGE UP (ref 6–8.3)
RBC # BLD: 4.34 M/UL — SIGNIFICANT CHANGE UP (ref 3.8–5.2)
RBC # FLD: 12.3 % — SIGNIFICANT CHANGE UP (ref 10.3–14.5)
SODIUM SERPL-SCNC: 140 MMOL/L — SIGNIFICANT CHANGE UP (ref 135–145)
WBC # BLD: 7.19 K/UL — SIGNIFICANT CHANGE UP (ref 3.8–10.5)
WBC # FLD AUTO: 7.19 K/UL — SIGNIFICANT CHANGE UP (ref 3.8–10.5)

## 2023-06-28 PROCEDURE — 99214 OFFICE O/P EST MOD 30 MIN: CPT

## 2023-06-28 NOTE — PHYSICAL EXAM
[Normal] : affect appropriate [de-identified] : looks well  [de-identified] : Right mastectomy with implant - very fibrotic and slightly nodular scar , left breast s/p lumpectomy, no palpable masses, no axillary adenopathy b/l

## 2023-06-28 NOTE — HISTORY OF PRESENT ILLNESS
[Disease: _____________________] : Disease: [unfilled] [T: ___] : T[unfilled] [N: ___] : N[unfilled] [AJCC Stage: ____] : AJCC Stage: [unfilled] [de-identified] : ALVAREZ LÓPEZ is a 76 y.o. with a PMH significant for HLD, NSTEMI 6/2018 -> stent, and lumbar spondylosis, who we are following for bilateral metachronous breast cancers: a remote hx of a left breast cancer in 2010 s/p lumpectomy and XRT, and 20202 ER+, HER2- right sided stage IIB BC, prognostic IIA. \par \par 1/6/21 - Right nipple sparing mastectomy with SLND -> ALND, expander reconstruction.  PATH - IDC x 2 sites.  Largest 2.4cm IDC (6/9) with extensive DCIS, cribriform and solid type, intermediate grade with necrosis. +LVI.  Second area was 1.9cm IDC (6 - 7/9) with focal DCIS, cribriform type with calcifications.  +LVI, +PNI.  \par 1 SLN+ (0.8cm, +IRLANDA), 10 additional LN's negative.  margins negative, but closest margin anterior 0.3cm.\par [id number] = IIB, prognostic IIA.\par \par 2/9/21 - 5/4/21 - Adjuvant chemotherapy with TC x 5.  \par \par 5/12/21 - 5/16/21 - Was admitted to  for SOB, weakness, neutropenia.  Did not have fevers - did not get antibiotics.  \par CT Angio negative for PE. Small right pleural effusion. \par Chemotx stopped - would not tolerate cycle 6.\par \par Tamoxifen started June 2021. ( had hysterectomy;  no AI due to osteopenia/ osteoporosis and dental issues with Prolia) \par \par Had completion of breast reconstruction ( implant ) 2021.\par  [de-identified] : moderately differentiated infiltrating ductal carcinoma (6/9), no LVI [de-identified] : ER 90%, NC 40%, HER2 negative\par 10/30/20 - INVITAE - negative [de-identified] : Feels well.  No complaints.  \par Left breast mammo/ sono in Dec 2022 -OK

## 2023-06-28 NOTE — ASSESSMENT
[FreeTextEntry1] : 79 y/o female  with bilateral metachronous breast cancers\par Remote hx of a left breast cancer in 2010 s/p lumpectomy and XRT\par 2021 ER+, HER2- right  sided BC s/p right nipple sparing mastectomy with expander reconstruction for stage IIB, prognostic IIA disease.\par \par Adjuvant chemotherapy  ( Dr Lanier) TC x 5  2/9/21  -  5/4/21 ( cycle 6 th omitted due to toxicity) \par \par On  TAmoxifen since  June 2021. Tolerating well. Plan for total 10 years of adjuvant hormonal therapy.\par \par L breast mammo due  Dec 2023.\par \par Right breast - very fibrotic with some nodularity.\par Needs to follow up with surgery- she will schedule an appointment  with Dr Mantilla  ( called breast surgery office) \par \par Return visit in 6  months / sooner PRN.

## 2023-07-11 ENCOUNTER — APPOINTMENT (OUTPATIENT)
Dept: BREAST CENTER | Facility: CLINIC | Age: 78
End: 2023-07-11
Payer: MEDICARE

## 2023-07-11 VITALS
SYSTOLIC BLOOD PRESSURE: 119 MMHG | WEIGHT: 135 LBS | DIASTOLIC BLOOD PRESSURE: 71 MMHG | HEIGHT: 61 IN | HEART RATE: 67 BPM | BODY MASS INDEX: 25.49 KG/M2

## 2023-07-11 DIAGNOSIS — N63.0 UNSPECIFIED LUMP IN UNSPECIFIED BREAST: ICD-10-CM

## 2023-07-11 PROCEDURE — 99214 OFFICE O/P EST MOD 30 MIN: CPT

## 2023-07-11 NOTE — CONSULT LETTER
[Dear  ___] : Dear  [unfilled], [Courtesy Letter:] : I had the pleasure of seeing your patient, [unfilled], in my office today. [Please see my note below.] : Please see my note below. [Consult Closing:] : Thank you very much for allowing me to participate in the care of this patient.  If you have any questions, please do not hesitate to contact me. [Sincerely,] : Sincerely, [FreeTextEntry3] : Aida Mantilla MD FACS

## 2023-07-11 NOTE — HISTORY OF PRESENT ILLNESS
[FreeTextEntry1] : Ms. Dotson is a 78 year old woman here for a follow-up for surveillance for breast cancer. Her breast history is significant for\par \par 1.) L breast DCIS diagnosed in 2001 at age 56, pathologic stage 0, pTis\par - s/p L lumpectomy (Dr. Smallwood, 8/2001) = DCIS, high grade\par - s/p radiation\par \par 2.) Right multicentric infiltrating ductal carcinoma diagnosed in 2020 at age 75, pathologic stage II, pT2 pN1, ER 90%, MS 40%, Her2 negative\par - s/p R nipple sparing mastectomy, axillary node dissection (1/6/2021, Dr. Smallwood) with implant based reconstruction (Dr. Rueda) = 2.4 cm and 1.9 cm tumors, 0/1 sln with 0.8 cm tumor and FAY, 0/10 additional LN\par - s/p subsequent L mastopexy\par - s/p chemotherapy (Dr. Colunga, TC x 5)\par - on tamoxifen\par \par She has no breast complaints.\par \par Her genetic panel testing is negative. \par

## 2023-07-11 NOTE — PHYSICAL EXAM
[Normocephalic] : normocephalic [Sclera nonicteric] : sclera nonicteric [Supple] : supple [No Supraclavicular Adenopathy] : no supraclavicular adenopathy [No Cervical Adenopathy] : no cervical adenopathy [Clear to Auscultation Bilat] : clear to auscultation bilaterally [Normal Sinus Rhythm] : normal sinus rhythm [Examined in the supine and seated position] : examined in the supine and seated position [Asymmetrical] : asymmetrical [Grade 2] : Ptosis Grade 2 [No dominant masses] : no dominant masses in right breast  [No dominant masses] : no dominant masses left breast [No Nipple Retraction] : no left nipple retraction [No Nipple Discharge] : no left nipple discharge [No Axillary Lymphadenopathy] : no left axillary lymphadenopathy [No Edema] : no edema [No Rashes] : no rashes [No Ulceration] : no ulceration [Soft] : abdomen soft [de-identified] : Superior periareolar scar is retracted down. Implant. Swelling in the reconstructed breast [de-identified] : Curvilinear scar at 1-2:00 with mild volume loss. Irwin pattern scar

## 2023-07-11 NOTE — DATA REVIEWED
[FreeTextEntry1] : US guided core biopsy right breast (7:00 N6 winged shaped clip) : 10/27/20  PATHOLOGY:  Infiltrating ductal carcinoma, moderately differentiated. No LVI.  ER - - 90%,  SD -40%, HER2 - negative. \par \par Ultrasound guided core biopsy Right breast 7:00 N4: 11/19/20   Pathology: Infiltrating Ductal carcinoma. ( 6/9). ER - 95%, SD - 60%, HER2 - negative. \par \par SURGICAL PATHOLOGY:  1/6/21  Results:  1) Right breast - retronipple tissue - negative.  Infiltrating ductal carcinoma, 2 sites measuring 2.4 cm and 1.9 cm. Payson score 6/9.  Margins negative.  \par [place] node # 1 - 8 mm metastatic focus with extranodal extension.  Axillary lymphadenectomy - 10 negative nodes.    ER and SD positive, HER 2 negative ( for both tumors).\par \par I have independently reviewed the reports and the images. \par \par L mammogram and US 12/1/22\par - scattered fibroglandular density\par - BIRADS 2

## 2023-07-21 NOTE — H&P PST ADULT - SURGICAL SITE INCISION
[facility] - Post-procedure Instructions          --  While there are no absolute restrictions, it is recommended that you do not perform strenuous activity today. In the morning, you may resume your level of activity as before your block.    --  If you have a band-aid at your injection site, please remove it later today. Observe the area for any redness, swelling, pus-like drainage, or a temperature over 101°. If any of these symptoms occur, please call your doctor at [phone]. If after office hours, leave a message and the on-call provider will return your call.    --  Ice may be applied to your injection site. It is recommended you avoid direct heat (heating pad; hot tub) for 1-2 days.    --  Call [facility] at [phone] if you experience persistent headache, persistent bleeding from the injection site, or severe pain not relieved by heat or oral medication.    --  Do not make important decisions today.    --  Due to the effects of the block and/or the I.V. Sedation, DO NOT drive or operate hazardous machinery for 12 hours.  Local anesthetics may cause numbness after procedure and precautions must be taken with regards to operating equipment as well as with walking, even if ambulating with assistance of another person or with an assistive device.    --  Do not drink alcohol for 12 hours.    -- You may return to work tomorrow, or as directed by your referring doctor.    --  Occasionally you may notice a slight increase in your pain after the procedure. This should start to improve within the next 24-48 hours. Radiofrequency ablation procedure pain may last 3-4 weeks.    --  It may take as long as 3-4 days before you notice a gradual improvement in your pain and/or other symptoms.    -- You may continue to take your prescribed pain medication as needed.    --  Some normal possible side effects of steroid use could include fluid retention, increased blood sugar, dull headache,  increased sweating, increased appetite, mood swings and flushing.    --  Diabetics are recommended to watch their blood glucose level closely for 24-48 hours after the injection.    --  Must stay in PACU for 20 min upon arrival and prove no leg weakness before being discharged.    --  IN THE EVENT OF A LIFE THREATENING EMERGENCY, (CHEST PAIN, BREATHING DIFFICULTIES, PARALYSIS…) YOU SHOULD GO TO YOUR NEAREST EMERGENCY ROOM.    --  You should be contacted by our office within 2-3 days to schedule follow up or next appointment date.  If not contacted within 7 days, please call the office at [phone]     If you have even 1 hour of relief, these injections are considered successful.    no

## 2023-08-11 ENCOUNTER — RESULT REVIEW (OUTPATIENT)
Age: 78
End: 2023-08-11

## 2023-08-11 ENCOUNTER — OUTPATIENT (OUTPATIENT)
Dept: OUTPATIENT SERVICES | Facility: HOSPITAL | Age: 78
LOS: 1 days | End: 2023-08-11
Payer: MEDICARE

## 2023-08-11 ENCOUNTER — APPOINTMENT (OUTPATIENT)
Dept: ULTRASOUND IMAGING | Facility: CLINIC | Age: 78
End: 2023-08-11
Payer: MEDICARE

## 2023-08-11 DIAGNOSIS — Z95.5 PRESENCE OF CORONARY ANGIOPLASTY IMPLANT AND GRAFT: Chronic | ICD-10-CM

## 2023-08-11 DIAGNOSIS — Z90.710 ACQUIRED ABSENCE OF BOTH CERVIX AND UTERUS: Chronic | ICD-10-CM

## 2023-08-11 DIAGNOSIS — Z98.890 OTHER SPECIFIED POSTPROCEDURAL STATES: Chronic | ICD-10-CM

## 2023-08-11 DIAGNOSIS — N63.0 UNSPECIFIED LUMP IN UNSPECIFIED BREAST: ICD-10-CM

## 2023-08-11 DIAGNOSIS — Z90.11 ACQUIRED ABSENCE OF RIGHT BREAST AND NIPPLE: Chronic | ICD-10-CM

## 2023-08-11 PROCEDURE — 76642 ULTRASOUND BREAST LIMITED: CPT | Mod: 26,RT

## 2023-08-11 PROCEDURE — 76642 ULTRASOUND BREAST LIMITED: CPT

## 2023-08-24 ENCOUNTER — APPOINTMENT (OUTPATIENT)
Dept: ULTRASOUND IMAGING | Facility: CLINIC | Age: 78
End: 2023-08-24

## 2023-08-30 ENCOUNTER — APPOINTMENT (OUTPATIENT)
Dept: NEUROLOGY | Facility: CLINIC | Age: 78
End: 2023-08-30
Payer: MEDICARE

## 2023-08-30 VITALS
BODY MASS INDEX: 25.49 KG/M2 | WEIGHT: 135 LBS | SYSTOLIC BLOOD PRESSURE: 148 MMHG | HEART RATE: 64 BPM | TEMPERATURE: 98.2 F | HEIGHT: 61 IN | DIASTOLIC BLOOD PRESSURE: 80 MMHG

## 2023-08-30 DIAGNOSIS — R41.3 OTHER AMNESIA: ICD-10-CM

## 2023-08-30 PROCEDURE — 99204 OFFICE O/P NEW MOD 45 MIN: CPT

## 2023-08-30 RX ORDER — OMEPRAZOLE 40 MG/1
40 CAPSULE, DELAYED RELEASE ORAL
Qty: 90 | Refills: 3 | Status: DISCONTINUED | COMMUNITY
Start: 2021-03-26 | End: 2023-08-30

## 2023-08-30 RX ORDER — FUROSEMIDE 20 MG/1
20 TABLET ORAL
Qty: 30 | Refills: 0 | Status: DISCONTINUED | COMMUNITY
Start: 2021-05-19 | End: 2023-08-30

## 2023-08-30 NOTE — PHYSICAL EXAM
[General Appearance - Alert] : alert [General Appearance - In No Acute Distress] : in no acute distress [Oriented To Time, Place, And Person] : oriented to person, place, and time [Impaired Insight] : insight and judgment were intact [Affect] : the affect was normal [Total Score ___ / 30] : the patient achieved a score of [unfilled] /30 [Date / Time ___ / 5] : date / time [unfilled] / 5 [Place ___ / 5] : place [unfilled] / 5 [Registration ___ / 3] : registration [unfilled] / 3 [Serial Sevens ___/5] : serial sevens [unfilled] / 5 [Naming 2 Objects ___ / 2] : naming two objects [unfilled] / 2 [Repeating a Sentence ___ / 1] : repeating a sentence [unfilled] / 1 [Writing a Sentence ___ / 1] : write sentence [unfilled] / 1 [3-stage Verbal Command ___ / 3] : three-stage verbal command [unfilled] / 3 [Written Command ___ / 1] : written command [unfilled] / 1 [Copy a Design ___ / 1] : copy a design [unfilled] / 1 [Recall ___ / 3] : recall [unfilled] / 3 [Cranial Nerves Optic (II)] : visual acuity intact bilaterally,  visual fields full to confrontation, pupils equal round and reactive to light [Cranial Nerves Oculomotor (III)] : extraocular motion intact [Cranial Nerves Trigeminal (V)] : facial sensation intact symmetrically [Cranial Nerves Facial (VII)] : face symmetrical [Cranial Nerves Vestibulocochlear (VIII)] : hearing was intact bilaterally [Cranial Nerves Glossopharyngeal (IX)] : tongue and palate midline [Cranial Nerves Accessory (XI - Cranial And Spinal)] : head turning and shoulder shrug symmetric [Motor Tone] : muscle tone was normal in all four extremities [Cranial Nerves Hypoglossal (XII)] : there was no tongue deviation with protrusion [Motor Strength] : muscle strength was normal in all four extremities [No Muscle Atrophy] : normal bulk in all four extremities [Motor Handedness Right-Handed] : the patient is right hand dominant [Paresis Pronator Drift Right-Sided] : no pronator drift on the right [Paresis Pronator Drift Left-Sided] : no pronator drift on the left [Sensation Tactile Decrease] : light touch was intact [Romberg's Sign] : Romberg's sign was negtive [Abnormal Walk] : normal gait [Balance] : balance was intact [Past-pointing] : there was no past-pointing [Tremor] : no tremor present [Dysdiadochokinesia Bilaterally] : not present [Coordination - Dysmetria Impaired Finger-to-Nose Bilateral] : not present [Coordination - Dysmetria Impaired Heel-to-Shin Bilateral] : not present [2+] : Patella left 2+ [0] : Ankle jerk left 0 [Plantar Reflex Right Only] : normal on the right [Plantar Reflex Left Only] : normal on the left [Neck Appearance] : the appearance of the neck was normal [] : the neck was supple [Neck Cervical Mass (___cm)] : no neck mass was observed [Arterial Pulses Carotid] : carotid pulses were normal with no bruits [Full Pulse] : the pedal pulses are present [Edema] : there was no peripheral edema [Skin Color & Pigmentation] : normal skin color and pigmentation [Skin Turgor] : normal skin turgor

## 2023-08-30 NOTE — DATA REVIEWED
[de-identified] :  	Sodium	140 mmol/L	 	135-145	  	Potassium	4.8 mmol/L	 	3.5-5.3	  	Chloride	102 mmol/L	 		  	CO2	28 mmol/L	 	22-31	  	Anion Gap, Serum	10 mmol/L	 	5-17	  	Glucose	98 mg/dL	 	70-99	  	BUN	20 mg/dL	 	7-23	 	Creatinine	1.34 mg/dL	H	0.50-1.30	  	Total Protein	7.0 g/dL	 	6.0-8.3	  	Albumin	4.7 g/dL	 	3.3-5.0	  	Calcium, Serum	9.5 mg/dL	 	8.4-10.5	  	Total Bilirubin	0.3 mg/dL	 	0.2-1.2	  	AST (SGOT)	27 U/L	 	10-40	  	ALT(SGPT)	18 U/L	 	10-45	  	ALK PHOS	56 U/L	 		 	eGFR	41 mL/min/1.73m2	L	>=60	  	 The estimated glomerular filtration rate (eGFR) is calculated using the 2021 CKD-EPI creatinine equation, which does not have a coefficient for race and is validated in individuals 18 years of age and older (N Engl J Med 2021; 385:1371-3861). Creatinine-based eGFR may be inaccurate in various situations including but not limited to extremes of muscle mass, altered dietary protein intake, or medications that affect renal tubular creatinine secretion.

## 2023-08-30 NOTE — HISTORY OF PRESENT ILLNESS
[FreeTextEntry1] : 78-year-old woman right-handed with a history of breast cancer, now cancer free complaining now for the last 2 years or so of increasing difficulty with short-term memory, reports having difficulty keeping his story line, she will read a book and if she leaves it the next day will have no recollection of reading it, will watch a movie and if she again picks it up another time she will remember what she like just recently saw.  She also does as a hobby origami in the past no difficulty doing any of the puzzles, more recently finds her self that she is having more difficulty is taking her longer and some that she can finish the task.  No episodes of disorientation, no blackouts, no confusion as to time or place.  No history of stroke, seizures, blackouts, history of depression on therapy.  Denies any change in her mood lately. No trouble sleeping, denies hallucinations, waking up at night with vivid dreams.  No restlessness versus sleeping. Denies dizziness or lightheadedness when she stands up, no numbness tingling of the extremities.  No headache, no dizzy spells.  No transient loss of vision or double vision, no trouble speaking or finding words, no unilateral episodes of weakness or numbness of face or extremities.  No recent falls or injuries.  No incontinence of urine, no change in bowel bladder function. Patient lives alone, has 1 surviving sibling younger brother, who she reports is doing well.  No history of dementia as far she can tell in the family.  Able to take care of her day-to-day needs, still drives usually stays local now for the last several years.  No episode of getting lost or confused as to location or where she is going to.  Has had no difficulty doing her finances. Has been told in the past that she has low B12, takes supplements the last serum done, was 2021 at 293.

## 2023-09-05 ENCOUNTER — APPOINTMENT (OUTPATIENT)
Dept: NEUROLOGY | Facility: CLINIC | Age: 78
End: 2023-09-05
Payer: MEDICARE

## 2023-09-05 PROCEDURE — 95816 EEG AWAKE AND DROWSY: CPT

## 2023-09-07 LAB
FOLATE SERPL-MCNC: 6.2 NG/ML
HCYS SERPL-MCNC: 8.2 UMOL/L
VIT B12 SERPL-MCNC: 1006 PG/ML

## 2023-09-12 LAB — METHYLMALONATE SERPL-SCNC: 158 NMOL/L

## 2023-09-17 NOTE — H&P PST ADULT - CORNEAL ABRASION RISK
Continue home lisinopril 40mg
Continue home lisinopril 40mg.
Continue home lisinopril 40mg
Continue home lisinopril 40mg.
Continue home lisinopril 40mg
Continue home lisinopril 40mg.
Continue home lisinopril 40mg
Continue home lisinopril 40mg.
none

## 2023-09-27 NOTE — PATIENT PROFILE ADULT. - VISION (WITH CORRECTIVE LENSES IF THE PATIENT USUALLY WEARS THEM):
Detail Level: Simple Partially impaired: cannot see medication labels or newsprint, but can see obstacles in path, and the surrounding layout; can count fingers at arm's length

## 2023-10-24 NOTE — ED BEHAVIORAL HEALTH ASSESSMENT NOTE - NS ED [facility] MED ROS ENT MOUTH
No complaints Bilobed Flap Text: The defect edges were debeveled with a #15 scalpel blade.  Given the location of the defect and the proximity to free margins a bilobe flap was deemed most appropriate.  Using a sterile surgical marker, an appropriate bilobed flap drawn around the defect.    The area thus outlined was incised deep to adipose tissue with a #15 scalpel blade and carried over to the primary defect.  The skin margins were undermined to an appropriate distance in all directions utilizing iris scissors.

## 2023-10-29 ENCOUNTER — RESULT REVIEW (OUTPATIENT)
Age: 78
End: 2023-10-29

## 2023-10-30 ENCOUNTER — APPOINTMENT (OUTPATIENT)
Dept: NUCLEAR MEDICINE | Facility: CLINIC | Age: 78
End: 2023-10-30
Payer: MEDICARE

## 2023-10-30 ENCOUNTER — OUTPATIENT (OUTPATIENT)
Dept: OUTPATIENT SERVICES | Facility: HOSPITAL | Age: 78
LOS: 1 days | End: 2023-10-30

## 2023-10-30 DIAGNOSIS — Z98.890 OTHER SPECIFIED POSTPROCEDURAL STATES: Chronic | ICD-10-CM

## 2023-10-30 DIAGNOSIS — Z90.11 ACQUIRED ABSENCE OF RIGHT BREAST AND NIPPLE: Chronic | ICD-10-CM

## 2023-10-30 DIAGNOSIS — R41.3 OTHER AMNESIA: ICD-10-CM

## 2023-10-30 DIAGNOSIS — Z90.710 ACQUIRED ABSENCE OF BOTH CERVIX AND UTERUS: Chronic | ICD-10-CM

## 2023-10-30 PROCEDURE — 78999 UNLISTED MISC PX DX NUC MED: CPT | Mod: 26,MH

## 2023-10-30 PROCEDURE — 78608 BRAIN IMAGING (PET): CPT | Mod: 26,76

## 2023-10-30 PROCEDURE — 78608 BRAIN IMAGING (PET): CPT | Mod: 26

## 2023-10-31 ENCOUNTER — TRANSCRIPTION ENCOUNTER (OUTPATIENT)
Age: 78
End: 2023-10-31

## 2023-11-03 ENCOUNTER — APPOINTMENT (OUTPATIENT)
Dept: NEUROLOGY | Facility: CLINIC | Age: 78
End: 2023-11-03
Payer: MEDICARE

## 2023-11-03 PROCEDURE — 99214 OFFICE O/P EST MOD 30 MIN: CPT

## 2023-11-03 PROCEDURE — 96138 PSYCL/NRPSYC TECH 1ST: CPT | Mod: 59

## 2023-11-03 PROCEDURE — 96132 NRPSYC TST EVAL PHYS/QHP 1ST: CPT | Mod: 59

## 2023-12-06 ENCOUNTER — APPOINTMENT (OUTPATIENT)
Dept: HEMATOLOGY ONCOLOGY | Facility: CLINIC | Age: 78
End: 2023-12-06

## 2023-12-09 NOTE — ASU DISCHARGE PLAN (ADULT/PEDIATRIC) - NURSING INSTRUCTIONS
CALL the DOCTOR:    - Any pain that appears to be getting worse.  -  If you have  not urinated 8 hours after surgery or have any difficulty urinating.     A responsible adult should be with you for the rest of the day and night for your safety and to help you if you needed.    Review attached FACT SHEET if applicable.
stretcher

## 2023-12-12 NOTE — DISCHARGE NOTE ADULT - NS AS DC FOLLOWUP STROKE INST
Missing rear molars, poor dentition with several chipped teeth, ground/worn teeth, cavities./missing teeth
Smoking Cessation

## 2023-12-23 NOTE — DISCHARGE NOTE PROVIDER - CARE PROVIDERS DIRECT ADDRESSES
[email or web address] CBC Full  -  ( 22 Dec 2023 14:00 )  WBC Count : 3.53 K/uL  RBC Count : 3.27 M/uL  Hemoglobin : 10.0 g/dL  Hematocrit : 29.3 %  Platelet Count - Automated : 108 K/uL  Mean Cell Volume : 89.6 fL  Mean Cell Hemoglobin : 30.6 pg  Mean Cell Hemoglobin Concentration : 34.1 gm/dL  Auto Neutrophil # : 2.23 K/uL  Auto Lymphocyte # : 0.47 K/uL  Auto Monocyte # : 0.22 K/uL  Auto Eosinophil # : 0.33 K/uL  Auto Basophil # : 0.02 K/uL  Auto Neutrophil % : 63.2 %  Auto Lymphocyte % : 13.3 %  Auto Monocyte % : 6.2 %  Auto Eosinophil % : 9.3 %  Auto Basophil % : 0.6 %  Urinalysis Basic - ( 22 Dec 2023 16:40 )    Color: Yellow / Appearance: Clear / S.007 / pH: x  Gluc: x / Ketone: Negative mg/dL  / Bili: Negative / Urobili: 0.2 mg/dL   Blood: x / Protein: Negative mg/dL / Nitrite: Negative   Leuk Esterase: Negative / RBC: 14 /HPF / WBC 0 /HPF   Sq Epi: x / Non Sq Epi: 0 /HPF / Bacteria: Negative /HPF

## 2024-01-26 ENCOUNTER — OUTPATIENT (OUTPATIENT)
Dept: OUTPATIENT SERVICES | Facility: HOSPITAL | Age: 79
LOS: 1 days | Discharge: HOME | End: 2024-01-26

## 2024-01-26 DIAGNOSIS — C50.911 MALIGNANT NEOPLASM OF UNSPECIFIED SITE OF RIGHT FEMALE BREAST: ICD-10-CM

## 2024-01-26 DIAGNOSIS — Z95.5 PRESENCE OF CORONARY ANGIOPLASTY IMPLANT AND GRAFT: Chronic | ICD-10-CM

## 2024-01-26 DIAGNOSIS — Z90.710 ACQUIRED ABSENCE OF BOTH CERVIX AND UTERUS: Chronic | ICD-10-CM

## 2024-01-26 DIAGNOSIS — Z90.11 ACQUIRED ABSENCE OF RIGHT BREAST AND NIPPLE: Chronic | ICD-10-CM

## 2024-01-26 DIAGNOSIS — Z98.890 OTHER SPECIFIED POSTPROCEDURAL STATES: Chronic | ICD-10-CM

## 2024-01-28 NOTE — PROGRESS NOTE BEHAVIORAL HEALTH - NSBHCHARTREVIEWINVESTIGATE_PSY_A_CORE FT
Group beta Strep positive
MEDICATIONS  (STANDING):  lurasidone 20milliGRAM(s) Oral at bedtime  vilaZODone 20milliGRAM(s) Oral daily  trimethoprim  160 mG/sulfamethoxazole 800 mG 1Tablet(s) Oral two times a day    MEDICATIONS  (PRN):  LORazepam     Tablet 0.5milliGRAM(s) Oral every 6 hours PRN severe anxiety/agiation  ibuprofen  Tablet 600milliGRAM(s) Oral four times a day PRN pain
MEDICATIONS  (STANDING):  lurasidone 40milliGRAM(s) Oral at bedtime  trimethoprim  160 mG/sulfa methoxazole 800 mG 1Tablet(s) Oral two times a day ( s/p plastic surgery procedure after pt SIB via bilateral wrist cutting on 2/10/17)    MEDICATIONS  (PRN):  LORazepam     Tablet 0.5milliGRAM(s) Oral every 6 hours PRN severe anxiety/agiation  ibuprofen  Tablet 600milliGRAM(s) Oral four times a day PRN pain

## 2024-01-30 ENCOUNTER — APPOINTMENT (OUTPATIENT)
Dept: HEMATOLOGY ONCOLOGY | Facility: CLINIC | Age: 79
End: 2024-01-30
Payer: MEDICARE

## 2024-01-30 VITALS
BODY MASS INDEX: 24.55 KG/M2 | SYSTOLIC BLOOD PRESSURE: 129 MMHG | HEART RATE: 82 BPM | OXYGEN SATURATION: 95 % | TEMPERATURE: 97.3 F | HEIGHT: 61 IN | WEIGHT: 130 LBS | DIASTOLIC BLOOD PRESSURE: 72 MMHG

## 2024-01-30 DIAGNOSIS — Z79.899 ENCOUNTER FOR THERAPEUTIC DRUG LVL MONITORING: ICD-10-CM

## 2024-01-30 DIAGNOSIS — Z51.81 ENCOUNTER FOR THERAPEUTIC DRUG LVL MONITORING: ICD-10-CM

## 2024-01-30 DIAGNOSIS — C50.811 MALIGNANT NEOPLASM OF OVERLAPPING SITES OF RIGHT FEMALE BREAST: ICD-10-CM

## 2024-01-30 DIAGNOSIS — Z17.0 MALIGNANT NEOPLASM OF OVERLAPPING SITES OF RIGHT FEMALE BREAST: ICD-10-CM

## 2024-01-30 PROCEDURE — 99214 OFFICE O/P EST MOD 30 MIN: CPT

## 2024-01-30 RX ORDER — TAMOXIFEN CITRATE 20 MG/1
20 TABLET, FILM COATED ORAL DAILY
Qty: 90 | Refills: 3 | Status: ACTIVE | COMMUNITY
Start: 2021-06-15 | End: 1900-01-01

## 2024-01-30 NOTE — PHYSICAL EXAM
[Normal] : affect appropriate [de-identified] : looks well  [de-identified] : Right mastectomy with implant - very fibrotic and slightly nodular scar as before  , left breast s/p lumpectomy, no palpable masses, no axillary adenopathy b/l

## 2024-01-30 NOTE — REVIEW OF SYSTEMS
[Patient Intake Form Reviewed] : Patient intake form was reviewed [Negative] : Allergic/Immunologic [Fatigue] : no fatigue [Shortness Of Breath] : shortness of breath [Muscle Weakness] : no muscle weakness [de-identified] : chronic memory issues

## 2024-01-30 NOTE — HISTORY OF PRESENT ILLNESS
[Disease: _____________________] : Disease: [unfilled] [T: ___] : T[unfilled] [N: ___] : N[unfilled] [AJCC Stage: ____] : AJCC Stage: [unfilled] [de-identified] : ALVAREZ LÓPEZ is a 76 y.o. with a PMH significant for HLD, NSTEMI 6/2018 -> stent, and lumbar spondylosis, who we are following for bilateral metachronous breast cancers: a remote hx of a left breast cancer in 2010 s/p lumpectomy and XRT, and 20202 ER+, HER2- right sided stage IIB BC, prognostic IIA. \par  \par  1/6/21 - Right nipple sparing mastectomy with SLND -> ALND, expander reconstruction.  PATH - IDC x 2 sites.  Largest 2.4cm IDC (6/9) with extensive DCIS, cribriform and solid type, intermediate grade with necrosis. +LVI.  Second area was 1.9cm IDC (6 - 7/9) with focal DCIS, cribriform type with calcifications.  +LVI, +PNI.  \par  1 SLN+ (0.8cm, +IRLANDA), 10 additional LN's negative.  margins negative, but closest margin anterior 0.3cm.\par  [id number] = IIB, prognostic IIA.\par  \par  2/9/21 - 5/4/21 - Adjuvant chemotherapy with TC x 5.  \par  \par  5/12/21 - 5/16/21 - Was admitted to  for SOB, weakness, neutropenia.  Did not have fevers - did not get antibiotics.  \par  CT Angio negative for PE. Small right pleural effusion. \par  Chemotx stopped - would not tolerate cycle 6.\par  \par  Tamoxifen started June 2021. ( had hysterectomy;  no AI due to osteopenia/ osteoporosis and dental issues with Prolia) \par  \par  Had completion of breast reconstruction ( implant ) 2021.\par   [de-identified] : moderately differentiated infiltrating ductal carcinoma (6/9), no LVI [de-identified] : ER 90%, KY 40%, HER2 negative\par  10/30/20 - INVITAE - negative [de-identified] : Feels well.  No complaints.   She sold her house and will be moving to [place] to live close to her son.

## 2024-01-30 NOTE — ASSESSMENT
[FreeTextEntry1] : 77 y/o female with bilateral metachronous breast cancers  Remote hx of a left breast cancer in 2010 s/p lumpectomy and XRT  2021 ER+, HER2- right sided BC s/p right nipple sparing mastectomy with expander reconstruction for stage IIB, prognostic IIA disease.  Adjuvant chemotherapy ( Dr Lanier) TC x 5 2/9/21 - 5/4/21 ( cycle 6 th omitted due to toxicity)  On TAmoxifen since June 2021. Tolerating well. Plan for total max 10 years of adjuvant hormonal therapy.   L breast mammo is due.  She follows with breast surgery.  She will be moving to [place] in next few months. Will follow up with oncologist in [place] in ~ 6 months.

## 2024-01-31 ENCOUNTER — APPOINTMENT (OUTPATIENT)
Dept: NEUROLOGY | Facility: CLINIC | Age: 79
End: 2024-01-31
Payer: MEDICARE

## 2024-01-31 VITALS
HEART RATE: 75 BPM | HEIGHT: 61 IN | TEMPERATURE: 98.2 F | BODY MASS INDEX: 24.55 KG/M2 | DIASTOLIC BLOOD PRESSURE: 71 MMHG | WEIGHT: 130 LBS | SYSTOLIC BLOOD PRESSURE: 125 MMHG

## 2024-01-31 DIAGNOSIS — F02.80 ALZHEIMER'S DISEASE, UNSPECIFIED: ICD-10-CM

## 2024-01-31 DIAGNOSIS — Z08 ENCOUNTER FOR FOLLOW-UP EXAMINATION AFTER COMPLETED TREATMENT FOR MALIGNANT NEOPLASM: ICD-10-CM

## 2024-01-31 DIAGNOSIS — G30.9 ALZHEIMER'S DISEASE, UNSPECIFIED: ICD-10-CM

## 2024-01-31 DIAGNOSIS — C50.811 MALIGNANT NEOPLASM OF OVERLAPPING SITES OF RIGHT FEMALE BREAST: ICD-10-CM

## 2024-01-31 DIAGNOSIS — Z51.81 ENCOUNTER FOR THERAPEUTIC DRUG LEVEL MONITORING: ICD-10-CM

## 2024-01-31 PROCEDURE — G2211 COMPLEX E/M VISIT ADD ON: CPT

## 2024-01-31 PROCEDURE — 99214 OFFICE O/P EST MOD 30 MIN: CPT

## 2024-01-31 RX ORDER — MEMANTINE HYDROCHLORIDE 5 MG/1
5 TABLET, FILM COATED ORAL
Qty: 50 | Refills: 1 | Status: ACTIVE | COMMUNITY
Start: 2024-01-31 | End: 1900-01-01

## 2024-01-31 NOTE — PHYSICAL EXAM
[General Appearance - Alert] : alert [General Appearance - In No Acute Distress] : in no acute distress [Total Score ___ / 30] : the patient achieved a score of [unfilled] /30 [Date / Time ___ / 5] : date / time [unfilled] / 5 [Place ___ / 5] : place [unfilled] / 5 [Registration ___ / 3] : registration [unfilled] / 3 [Serial Sevens ___/5] : serial sevens [unfilled] / 5 [Naming 2 Objects ___ / 2] : naming two objects [unfilled] / 2 [Repeating a Sentence ___ / 1] : repeating a sentence [unfilled] / 1 [Writing a Sentence ___ / 1] : write sentence [unfilled] / 1 [3-stage Verbal Command ___ / 3] : three-stage verbal command [unfilled] / 3 [Written Command ___ / 1] : written command [unfilled] / 1 [Copy a Design ___ / 1] : copy a design [unfilled] / 1 [Recall ___ / 3] : recall [unfilled] / 3 [Cranial Nerves Optic (II)] : visual acuity intact bilaterally,  visual fields full to confrontation, pupils equal round and reactive to light [Cranial Nerves Oculomotor (III)] : extraocular motion intact [Cranial Nerves Trigeminal (V)] : facial sensation intact symmetrically [Cranial Nerves Facial (VII)] : face symmetrical [Cranial Nerves Vestibulocochlear (VIII)] : hearing was intact bilaterally [Cranial Nerves Glossopharyngeal (IX)] : tongue and palate midline [Cranial Nerves Accessory (XI - Cranial And Spinal)] : head turning and shoulder shrug symmetric [Cranial Nerves Hypoglossal (XII)] : there was no tongue deviation with protrusion [Motor Tone] : muscle tone was normal in all four extremities [Motor Strength] : muscle strength was normal in all four extremities [No Muscle Atrophy] : normal bulk in all four extremities [Motor Handedness Right-Handed] : the patient is right hand dominant [Sensation Tactile Decrease] : light touch was intact [Romberg's Sign] : Romberg's sign was negtive [Abnormal Walk] : normal gait [Balance] : balance was intact [Past-pointing] : there was no past-pointing [Tremor] : no tremor present [Dysdiadochokinesia Bilaterally] : not present [2+] : Patella left 2+ [0] : Ankle jerk left 0

## 2024-01-31 NOTE — DISCUSSION/SUMMARY
[FreeTextEntry1] : 78-year-old woman with history of dementia, reviewed and discussed recent treatments.  Potential options. Plan: Will increase donepezil to 10 mg p.o. nightly. If tolerated will add Namenda titration, 5 mg p.o. daily x 1 week, then 5 mg twice a day for x 1 week, and 5 mg in the morning and 10 mg at night, then 10 mg twice a day. Advised patient to obtain local physician and [place], when she has the name, we can send in records over there. Can also send in transfer her prescriptions.

## 2024-01-31 NOTE — HISTORY OF PRESENT ILLNESS
[FreeTextEntry1] : 78-year-old woman right-handed with a history of breast cancer, hyperlipidemia, dementia here for follow-up visit.  Complains of episodes of confusion especially when driving, will forget to make a turn, and have difficulty then rerouting herself.  Forgetfulness where she places an object, so she now finding himself depending on list. No reported hallucinations, no increased anxiety or depression.  Takes trazodone for sleep. No reported headache, no dizzy spells, no periods of blackouts, transient unilateral vision loss, difficulty finding words, no difficulty writing, no transient lateral weakness or numbness. Evaluations including a MRI PET, consistent with underlying dementia, questionable Lewy body. Started on donepezil 5 mg p.o. nightly which he says is tolerable.  No side effects reported.  But noted no significant change in her memory. Patient also reports getting ready to move to [place] as a permanent resident 2 months, will move near her son.

## 2024-01-31 NOTE — DATA REVIEWED
[de-identified] :   	 EXAM: [id number] - PET BRAIN FDG DEMENTIA  - ORDERED BY: MACY BRITTON   *** ADDENDUM # 1 ***  HISTORY: Evaluate for AD versus FTD. Progressive cognitive decline since 2021 characterized by memory loss. MMSE score is 27 of 30.  TECHNIQUE: On October 30, 2023 patient was given 7.7 millicuries of FDG intravenously while semisupine in the resting state. Fasting blood sugar measured prior to injection of radiopharmaceutical was 90 mg/dl. Approximately one hour post injection of radiotracer, dedicated PET and CT images of the brain were obtained as per routine protocol. The CT protocol was optimized for PET attenuation correction and to provide anatomic detail for localization of PET abnormalities. 3-D reconstructions and PET-MRI fusions were performed utilizing [employer] and reviewed.  COMPARISON: MRI brain 3/16/2015.  FINDINGS:  PET FINDINGS:  Visually, there is abnormal FDG distribution pattern with moderate to severe decreased radiotracer uptake in the anteromedial temporal lobes (right greater than left). There is extension of abnormal hypometabolism into the adjacent orbitofrontal and paramedian frontal regions, including in the anterior cingulate gyri bilaterally. There is mild-to-moderate lateral parieto-occipital and temporal hypometabolism. Note, there is mild-to-moderate hypometabolism in the bilateral precuneus with preserved relatively normal tracer uptake in the posterior cingulate gyri, regions typically involved early in the course of Alzheimer's disease. There is mild hypometabolism in the sensorimotor cortex and cerebellum and moderate hypometabolism in the brainstem, nonspecific finding, favored to reflect effects of neurotropic medications.  There is preserved relatively normal brain metabolism in the primary visual cortex and subcortical structures including the basal ganglia and thalami bilaterally.  Semiquantitative analysis using the Z scores calculated in comparison to age-matched normal controls revealed significantly decreased values in the bilateral temporal lobes, bilateral superior temporal gyri, bilateral medial temporal lobes, left parahippocampal gyrus, right hippocampus, left amygdala, right temporal pole, bilateral temporal operculum, left Heschl gyrus, bilateral olfactory cortex, right posterior orbital gyrus, bilateral precuneus, bilateral superior parietal lobules, right rolandic operculum, bilateral retrosplenial area, and pontine tegmentum.   MRI FINDINGS:  There are scattered T2/FLAIR hyperintense foci in the subcortical and periventricular white matter, nonspecific finding, favored to reflect sequelae of mild chronic microvascular ischemic disease.  There is cortical sulcal prominence related to underlying brain parenchymal volume loss which appears most pronounced in the parieto-occipital and mesial temporal regions (right greater than left).  No acute infarction, intracranial hemorrhage or mass.  There is no evidence of hydrocephalus. There are no extra-axial fluid collections. The skull base flow voids are present.  The patient is status post bilateral lens replacement. The imaged portions of the paranasal sinuses are aerated. The mastoid air cells are clear. The visualized soft tissues and osseous structures appear unremarkable.   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  Note, given lateral parieto-occipital and temporal hypometabolism, possibility of mixed dementia with concomitant dementia with Lewy bodies, may be considered in the proper clinical setting.  Correlation with clinical exam and neuropsychological assessment is advised.  --- End of Report ---  *** END OF ADDENDUM # 1 ***  PROCEDURE DATE:  10/30/2023    INTERPRETATION:  HISTORY: Evaluate for AD versus FTD. Progressive cognitive decline since 2021 characterized by memory loss. MMSE score is 27 of 30.  TECHNIQUE: On October 30, 2023 patient was given 7.7 millicuries of FDG intravenously while semisupine in the resting state. Fasting blood sugar measured prior to injection of radiopharmaceutical was 90 mg/dl. Approximately one hour post injection of radiotracer, dedicated PET and CT images of the brain were obtained as per routine protocol. The CT protocol was optimized for PET attenuation correction and to provide anatomic detail for localization of PET abnormalities. 3-D reconstructions and PET-MRI fusions were performed utilizing [employer] and reviewed.  COMPARISON: MRI brain 10/30/2023.  FINDINGS:  PET FINDINGS:  Visually, there is abnormal FDG distribution pattern with moderate to severe decreased radiotracer uptake in the anteromedial temporal lobes (right greater than left). There is extension of abnormal hypometabolism into the adjacent orbitofrontal and paramedian frontal regions, including in the anterior cingulate gyri bilaterally. There is mild-to-moderate lateral parieto-occipital and temporal hypometabolism. Note, there is mild-to-moderate hypometabolism in the bilateral precuneus with preserved relatively normal tracer uptake in the posterior cingulate gyri, regions typically involved early in the course of Alzheimer's disease. There is mild hypometabolism in the sensorimotor cortex and cerebellum and moderate hypometabolism in the brainstem, nonspecific finding, favored to reflect effects of neurotropic medications.  There is preserved relatively normal brain metabolism in the primary visual cortex and subcortical structures including the basal ganglia and thalami bilaterally.  Semiquantitative analysis using the Z scores calculated in comparison to age-matched normal controls revealed significantly decreased values in the bilateral temporal lobes, bilateral superior temporal gyri, bilateral medial temporal lobes, left parahippocampal gyrus, right hippocampus, left amygdala, right temporal pole, bilateral temporal operculum, left Heschl gyrus, bilateral olfactory cortex, right posterior orbital gyrus, bilateral precuneus, bilateral superior parietal lobules, right rolandic operculum, bilateral retrosplenial area, and pontine tegmentum.   MRI FINDINGS:  There are scattered T2/FLAIR hyperintense foci in the subcortical and periventricular white matter, nonspecific finding, favored to reflect sequelae of mild chronic microvascular ischemic disease.  There is cortical sulcal prominence related to underlying brain parenchymal volume loss which appears most pronounced in the parieto-occipital and mesial temporal regions (right greater than left).  No acute infarction, intracranial hemorrhage or mass.  There is no evidence of hydrocephalus. There are no extra-axial fluid collections. The skull base flow voids are present.  The patient is status post bilateral lens replacement. The imaged portions of the paranasal sinuses are aerated. The mastoid air cells are clear. The visualized soft tissues and osseous structures appear unremarkable.   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  Note, given lateral parieto-occipital and temporal hypometabolism, possibility of mixed dementia with concomitant dementia with Lewy bodies, may be considered in the proper clinical setting.  Correlation with clinical exam and neuropsychological assessment is advised.  --- End of Report ---   ***Please see the addendum at the top of this report. It may contain additional important information or changes.****    CLINT PERSON MD; Attending Radiologist This document has been electronically signed. Oct 30 2023  8:37PM 1st Addendum: CLINT PERSON MD; Attending Radiologist The first addendum was electronically signed on: Nov 15 2023 11:33PM. [de-identified] : Normal awake and drowsy EEG performed September 5, 2023.

## 2024-02-08 ENCOUNTER — APPOINTMENT (OUTPATIENT)
Dept: BREAST CENTER | Facility: CLINIC | Age: 79
End: 2024-02-08
Payer: MEDICARE

## 2024-02-08 VITALS
SYSTOLIC BLOOD PRESSURE: 118 MMHG | HEIGHT: 61 IN | BODY MASS INDEX: 24.55 KG/M2 | DIASTOLIC BLOOD PRESSURE: 73 MMHG | HEART RATE: 73 BPM | WEIGHT: 130 LBS

## 2024-02-08 DIAGNOSIS — Z85.3 ENCOUNTER FOR FOLLOW-UP EXAMINATION AFTER COMPLETED TREATMENT FOR MALIGNANT NEOPLASM: ICD-10-CM

## 2024-02-08 DIAGNOSIS — Z08 ENCOUNTER FOR FOLLOW-UP EXAMINATION AFTER COMPLETED TREATMENT FOR MALIGNANT NEOPLASM: ICD-10-CM

## 2024-02-08 DIAGNOSIS — N63.10 UNSPECIFIED LUMP IN THE RIGHT BREAST, UNSPECIFIED QUADRANT: ICD-10-CM

## 2024-02-08 PROCEDURE — 99214 OFFICE O/P EST MOD 30 MIN: CPT

## 2024-02-08 NOTE — DATA REVIEWED
[FreeTextEntry1] : US guided core biopsy right breast (7:00 N6 winged shaped clip) : 10/27/20  PATHOLOGY:  Infiltrating ductal carcinoma, moderately differentiated. No LVI.  ER - - 90%,  IA -40%, HER2 - negative.   Ultrasound guided core biopsy Right breast 7:00 N4: 11/19/20   Pathology: Infiltrating Ductal carcinoma. ( 6/9). ER - 95%, IA - 60%, HER2 - negative.   SURGICAL PATHOLOGY:  1/6/21  Results:  1) Right breast - retronipple tissue - negative.  Infiltrating ductal carcinoma, 2 sites measuring 2.4 cm and 1.9 cm. Milan score 6/9.  Margins negative.   [place] node # 1 - 8 mm metastatic focus with extranodal extension.  Axillary lymphadenectomy - 10 negative nodes.    ER and IA positive, HER 2 negative ( for both tumors).  I have independently reviewed the reports and the images.   L mammogram and US 12/1/22 - scattered fibroglandular density - BIRADS 2   R US 8/11/23 - implant - no abnormality at area of concern - BIRADS 2

## 2024-02-08 NOTE — HISTORY OF PRESENT ILLNESS
[FreeTextEntry1] : Ms. Dotson is a 79 year old woman here for a follow-up for surveillance for breast cancer. Her breast history is significant for  1.) L breast DCIS diagnosed in 2001 at age 56, pathologic stage 0, pTis - s/p L lumpectomy (Dr. Smallwood, 8/2001) = DCIS, high grade - s/p radiation  2.) Right multicentric infiltrating ductal carcinoma diagnosed in 2020 at age 75, pathologic stage II, pT2 pN1, ER 90%, NV 40%, Her2 negative - s/p R nipple sparing mastectomy, axillary node dissection (1/6/2021, Dr. Smallwood) with implant based reconstruction (Dr. Rueda) = 2.4 cm and 1.9 cm tumors, 0/1 sln with 0.8 cm tumor and FAY, 0/10 additional LN - s/p subsequent L mastopexy - s/p chemotherapy (Dr. Colunga, TC x 5) - on tamoxifen  The swelling in the right reconstructed breast has been the same. No lumps.  Her genetic panel testing is negative.

## 2024-02-08 NOTE — PHYSICAL EXAM
[Normocephalic] : normocephalic [Sclera nonicteric] : sclera nonicteric [Supple] : supple [No Supraclavicular Adenopathy] : no supraclavicular adenopathy [No Cervical Adenopathy] : no cervical adenopathy [Clear to Auscultation Bilat] : clear to auscultation bilaterally [Normal Sinus Rhythm] : normal sinus rhythm [Examined in the supine and seated position] : examined in the supine and seated position [Asymmetrical] : asymmetrical [Grade 2] : Ptosis Grade 2 [No dominant masses] : no dominant masses in right breast  [No dominant masses] : no dominant masses left breast [No Nipple Retraction] : no left nipple retraction [No Nipple Discharge] : no left nipple discharge [No Axillary Lymphadenopathy] : no left axillary lymphadenopathy [No Edema] : no edema [No Rashes] : no rashes [No Ulceration] : no ulceration [de-identified] : Superior periareolar scar is retracted down. Implant. 1 cm firm nodule at 10:00 retroareolar, inferior to the lateral aspect of the periareolar scar [de-identified] : Curvilinear scar at 1-2:00 with mild volume loss. Irwin pattern scar

## 2024-02-21 ENCOUNTER — RESULT REVIEW (OUTPATIENT)
Age: 79
End: 2024-02-21

## 2024-02-21 ENCOUNTER — OUTPATIENT (OUTPATIENT)
Dept: OUTPATIENT SERVICES | Facility: HOSPITAL | Age: 79
LOS: 1 days | End: 2024-02-21
Payer: MEDICARE

## 2024-02-21 ENCOUNTER — APPOINTMENT (OUTPATIENT)
Dept: ULTRASOUND IMAGING | Facility: CLINIC | Age: 79
End: 2024-02-21
Payer: MEDICARE

## 2024-02-21 ENCOUNTER — APPOINTMENT (OUTPATIENT)
Dept: MAMMOGRAPHY | Facility: CLINIC | Age: 79
End: 2024-02-21

## 2024-02-21 DIAGNOSIS — Z90.710 ACQUIRED ABSENCE OF BOTH CERVIX AND UTERUS: Chronic | ICD-10-CM

## 2024-02-21 DIAGNOSIS — Z98.890 OTHER SPECIFIED POSTPROCEDURAL STATES: Chronic | ICD-10-CM

## 2024-02-21 DIAGNOSIS — Z95.5 PRESENCE OF CORONARY ANGIOPLASTY IMPLANT AND GRAFT: Chronic | ICD-10-CM

## 2024-02-21 DIAGNOSIS — Z85.3 PERSONAL HISTORY OF MALIGNANT NEOPLASM OF BREAST: ICD-10-CM

## 2024-02-21 DIAGNOSIS — Z90.11 ACQUIRED ABSENCE OF RIGHT BREAST AND NIPPLE: Chronic | ICD-10-CM

## 2024-02-21 DIAGNOSIS — N63.10 UNSPECIFIED LUMP IN THE RIGHT BREAST, UNSPECIFIED QUADRANT: ICD-10-CM

## 2024-02-21 PROCEDURE — G0279: CPT

## 2024-02-21 PROCEDURE — 76642 ULTRASOUND BREAST LIMITED: CPT | Mod: 26,RT

## 2024-02-21 PROCEDURE — G0279: CPT | Mod: 26

## 2024-02-21 PROCEDURE — 76642 ULTRASOUND BREAST LIMITED: CPT

## 2024-02-21 PROCEDURE — 77065 DX MAMMO INCL CAD UNI: CPT

## 2024-02-21 PROCEDURE — 77065 DX MAMMO INCL CAD UNI: CPT | Mod: 26,LT

## 2024-02-29 ENCOUNTER — APPOINTMENT (OUTPATIENT)
Dept: NEUROLOGY | Facility: CLINIC | Age: 79
End: 2024-02-29
Payer: MEDICARE

## 2024-02-29 VITALS
SYSTOLIC BLOOD PRESSURE: 124 MMHG | HEIGHT: 61 IN | BODY MASS INDEX: 24.55 KG/M2 | WEIGHT: 130 LBS | HEART RATE: 63 BPM | DIASTOLIC BLOOD PRESSURE: 71 MMHG | TEMPERATURE: 98.2 F

## 2024-02-29 PROCEDURE — G2211 COMPLEX E/M VISIT ADD ON: CPT

## 2024-02-29 PROCEDURE — 99214 OFFICE O/P EST MOD 30 MIN: CPT

## 2024-02-29 RX ORDER — MEMANTINE HYDROCHLORIDE 10 MG/1
10 TABLET, FILM COATED ORAL
Qty: 180 | Refills: 2 | Status: ACTIVE | COMMUNITY
Start: 2024-02-29 | End: 1900-01-01

## 2024-02-29 NOTE — PHYSICAL EXAM
[General Appearance - Alert] : alert [General Appearance - In No Acute Distress] : in no acute distress [Date / Time ___ / 5] : date / time [unfilled] / 5 [Total Score ___ / 30] : the patient achieved a score of [unfilled] /30 [Place ___ / 5] : place [unfilled] / 5 [Registration ___ / 3] : registration [unfilled] / 3 [Naming 2 Objects ___ / 2] : naming two objects [unfilled] / 2 [Serial Sevens ___/5] : serial sevens [unfilled] / 5 [Repeating a Sentence ___ / 1] : repeating a sentence [unfilled] / 1 [3-stage Verbal Command ___ / 3] : three-stage verbal command [unfilled] / 3 [Writing a Sentence ___ / 1] : write sentence [unfilled] / 1 [Written Command ___ / 1] : written command [unfilled] / 1 [Copy a Design ___ / 1] : copy a design [unfilled] / 1 [Recall ___ / 3] : recall [unfilled] / 3 [Cranial Nerves Optic (II)] : visual acuity intact bilaterally,  visual fields full to confrontation, pupils equal round and reactive to light [Cranial Nerves Oculomotor (III)] : extraocular motion intact [Cranial Nerves Facial (VII)] : face symmetrical [Cranial Nerves Trigeminal (V)] : facial sensation intact symmetrically [Cranial Nerves Glossopharyngeal (IX)] : tongue and palate midline [Cranial Nerves Vestibulocochlear (VIII)] : hearing was intact bilaterally [Cranial Nerves Accessory (XI - Cranial And Spinal)] : head turning and shoulder shrug symmetric [Cranial Nerves Hypoglossal (XII)] : there was no tongue deviation with protrusion [Motor Strength] : muscle strength was normal in all four extremities [Motor Handedness Right-Handed] : the patient is right hand dominant [Sensation Tactile Decrease] : light touch was intact [Romberg's Sign] : Romberg's sign was negtive [Abnormal Walk] : normal gait [Balance] : balance was intact [Dysdiadochokinesia Bilaterally] : not present [Coordination - Dysmetria Impaired Finger-to-Nose Bilateral] : not present

## 2024-02-29 NOTE — DISCUSSION/SUMMARY
[FreeTextEntry1] : 79-year-old woman history of depression, recent difficulty with memory, likely early dementia tolerating donepezil and memantine. Reviewed and discussed treatment. Patient is moving to [place] within the next several weeks, advised her to obtain a primary care provider in her area, she will look for a local neurologist. Reviewed and discussed other treatments, some of the newer agents, can contact local hospital, [facility]. Return to office, as needed.

## 2024-02-29 NOTE — REVIEW OF SYSTEMS
[As Noted in HPI] : as noted in HPI [Confused or Disoriented] : confusion [Memory Lapses or Loss] : memory loss [Changed Thought Patterns] : changed thought patterns [Repeating Questions] : repeated questioning about recent events [Negative] : Heme/Lymph

## 2024-02-29 NOTE — HISTORY OF PRESENT ILLNESS
[FreeTextEntry1] : 79-year-old woman with increasing forgetfulness episode of confusion, evidence of dementia on examination.  Started on donepezil now 10 mg at night and memantine 10 mg twice a day.  She feels better she feels her memory is improved, she also feels more energetic. Getting ready to move to [place] in the next 6 weeks or so. No new complaints.

## 2024-03-27 RX ORDER — OMEPRAZOLE 40 MG/1
40 CAPSULE, DELAYED RELEASE ORAL
Qty: 90 | Refills: 2 | Status: ACTIVE | COMMUNITY
Start: 2023-11-14 | End: 1900-01-01

## 2024-05-28 RX ORDER — DONEPEZIL HYDROCHLORIDE 5 MG/1
5 TABLET ORAL
Qty: 30 | Refills: 1 | Status: DISCONTINUED | COMMUNITY
Start: 2023-11-03 | End: 2024-05-28

## 2024-05-29 RX ORDER — DONEPEZIL HYDROCHLORIDE 23 MG/1
23 TABLET, FILM COATED ORAL
Qty: 90 | Refills: 0 | Status: ACTIVE | COMMUNITY
Start: 2023-12-27 | End: 1900-01-01

## 2024-06-03 NOTE — ED ADULT TRIAGE NOTE - AS HEIGHT TYPE
NPV-   History of Present Illness  37 y.o.female here for fuv 4th toe pain  1. Left foot pain  XR foot left 3+ views         Mechanism of injury: stubbed toe  Date of Injury/Pain: 4/12/24  Location of pain: 4th toe  Frequency of Pain: worse with walking  Associated symptoms? Swelling.  Previous treatment? UC, walk in, post op shoe    On 5/6/24; patient presents for fuv left foot 4th toe pain; patient reports she is doing well. WBAT in post op shoe. Alessandro taping. No pain.     On 6/3/24; patient reports she is doing well. No pain. Wearing athletic shoes with carbon fiber plate      27 point review of systems negative except what is stated in HPI    On examination of the left ankle/foot:  Normal gait  Normal alignment  Minimal swelling; No ecchymosis or erythema. Skin intact; no ulcers or lesions.   Normal ROM in  ankle plantarflexion, dorsiflexion, inversion and eversion; normal ROM in 2nd, 3rd, and 5th toe flexion/extension and 1st MTP flexion/extension; improved in 4th toe  Normal strength in ankle plantarflexion, dorsiflexion, inversion and eversion; normal strength with great toe flexion/extension  Tenderness to palpation: none over 4th toe  No tenderness to palpation over the Achilles, medial and lateral malleolus, posterior tibial tendon, peroneal tendon, ATFL, deltoid ligament, talus or navicular.  no tenderness to palpation over heel, plantar arch, base of 1st metatarsal/2nd metatarsal, sesamoids, 5th metatarsal, medial cuneiform, navicular, 1st MTP joint or 2nd or 3rd intermetarsal space.  Neurovascularly intact. Good capillary refill. No sensory deficit to light touch. Normal proprioception. Dorsalis pedis and posterior tibial pulses 2+ bilaterally.                    - negative vertical lachman's  - negative shuck testing              I personally reviewed the patient's x-ray images and reports of the left foot. The xrays show a minimally displaced fracture of the 4th proximal phalanx with interval callus  formation.  No other other fractures or dislocation.  Normal joint spacing    ASSESSMENT: left foot 4th toe fracture, healing    PLAN: I discussed with the patient the differential diagnosis, complex overuse and degenerative related nature of the condition(s) and available treatment option(s). She can continue athletic shoes with plate.   Patient was given a handout and instructed on an at home stretching program.  They should do these exercises 3 times per day for 6 weeks and then daily. Patient can use OTC Voltaren gel, biofreeze or  aspercream for pain and continue to ice and elevate supported at the calf to reduce swelling. Patient will increase their dietary calcium intake (milk,yogurt) and should get 1200 mg of calcium per day. They will also take a vitamin D supplement. All the patient's questions were answered. The patient agrees with the above plan.  Follow up in 4 weeks with repeat right foot xrays with AP, lateral and oblique views to evaluate bone healing.     stated

## 2024-07-26 ENCOUNTER — APPOINTMENT (OUTPATIENT)
Dept: NEUROLOGY | Facility: CLINIC | Age: 79
End: 2024-07-26
Payer: MEDICARE

## 2024-07-26 PROCEDURE — G2211 COMPLEX E/M VISIT ADD ON: CPT

## 2024-07-26 PROCEDURE — 99213 OFFICE O/P EST LOW 20 MIN: CPT

## 2024-07-26 NOTE — DATA REVIEWED
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  Note, given lateral parieto-occipital and temporal hypometabolism, possibility of mixed dementia with concomitant dementia with Lewy bodies, may be considered in the proper clinical setting.  Correlation with clinical exam and neuropsychological assessment is advised.  --- End of Report ---   ***Please see the addendum at the top of this report. It may contain additional important information or changes.****    CLINT PERSON MD; Attending Radiologist This document has been electronically signed. Oct 30 2023  8:37PM 1st Addendum: CLINT PERSON MD; Attending Radiologist The first addendum was electronically signed on: Nov 15 2023 11:33PM. [de-identified] : Discussion/Summary     Normal awake and drowsy EEG.    Signatures     Electronically signed by : MACY BRITTON MD; Sep  7 2023  9:16AM Eastern Standard Time (Author)

## 2024-07-26 NOTE — REVIEW OF SYSTEMS
[Memory Lapses or Loss] : memory loss [Decr. Concentrating Ability] : decreased concentrating ability [Changed Thought Patterns] : changed thought patterns [Repeating Questions] : repeated questioning about recent events [Facial Weakness] : no facial weakness [Arm Weakness] : no arm weakness [Hand Weakness] : no hand weakness [Leg Weakness] : no leg weakness [Numbness] : no numbness [Tingling] : no tingling [Migraine Headache] : no migraine headache [Difficulty Walking] : no difficulty walking [Inability to Walk] : able to walk [Frequent Falls] : not falling [Anxiety] : anxiety [Depression] : depression [Negative] : Heme/Lymph

## 2024-07-26 NOTE — HISTORY OF PRESENT ILLNESS
[FreeTextEntry1] : 79-year-old woman with diagnosis of dementia, mixed type, history of depression, anxiety disorder, for follow-up visit, with her son. Patient continues living at home, independent, she has family, her sons follow-up daily and frequently with their day-to-day needs.  She is able to take care of her household, paying some of her bills.  Not driving so much at this time.  Denies any recent falls or injuries, no new illness, no fever or chills. Her overall level of mood is pretty good, she does worse when she is isolated or her family is not around her.  But otherwise they report has been stable. She continues on Pristiq 100 mg daily for to good effect. She is also continues on tolerable tolerates donepezil 23 mg daily, she takes it in the morning, at night it gave her nightmares.  But so far doing fairly well. Also on memantine 10 mg twice a day.  No reported side effects.

## 2024-07-26 NOTE — REASON FOR VISIT
[Home] : at home, [unfilled] , at the time of the visit. [Medical Office: ([facility])___] : at the medical office located in

## 2024-07-26 NOTE — DISCUSSION/SUMMARY
[FreeTextEntry1] : 79-year-old woman with dementia, mild to moderate, underlying psych disorder. Reviewed and discussed treatment, will continue donepezil 20 mg p.o. daily. Continue memantine 10 mg twice a day. Encouraged regular physical activity, remain socially engaged. Encouraged to obtain a day program near her, so without when her immediate family is not available.  For period of time she has some backup perhaps a friend or group that she can interact with. She is now living in [place] out recommended that she obtain a practitioner in the local area she can continue her care. Will take care of some medications of as necessary until she finds that. Return to office 4 to 6 months.

## 2024-07-28 ENCOUNTER — NON-APPOINTMENT (OUTPATIENT)
Age: 79
End: 2024-07-28

## 2024-07-29 ENCOUNTER — NON-APPOINTMENT (OUTPATIENT)
Age: 79
End: 2024-07-29

## 2024-08-02 NOTE — ASU PATIENT PROFILE, ADULT - VISION (WITH CORRECTIVE LENSES IF THE PATIENT USUALLY WEARS THEM):
[de-identified] : Sinus Bradycardia  WITHIN NORMAL LIMITS
Normal vision: sees adequately in most situations; can see medication labels, newsprint

## 2024-08-26 ENCOUNTER — RX RENEWAL (OUTPATIENT)
Age: 79
End: 2024-08-26

## 2024-09-09 ENCOUNTER — NON-APPOINTMENT (OUTPATIENT)
Age: 79
End: 2024-09-09

## 2025-03-28 ENCOUNTER — RX RENEWAL (OUTPATIENT)
Age: 80
End: 2025-03-28

## 2025-05-02 NOTE — PROGRESS NOTE BEHAVIORAL HEALTH - NSBHTIMEBASEDBILLING_PSY_A_CORE
Called patient' daughter Katharine to clarify what the black toenail was about.  - Patient is having 1 black toenail skin surrounding is okay, they worried about diabetes requesting a blood test.  If it has been ordered however reassurance provided that this is most likely not related to diabetes.  Will reassess when I see patient next time.  - Patient has reached out to cardiology office regarding metoprolol and lisinopril refill however has not received response.  - They only are in need for metoprolol 100 mg and lisinopril 10 mg  the refills for which I will go ahead and place    - I have reordered the labs ordered in January as well as additional lab for a1c  
Patient's daughter came in to request labs for diabetes regarding a black toe nail she found.She also requesting medication refills on his metoprolol SR 50 MG and 100 MG, and lisinopril 10 MG.Please send to pharmacy at [address] [facility].  
no

## 2025-08-18 ENCOUNTER — RX RENEWAL (OUTPATIENT)
Age: 80
End: 2025-08-18